# Patient Record
Sex: FEMALE | Race: BLACK OR AFRICAN AMERICAN | Employment: OTHER | URBAN - METROPOLITAN AREA
[De-identification: names, ages, dates, MRNs, and addresses within clinical notes are randomized per-mention and may not be internally consistent; named-entity substitution may affect disease eponyms.]

---

## 2017-01-03 ENCOUNTER — HOSPITAL ENCOUNTER (OUTPATIENT)
Dept: LAB | Age: 62
Discharge: HOME | End: 2017-01-03

## 2017-01-03 DIAGNOSIS — R11.0 NAUSEA: ICD-10-CM

## 2017-01-03 PROCEDURE — 99001 SPECIMEN HANDLING PT-LAB: CPT | Performed by: INTERNAL MEDICINE

## 2017-01-03 RX ORDER — PROMETHAZINE HYDROCHLORIDE 12.5 MG/1
TABLET ORAL
Qty: 90 TAB | Refills: 0 | Status: SHIPPED | OUTPATIENT
Start: 2017-01-03 | End: 2017-01-21 | Stop reason: SDUPTHER

## 2017-01-04 ENCOUNTER — OFFICE VISIT (OUTPATIENT)
Dept: FAMILY MEDICINE CLINIC | Age: 62
End: 2017-01-04

## 2017-01-04 VITALS
WEIGHT: 130 LBS | TEMPERATURE: 98.3 F | SYSTOLIC BLOOD PRESSURE: 126 MMHG | HEART RATE: 100 BPM | DIASTOLIC BLOOD PRESSURE: 73 MMHG | BODY MASS INDEX: 21.66 KG/M2 | RESPIRATION RATE: 16 BRPM | HEIGHT: 65 IN | OXYGEN SATURATION: 97 %

## 2017-01-04 DIAGNOSIS — R11.2 NON-INTRACTABLE VOMITING WITH NAUSEA, UNSPECIFIED VOMITING TYPE: ICD-10-CM

## 2017-01-04 DIAGNOSIS — K22.4 ESOPHAGEAL DYSFUNCTION: ICD-10-CM

## 2017-01-04 DIAGNOSIS — G47.00 INSOMNIA, UNSPECIFIED TYPE: ICD-10-CM

## 2017-01-04 DIAGNOSIS — I10 ESSENTIAL HYPERTENSION: Primary | ICD-10-CM

## 2017-01-04 DIAGNOSIS — F41.9 ANXIETY: ICD-10-CM

## 2017-01-04 RX ORDER — SOTALOL HYDROCHLORIDE 80 MG/1
40 TABLET ORAL 2 TIMES DAILY
Qty: 60 TAB | Refills: 5 | Status: SHIPPED | OUTPATIENT
Start: 2017-01-04 | End: 2017-01-30 | Stop reason: ALTCHOICE

## 2017-01-04 RX ORDER — MIDODRINE HYDROCHLORIDE 2.5 MG/1
2.5 TABLET ORAL 3 TIMES DAILY
Qty: 90 TAB | Refills: 5 | Status: SHIPPED | OUTPATIENT
Start: 2017-01-04 | End: 2017-01-30 | Stop reason: ALTCHOICE

## 2017-01-04 RX ORDER — LORAZEPAM 0.5 MG/1
TABLET ORAL
Qty: 120 TAB | Refills: 0 | Status: SHIPPED | OUTPATIENT
Start: 2017-01-04 | End: 2017-01-25 | Stop reason: SDUPTHER

## 2017-01-04 NOTE — PROGRESS NOTES
Chief Complaint   Patient presents with    Follow-up    Insomnia     Pt states she needs a stronger dose     Medication Refill     HISTORY OF PRESENT ILLNESS  Anat Haro is a 64 y.o. female. HPI  Pt here for f/u. She is still stuttering. She also has been having vomiting and chest pain. Pt saw Dr Duran Saldana (GI) yesterday. She will finally go for the esophageal manometry soon. Dr Duran Saldana sent a new rx for an anti-emetic but she was not able to get it because of cost.  Pt was told by the pharmacy to check back today. Pt stopped her eliquis and notes that the blood in her stool resolved. She has to do another stool sample for him. pt was told yesterday that her a-fib was \"off. \"    Pt states that she slept better with the lorazepam.  She feels that a higher dose could be better for sleep. She does get to sleep well but does not sleep She was calmer with the lorazepam when she took it during the day. She has been taking it TID. Review of Systems   Constitutional: Positive for malaise/fatigue. HENT: Negative. Cardiovascular: Positive for chest pain. Gastrointestinal: Positive for nausea and vomiting. Psychiatric/Behavioral: The patient is nervous/anxious and has insomnia. All other systems reviewed and are negative. Physical Exam   Constitutional: She is oriented to person, place, and time. She appears well-developed and well-nourished. HENT:   Head: Normocephalic and atraumatic. Right Ear: External ear normal.   Left Ear: External ear normal.   Nose: Nose normal.   Eyes: Conjunctivae and EOM are normal.   Neck: Normal range of motion. Neck supple. No JVD present. No thyromegaly present. Cardiovascular: Normal rate and normal heart sounds. An irregularly irregular rhythm present. Exam reveals no gallop and no friction rub. No murmur heard. Pulmonary/Chest: Effort normal and breath sounds normal. She has no wheezes. She has no rhonchi. She has no rales. Musculoskeletal: Normal range of motion. Neurological: She is alert and oriented to person, place, and time. Coordination normal.   Stuttering intermittently   Skin: Skin is warm and dry. Psychiatric: She has a normal mood and affect. Her behavior is normal. Judgment and thought content normal.   Nursing note and vitals reviewed. ASSESSMENT and PLAN  Rob Ochoa was seen today for follow-up, insomnia and medication refill. Diagnoses and all orders for this visit:    Essential hypertension  -     midodrine (PROAMITINE) 2.5 mg tablet; Take 1 Tab by mouth three (3) times daily. -     sotalol (BETAPACE) 80 mg tablet; Take 0.5 Tabs by mouth two (2) times a day. [facility]  Has cards appt in Feb; appt moved to Jan 13. Pt advised to seek emergent care for chest pain or other concerning chest sx. Anxiety  -     LORazepam (ATIVAN) 0.5 mg tablet; Take 1 pill by mouth at 7am if needed, take one pill by mouth at 3 pm if needed. Take 2 pills by mouth at 11pm each night. Insomnia, unspecified type  -     LORazepam (ATIVAN) 0.5 mg tablet; Take 1 pill by mouth at 7am if needed, take one pill by mouth at 3 pm if needed. Take 2 pills by mouth at 11pm each night.     Non-intractable vomiting with nausea, unspecified vomiting type  Gi eval in progress    Esophageal dysfunction  Gi eval in progress    Follow-up Disposition: 1 month; sooner prn

## 2017-01-04 NOTE — PROGRESS NOTES
Sarah Darby 64 y.o. female   Chief Complaint   Patient presents with    Follow-up    Insomnia     Pt states she needs a stronger dose     Medication Refill         1. Have you been to the ER, urgent care clinic since your last visit? Hospitalized since your last visit? No    2. Have you seen or consulted any other health care providers outside of the [address] [place] [address] since your last visit? Include any pap smears or colon screening.  No

## 2017-01-13 ENCOUNTER — OFFICE VISIT (OUTPATIENT)
Dept: CARDIOLOGY CLINIC | Age: 62
End: 2017-01-13

## 2017-01-13 VITALS
OXYGEN SATURATION: 93 % | HEART RATE: 72 BPM | SYSTOLIC BLOOD PRESSURE: 140 MMHG | WEIGHT: 127 LBS | HEIGHT: 65 IN | DIASTOLIC BLOOD PRESSURE: 90 MMHG | BODY MASS INDEX: 21.16 KG/M2

## 2017-01-13 DIAGNOSIS — K21.00 GASTROESOPHAGEAL REFLUX DISEASE WITH ESOPHAGITIS: ICD-10-CM

## 2017-01-13 DIAGNOSIS — F41.9 ANXIETY: ICD-10-CM

## 2017-01-13 DIAGNOSIS — I47.1 SUPRAVENTRICULAR TACHYCARDIA (HCC): Primary | ICD-10-CM

## 2017-01-13 DIAGNOSIS — R07.89 OTHER CHEST PAIN: ICD-10-CM

## 2017-01-13 NOTE — MR AVS SNAPSHOT
Visit Information Date & Time Provider Department Dept. Phone Encounter #  
 1/13/2017 12:40 PM Stephenie Piña MD [facility] [address] [id number] Your Appointments 2/6/2017 10:00 AM  
Follow Up with Lesli Salmeron MD  
[facility] (--) Appt Note: 1 month follow up Shonda Stephen [place] [address] [phone]  
  
    
 2/14/2017  2:20 PM  
Follow Up with Ej Veliz DO [facility] ([address]) Appt Note: ref by Dr. Arin Bradford ofc dx afib   notes in cc per Florliz Johnson Lora Zarate [address]  
[phone] Leodan Chang  
  
    
 2/16/2017 10:45 AM  
Office Visit with Levar Khan MD  
[facility] [address] [place] [address]) Appt Note: 2 mth f/u  
 [address] [facility] [address]  
[phone] [address]  
  
    
 3/22/2017 10:20 AM  
Follow Up with Luann Boone MD  
[facility] [address]) Appt Note: 3mon f/u  
 [address] [facility] [address] [facility] [address]  
[phone]  
  
   
 [place] [address] Upcoming Health Maintenance Date Due Hepatitis C Screening 1955 DTaP/Tdap/Td series (1 - Tdap) 1/29/1976 ZOSTER VACCINE AGE 60> 1/29/2015 INFLUENZA AGE 9 TO ADULT 8/1/2016 COLONOSCOPY 7/21/2017 PAP AKA CERVICAL CYTOLOGY 9/30/2017 BREAST CANCER SCRN MAMMOGRAM 11/2/2017 Allergies as of 1/13/2017  Review Complete On: 1/4/2017 By: Lesli Salmeron MD  
  
 Severity Noted Reaction Type Reactions Codeine  12/23/2013   Intolerance Nausea and Vomiting Pcn [Penicillins]    Not Reported This Time, Hives Tramadol  12/23/2013    Palpitations Vicodin [Hydrocodone-acetaminophen]  12/23/2013    Itching, Hives Current Immunizations  Reviewed on 6/25/2014 No immunizations on file. Not reviewed this visit You Were Diagnosed With   
  
 Codes Comments Atrial fibrillation, unspecified type ([facility]    -  Primary ICD-10-CM: I48.91 
ICD-9-CM: 427.31 Vitals BP Pulse Height(growth percentile) Weight(growth percentile) LMP SpO2  
 140/90 72 5' 5\" (1.651 m) 127 lb (57.6 kg) 12/30/1988 93% BMI OB Status Smoking Status 21.13 kg/m2 Hysterectomy Former Smoker Vitals History BMI and BSA Data Body Mass Index Body Surface Area  
 21.13 kg/m 2 1.63 m 2 Preferred Pharmacy Pharmacy Name Phone [facility], [address] Your Updated Medication List  
  
   
This list is accurate as of: 1/13/17  2:08 PM.  Always use your most recent med list.  
  
  
  
  
 apixaban 5 mg tablet Commonly known as:  Lauryn Falling Take 5 mg by mouth two (2) times a day. levothyroxine 75 mcg tablet Commonly known as:  SYNTHROID Take 1 Tab by mouth Daily (before breakfast). LORazepam 0.5 mg tablet Commonly known as:  ATIVAN Take 1 pill by mouth at 7am if needed, take one pill by mouth at 3 pm if needed. Take 2 pills by mouth at 11pm each night.  
  
 midodrine 2.5 mg tablet Commonly known as:  Maddie Vijay Take 1 Tab by mouth three (3) times daily. MULTIVITAMIN PO Take 1 tablet by mouth daily as needed. omeprazole 40 mg capsule Commonly known as:  PRILOSEC Take 1 Cap by mouth two (2) times a day. * oxyCODONE-acetaminophen  mg per tablet Commonly known as:  PERCOCET 10 Take 0.5-1 Tabs by mouth every six (6) hours as needed for Pain for up to 30 days. Max Daily Amount: 4 Tabs. * oxyCODONE-acetaminophen  mg per tablet Commonly known as:  PERCOCET 10  
 Take 0.5-1 Tabs by mouth every six (6) hours as needed for Pain for up to 30 days. Max Daily Amount: 4 Tabs. Start taking on:  1/20/2017  
  
 promethazine 12.5 mg tablet Commonly known as:  PHENERGAN  
take 1 tablet by mouth every 8 hours if needed for nausea  
  
 sotalol 80 mg tablet Commonly known as:  Lady Ortez Take 0.5 Tabs by mouth two (2) times a day. sucralfate 100 mg/mL suspension Commonly known as:  Charan Noe Take 1,000 mg by mouth Before breakfast, lunch, dinner and at bedtime. traZODone 50 mg tablet Commonly known as:  Jeremiah Lipps Take 1 Tab by mouth nightly as needed for Sleep. VITAMIN B-12 PO Take 1 tablet by mouth as needed. * Notice: This list has 2 medication(s) that are the same as other medications prescribed for you. Read the directions carefully, and ask your doctor or other care provider to review them with you. We Performed the Following AMB POC EKG ROUTINE W/ 12 LEADS, INTER & REP [93221 CPT(R)] Patient Instructions Decrease sotalol to 40 mg one tablet twice a day (1/2 tab of your 80 mg of sotalol twice a day) Stop eliquis Start Apirin 81 mg daily Introducing [facility]! [facility] introduces Punch! patient portal. Now you can access parts of your medical record, email your doctor's office, and request medication refills online. 1. In your internet browser, go to [email or web address] 2. Click on the First Time User? Click Here link in the Sign In box. You will see the New Member Sign Up page. 3. Enter your Punch! Access Code exactly as it appears below. You will not need to use this code after youve completed the sign-up process. If you do not sign up before the expiration date, you must request a new code. · Punch! Access Code: [email or web address] Expires: 2/26/2017  7:43 AM 
 
4.  Enter the last four digits of your Social Security Number (xxxx) and Date of Birth (mm/dd/yyyy) as indicated and click Submit. You will be taken to the next sign-up page. 5. Create a LeadCloud ID. This will be your LeadCloud login ID and cannot be changed, so think of one that is secure and easy to remember. 6. Create a LeadCloud password. You can change your password at any time. 7. Enter your Password Reset Question and Answer. This can be used at a later time if you forget your password. 8. Enter your e-mail address. You will receive e-mail notification when new information is available in [address] 9. Click Sign Up. You can now view and download portions of your medical record. 10. Click the Download Summary menu link to download a portable copy of your medical information. If you have questions, please visit the Frequently Asked Questions section of the LeadCloud website. Remember, LeadCloud is NOT to be used for urgent needs. For medical emergencies, dial 911. Now available from your iPhone and Android! Please provide this summary of care documentation to your next provider. Your primary care clinician is listed as Ericka Gonzalez. If you have any questions after today's visit, please call [phone].

## 2017-01-13 NOTE — PATIENT INSTRUCTIONS
Decrease sotalol to 40 mg one tablet twice a day (1/2 tab of your 80 mg of sotalol twice a day)  Stop eliquis   Start Apirin 81 mg daily

## 2017-01-21 DIAGNOSIS — R11.0 NAUSEA: ICD-10-CM

## 2017-01-23 RX ORDER — PROMETHAZINE HYDROCHLORIDE 12.5 MG/1
TABLET ORAL
Qty: 90 TAB | Refills: 0 | Status: SHIPPED | OUTPATIENT
Start: 2017-01-23 | End: 2017-02-20 | Stop reason: DRUGHIGH

## 2017-01-25 DIAGNOSIS — F41.9 ANXIETY: ICD-10-CM

## 2017-01-25 DIAGNOSIS — G47.00 INSOMNIA, UNSPECIFIED TYPE: ICD-10-CM

## 2017-01-25 RX ORDER — LORAZEPAM 0.5 MG/1
TABLET ORAL
Qty: 120 TAB | Refills: 0 | Status: SHIPPED | OUTPATIENT
Start: 2017-01-25 | End: 2017-02-20 | Stop reason: SDUPTHER

## 2017-01-26 ENCOUNTER — OFFICE VISIT (OUTPATIENT)
Dept: CARDIOLOGY CLINIC | Age: 62
End: 2017-01-26

## 2017-01-26 VITALS
DIASTOLIC BLOOD PRESSURE: 80 MMHG | WEIGHT: 131 LBS | HEART RATE: 89 BPM | HEIGHT: 65 IN | SYSTOLIC BLOOD PRESSURE: 130 MMHG | BODY MASS INDEX: 21.83 KG/M2

## 2017-01-26 DIAGNOSIS — K21.00 GASTROESOPHAGEAL REFLUX DISEASE WITH ESOPHAGITIS: ICD-10-CM

## 2017-01-26 DIAGNOSIS — R53.1 WEAKNESS GENERALIZED: ICD-10-CM

## 2017-01-26 DIAGNOSIS — I47.1 SUPRAVENTRICULAR TACHYCARDIA (HCC): Primary | ICD-10-CM

## 2017-01-26 DIAGNOSIS — I10 ESSENTIAL HYPERTENSION: ICD-10-CM

## 2017-01-26 DIAGNOSIS — F41.9 ANXIETY: ICD-10-CM

## 2017-01-26 RX ORDER — TIZANIDINE 4 MG/1
TABLET ORAL
Refills: 0 | COMMUNITY
Start: 2017-01-17 | End: 2017-02-17 | Stop reason: SDUPTHER

## 2017-01-26 RX ORDER — ASPIRIN 81 MG/1
TABLET ORAL DAILY
COMMUNITY
End: 2020-02-03 | Stop reason: ALTCHOICE

## 2017-01-26 RX ORDER — HYDROCHLOROTHIAZIDE 25 MG/1
25 TABLET ORAL DAILY
Refills: 0 | COMMUNITY
Start: 2017-01-10 | End: 2018-02-13 | Stop reason: SDUPTHER

## 2017-01-26 NOTE — MR AVS SNAPSHOT
Visit Information Date & Time Provider Department Dept. Phone Encounter #  
 1/26/2017 11:00 AM Crystal Diez MD [facility] [address] [id number] Your Appointments 2/6/2017 10:00 AM  
Follow Up with Maico Ly MD  
[address] [place] [address] (--) Appt Note: 1 month follow up Shonda Stephen [address] [address]  
  
    
 2/14/2017  2:20 PM  
Follow Up with Eliz Ross DO [facility]) Appt Note: ref by Dr. Alan Grimaldo ofc dx afib   notes in cc per Toya Johnson [address] [address] [address] [phone] Leodan Chang  
  
    
 2/16/2017 10:45 AM  
Office Visit with Janice Metz MD  
[address] [place] [address] for Pain Management [facility] Appt Note: 2 mth f/u  
 [address] [facility] [address]  
[phone] [address]  
  
    
 3/22/2017 10:20 AM  
Follow Up with Fred Cedillo MD  
[address] [place] [address] Appt Note: 3mon f/u  
 [address] [facility] [address] [facility] [address]  
[phone]  
  
   
 [facility] [address] Upcoming Health Maintenance Date Due Hepatitis C Screening 1955 DTaP/Tdap/Td series (1 - Tdap) 1/29/1976 ZOSTER VACCINE AGE 60> 1/29/2015 INFLUENZA AGE 9 TO ADULT 8/1/2016 COLONOSCOPY 7/21/2017 PAP AKA CERVICAL CYTOLOGY 9/30/2017 BREAST CANCER SCRN MAMMOGRAM 11/2/2017 Allergies as of 1/26/2017  Review Complete On: 1/26/2017 By: Zackary Cheng Severity Noted Reaction Type Reactions Codeine  12/23/2013   Intolerance Nausea and Vomiting Pcn [Penicillins]    Not Reported This Time, Hives Tramadol  12/23/2013    Palpitations Vicodin [Hydrocodone-acetaminophen]  12/23/2013    Itching, Hives Current Immunizations  Reviewed on 6/25/2014 No immunizations on file. Not reviewed this visit You Were Diagnosed With   
  
 Codes Comments Paroxysmal atrial fibrillation (HCC)    -  Primary ICD-10-CM: I48.0 ICD-9-CM: 427.31 Vitals BP Pulse Height(growth percentile) Weight(growth percentile) LMP BMI  
 130/80 89 5' 5\" (1.651 m) 131 lb (59.4 kg) 12/30/1988 21.8 kg/m2 OB Status Smoking Status Hysterectomy Former Smoker Vitals History BMI and BSA Data Body Mass Index Body Surface Area  
 21.8 kg/m 2 1.65 m 2 Preferred Pharmacy Pharmacy Name Phone [address] [facility], [address] Your Updated Medication List  
  
   
This list is accurate as of: 1/26/17 12:26 PM.  Always use your most recent med list.  
  
  
  
  
 aspirin delayed-release 81 mg tablet Take  by mouth daily. hydroCHLOROthiazide 25 mg tablet Commonly known as:  HYDRODIURIL Take 25 mg by mouth daily. levothyroxine 75 mcg tablet Commonly known as:  SYNTHROID Take 1 Tab by mouth Daily (before breakfast). LORazepam 0.5 mg tablet Commonly known as:  ATIVAN  
TAKE 1 TABLET BY MOUTH AT 7AM AS NEEDED. TAKE 1 TABLET AT 3PM AS NEEDED. TAKE 2 TABLETS AT 11PM EVERY NIGHT  
  
 midodrine 2.5 mg tablet Commonly known as:  Martha Pickle Take 1 Tab by mouth three (3) times daily. omeprazole 40 mg capsule Commonly known as:  PRILOSEC Take 1 Cap by mouth two (2) times a day. oxyCODONE-acetaminophen  mg per tablet Commonly known as:  PERCOCET 10 Take 0.5-1 Tabs by mouth every six (6) hours as needed for Pain for up to 30 days. Max Daily Amount: 4 Tabs. promethazine 12.5 mg tablet Commonly known as:  PHENERGAN  
take 1 tablet by mouth every 8 hours if needed for nausea  
  
 sotalol 80 mg tablet Commonly known as:  Edmond Fuentesring Take 0.5 Tabs by mouth two (2) times a day. tiZANidine 4 mg tablet Commonly known as:  ZANAFLEX  
  
 traZODone 50 mg tablet Commonly known as:  Barr Dwight Take 1 Tab by mouth nightly as needed for Sleep. VITAMIN B-12 PO Take 1 tablet by mouth as needed. We Performed the Following AMB POC EKG ROUTINE W/ 12 LEADS, INTER & REP [69597 CPT(R)] Introducing [facility]! Matthew Brigido introduces Spotigo patient portal. Now you can access parts of your medical record, email your doctor's office, and request medication refills online. 1. In your internet browser, go to [email or web address] 2. Click on the First Time User? Click Here link in the Sign In box. You will see the New Member Sign Up page. 3. Enter your Spotigo Access Code exactly as it appears below. You will not need to use this code after youve completed the sign-up process. If you do not sign up before the expiration date, you must request a new code. · Spotigo Access Code: [email or web address] Expires: 2/26/2017  7:43 AM 
 
4. Enter the last four digits of your Social Security Number (xxxx) and Date of Birth (mm/dd/yyyy) as indicated and click Submit. You will be taken to the next sign-up page. 5. Create a Spotigo ID. This will be your Spotigo login ID and cannot be changed, so think of one that is secure and easy to remember. 6. Create a Spotigo password. You can change your password at any time. 7. Enter your Password Reset Question and Answer. This can be used at a later time if you forget your password. 8. Enter your e-mail address. You will receive e-mail notification when new information is available in [address] 9. Click Sign Up. You can now view and download portions of your medical record. 10. Click the Download Summary menu link to download a portable copy of your medical information. If you have questions, please visit the Frequently Asked Questions section of the PacketFront[employer] website. Remember, [employer] is NOT to be used for urgent needs. For medical emergencies, dial 911. Now available from your iPhone and Android! Please provide this summary of care documentation to your next provider. Your primary care clinician is listed as Frances Rojas. If you have any questions after today's visit, please call [phone].

## 2017-01-26 NOTE — PROGRESS NOTES
1. Have you been to the ER, urgent care clinic since your last visit? Hospitalized since your last visit? NO  2. Have you seen or consulted any other health care providers outside of the [address] [place] [address] since your last visit? Include any pap smears or colon screening.   NO

## 2017-01-30 PROBLEM — F41.9 ANXIETY: Status: ACTIVE | Noted: 2017-01-30

## 2017-01-30 PROBLEM — K21.00 GASTROESOPHAGEAL REFLUX DISEASE WITH ESOPHAGITIS: Status: ACTIVE | Noted: 2017-01-30

## 2017-01-30 PROBLEM — I47.1 SUPRAVENTRICULAR TACHYCARDIA (HCC): Status: ACTIVE | Noted: 2017-01-30

## 2017-01-30 PROBLEM — R53.1 WEAKNESS GENERALIZED: Status: ACTIVE | Noted: 2017-01-30

## 2017-01-30 NOTE — PROGRESS NOTES
PATIENT NAME: Jamar Palomo         58 y.o.      1955              DATE:1/26/2017    REASON FOR VISIT: Supraventricular tachycardia    HISTORY OF PRESENT ILLNESS: This patient was previously diagnosed with atrial fibrillation. The rhythm was actually sinus tachycardia with premature atrial complexes and short paroxysms of supraventricular tachycardia. Her sotalol was decreased to 40 mg twice daily. Eliquis has been discontinued because of gastrointestinal bleeding. She denies palpitation. She does complain of periods of weakness. Denies syncope and presyncope. History of chest pain. Ischemic workup negative. Chest discomfort continues to be associated with sour eructation and a cough productive of sour tasting sputum. The discomfort is not exertional and is not accompanied by dyspnea or diaphoresis. The patient developed stuttering that was felt to be psychogenic.   This actually seems to be improving somewhat although it does persist.    PAST MEDICAL HISTORY:   Past Medical History:    Anxiety disorder                                              Arthritis                                                     Cervical dystonia                               9/5/2014      Cervical pain                                                 Cervicogenic headache                           9/5/2014      Chronic pain                                                    Comment:knee    DDD (degenerative disc disease), cervical       9/5/2014      Essential hypertension                                        Fibrillation, atrial (HCC)                                    Fibromyalgia                                                  GERD (gastroesophageal reflux disease)                        Headache(784.0)                                               Hepatic steatosis                                             Herniated disc, cervical                                      Hypercholesterolemia Hypertension                                                  Joint pain                                                    JRA (juvenile rheumatoid arthritis) ([facility])                     Muscle pain                                                   Musculoskeletal pain                            9/5/2014      Numbness and tingling of left arm and leg                     Status post cervical spinal fusion              9/5/2014      Thyroid disease                                               Vision decreased                                              PAST SURGICAL HISTORY:   Past Surgical History:    HX GASTRIC BYPASS                                02/03/10      HX OTHER SURGICAL                                                Comment:GIST tumor resected    HX CERVICAL LAMINECTOMY                          5/21/13         Comment:C3-4 ACDF     HX ORTHOPAEDIC                                   2013            Comment:Spinal SurgeryC-2 AND C-3    HX PARTIAL HYSTERECTOMY                          1988          HX LUMBAR LAMINECTOMY                                          HX OTHER SURGICAL                                                Comment:teeth extractions    MA EXCISION TUMOR SOFT TISS FACE/SCALP SUBQ < * N/A 09/30/2016      Comment:Dr. Fracisco Colin      SOCIAL HISTORY:  Social History    Marital status:              Spouse name:                       Years of education:                 Number of children:               Occupational History  Occupation          Employer            Comment               disabled            disability              Social History Main Topics    Smoking status: Former Smoker                                                                Packs/day: 0.00      Years: 0.00           Types: Cigarettes       Quit date: 12/31/1989    Smokeless status: Never Used                        Alcohol use: No              Drug use:  Yes                Special: Prescription, OTC      ALLERGIES:    -- Codeine -- Nausea and Vomiting   -- Pcn [Penicillins] -- Not Reported This Time and Hives   -- Tramadol -- Palpitations   -- Vicodin [Hydrocodone-Acetaminophen] -- Itching and Hives     CURRENT MEDICATIONS:   Current Outpatient Prescriptions:  hydroCHLOROthiazide (HYDRODIURIL) 25 mg tablet, Take 25 mg by mouth daily. tiZANidine (ZANAFLEX) 4 mg tablet,   aspirin delayed-release 81 mg tablet, Take  by mouth daily. LORazepam (ATIVAN) 0.5 mg tablet, TAKE 1 TABLET BY MOUTH AT 7AM AS NEEDED. TAKE 1 TABLET AT 3PM AS NEEDED. TAKE 2 TABLETS AT 11PM EVERY NIGHT  promethazine (PHENERGAN) 12.5 mg tablet, take 1 tablet by mouth every 8 hours if needed for nausea  midodrine (PROAMITINE) 2.5 mg tablet, Take 1 Tab by mouth three (3) times daily. sotalol (BETAPACE) 80 mg tablet, Take 0.5 Tabs by mouth two (2) times a day. oxyCODONE-acetaminophen (PERCOCET 10)  mg per tablet, Take 0.5-1 Tabs by mouth every six (6) hours as needed for Pain for up to 30 days. Max Daily Amount: 4 Tabs. traZODone (DESYREL) 50 mg tablet, Take 1 Tab by mouth nightly as needed for Sleep.  omeprazole (PRILOSEC) 40 mg capsule, Take 1 Cap by mouth two (2) times a day. levothyroxine (SYNTHROID) 75 mcg tablet, Take 1 Tab by mouth Daily (before breakfast). CYANOCOBALAMIN, VITAMIN B-12, (VITAMIN B-12 PO), Take 1 tablet by mouth as needed. No current facility-administered medications for this visit. REVIEW of SYSTEMS:History obtained from spouse, chart review and the patient  General ROS: negative for - weight gain or weight loss  Psychological ROS: positive for - anxiety  Respiratory ROS: See history of present illness  Cardiovascular ROS: See history of present illness  Gastrointestinal ROS: Continues to experience rather severe gastroesophageal reflux. There have been no signs of gastrointestinal bleeding.   Neurological ROS: no TIA or stroke symptoms     PHYSICAL EXAMINATION:   /80  Pulse 89 Ht 5' 5\" (1.651 m)  Wt 59.4 kg (131 lb)  LMP 12/30/1988  BMI 21.8 kg/m2  BP Readings from Last 3 Encounters:  01/26/17 : 130/80  01/13/17 : 140/90  01/04/17 : 126/73    Pulse Readings from Last 3 Encounters:  01/26/17 : 89  01/13/17 : 72  01/04/17 : 100    Wt Readings from Last 3 Encounters:  01/26/17 : 59.4 kg (131 lb)  01/13/17 : 57.6 kg (127 lb)  01/04/17 : 59 kg (130 lb)    General: Well-developed -American female no apparent distress. Neck: No jugular venous distention. Carotid upstrokes 2+ without bruits. Chest: Scattered rhonchi. No rales or wheezes. Heart: PMI not palpable. Regular rhythm. No murmur or gallop audible. Abdomen: Epigastric discomfort to palpation. No peritoneal signs. Extremities: No edema. Skin: Warm and dry. No stasis changes. Neurologic: The patient is stuttering although this has improved. Speech content is normal.  No facial asymmetry. EKG: Sinus rhythm. Atrial premature complexes, conducted and nonconducted. Nonspecific T changes    IMPRESSION:   Paroxysmal supraventricular tachycardia, asymptomatic  No documented history of atrial fibrillation  Chest pain, noncardiac. Gastroesophageal reflux. Generalized weakness  Anxiety    PLAN:  Discontinue sotalol  Discontinue midodrine  Return to office in 2 weeks    The diagnoses and plan were discussed with patient and spouse. All questions answered. Plan of care agreed to by all concerned. William Klein.  MD Flynn       ,

## 2017-01-30 NOTE — PROGRESS NOTES
PATIENT NAME: Wilmar Bejarano         58 y.o.      1955              DATE:1/13/2017    REASON FOR VISIT: Atrial fibrillation    HISTORY OF PRESENT ILLNESS: This patient was admitted to another hospital in November of this year after presenting with chest pain. She was diagnosed with atrial fibrillation on admission. I have reviewed that EKG. The rhythm was actually sinus tachycardia with atrial premature complexes and short runs of supraventricular tachycardia. She was discharged on sotalol 80 mg twice daily and Eliquis. The Eliquis has since been discontinued because of bleeding problems. The patient denies palpitation however she is extremely weak and experiences lightheaded episodes frequently. The chest pain in question is a high sternal heaviness/aching discomfort that is worse after eating and that is associated with sour eructation. It is also associated with a cough productive of sour tasting sputum. The patient had a pharmacologic stress test while in the hospital.  The myocardial scan was read as being entirely normal.  She also had an echocardiogram that showed normal left ventricular size and wall motion and normal ejection fraction. She is being evaluated by a gastroenterologist for the chest discomfort and the gastrointestinal bleeding. The patient is status post gastric bypass surgery. Upper endoscopy while in the hospital did not reveal any evidence for obstruction. Patient has developed rather severe stuttering that is felt to be psychogenic. She is extremely anxious.     PAST MEDICAL HISTORY:   Past Medical History:    Anxiety disorder                                              Arthritis                                                     Cervical dystonia                               9/5/2014      Cervical pain                                                 Cervicogenic headache                           9/5/2014      Chronic pain Comment:knee    DDD (degenerative disc disease), cervical       9/5/2014      Essential hypertension                                        Fibrillation, atrial (HCC)                                    Fibromyalgia                                                  GERD (gastroesophageal reflux disease)                        Headache(784.0)                                               Hepatic steatosis                                             Herniated disc, cervical                                      Hypercholesterolemia                                          Hypertension                                                  Joint pain                                                    JRA (juvenile rheumatoid arthritis) (HCC)                     Muscle pain                                                   Musculoskeletal pain                            9/5/2014      Numbness and tingling of left arm and leg                     Status post cervical spinal fusion              9/5/2014      Thyroid disease                                               Vision decreased                                              PAST SURGICAL HISTORY:   Past Surgical History:    HX GASTRIC BYPASS                                02/03/10      HX OTHER SURGICAL                                                Comment:GIST tumor resected    HX CERVICAL LAMINECTOMY                          5/21/13         Comment:C3-4 ACDF     HX ORTHOPAEDIC                                   2013            Comment:Spinal SurgeryC-2 AND C-3    HX PARTIAL HYSTERECTOMY                          1988          HX LUMBAR LAMINECTOMY                                          HX OTHER SURGICAL                                                Comment:teeth extractions    AK EXCISION TUMOR SOFT TISS FACE/SCALP SUBQ < * N/A 09/30/2016      Comment:Dr. Tracie Don      SOCIAL HISTORY:  Social History    Marital status:              Spouse name: Years of education:                 Number of children:               Occupational History  Occupation          Employer            Comment               disabled            disability              Social History Main Topics    Smoking status: Former Smoker                                                                Packs/day: 0.00      Years: 0.00           Types: Cigarettes       Quit date: 12/31/1989    Smokeless status: Never Used                        Alcohol use: No              Drug use: Yes                Special: Prescription, OTC      ALLERGIES:    -- Codeine -- Nausea and Vomiting   -- Pcn [Penicillins] -- Not Reported This Time and Hives   -- Tramadol -- Palpitations   -- Vicodin [Hydrocodone-Acetaminophen] -- Itching and Hives     CURRENT MEDICATIONS:   Current Outpatient Prescriptions:  midodrine (PROAMITINE) 2.5 mg tablet, Take 1 Tab by mouth three (3) times daily. sotalol (BETAPACE) 80 mg tablet, Take 0.5 Tabs by mouth two (2) times a day. oxyCODONE-acetaminophen (PERCOCET 10)  mg per tablet, Take 0.5-1 Tabs by mouth every six (6) hours as needed for Pain for up to 30 days. Max Daily Amount: 4 Tabs. traZODone (DESYREL) 50 mg tablet, Take 1 Tab by mouth nightly as needed for Sleep.  omeprazole (PRILOSEC) 40 mg capsule, Take 1 Cap by mouth two (2) times a day. levothyroxine (SYNTHROID) 75 mcg tablet, Take 1 Tab by mouth Daily (before breakfast). CYANOCOBALAMIN, VITAMIN B-12, (VITAMIN B-12 PO), Take 1 tablet by mouth as needed. hydroCHLOROthiazide (HYDRODIURIL) 25 mg tablet, Take 25 mg by mouth daily. tiZANidine (ZANAFLEX) 4 mg tablet,   aspirin delayed-release 81 mg tablet, Take  by mouth daily. LORazepam (ATIVAN) 0.5 mg tablet, TAKE 1 TABLET BY MOUTH AT 7AM AS NEEDED. TAKE 1 TABLET AT 3PM AS NEEDED.  TAKE 2 TABLETS AT 11PM EVERY NIGHT  promethazine (PHENERGAN) 12.5 mg tablet, take 1 tablet by mouth every 8 hours if needed for nausea    No current facility-administered medications for this visit. REVIEW of SYSTEMS:History obtained from spouse, chart review and the patient  General ROS: negative for - fever, weight gain or weight loss  Psychological ROS: positive for - anxiety and psychogenic stuttering  Respiratory ROS: Postprandial cough productive of sour tasting sputum. Cardiovascular ROS: Please see history of present illness. Denies exertional chest discomfort. She claims to be extremely lightheaded. Denies palpitation. She has not been syncopal.  She does apparently experience some shortness of breath on exertion. Denies orthopnea and paroxysmal nocturnal dyspnea. Gastrointestinal ROS: Gastroesophageal reflux per history of present illness. History of gastrointestinal bleeding being evaluated by gastroenterology. Neurological ROS: no TIA or stroke symptoms     PHYSICAL EXAMINATION:   /90  Pulse 72  Ht 5' 5\" (1.651 m)  Wt 57.6 kg (127 lb)  LMP 12/30/1988  SpO2 93%  BMI 21.13 kg/m2  BP Readings from Last 3 Encounters:  01/26/17 : 130/80  01/13/17 : 140/90  01/04/17 : 126/73    Pulse Readings from Last 3 Encounters:  01/26/17 : 89  01/13/17 : 72  01/04/17 : 100    Wt Readings from Last 3 Encounters:  01/26/17 : 59.4 kg (131 lb)  01/13/17 : 57.6 kg (127 lb)  01/04/17 : 59 kg (130 lb)    General: Very anxious appearing -American female . HEENT: Sclera clear. Mucous membranes pink and moist.  Neck: No jugular venous distention. Carotid upstrokes 2+ without bruits. Chest: Scattered rhonchi. No wheezes. Heart: PMI not palpable. Regular rhythm. No murmur or gallop. Abdomen: Nontender without masses or organomegaly. Extremities: No edema. Dorsalis pedis and posterior tibial pulses intact skin: Warm and dry. No stasis changes. Neuro: Alert, oriented, speech content is normal but the patient is stuttering severely. , no facial asymmetry. Gait WNL. EKG: Sinus rhythm.   Nonspecific T changes    IMPRESSION:   The history of atrial fibrillation was not documented. The EKG actually showed sinus tachycardia with premature atrial complexes and paroxysms of supraventricular tachycardia. Chest pain, noncardiac. Gastroesophageal reflux  Probable component of pulmonary aspiration  History of gastrointestinal bleeding    PLAN:  Stay off of Eliquis. The patient does not require anticoagulation  Decrease sotalol to 40 mg twice daily  Follow-up in 1-2 weeks. Consider discontinuing sotalol at that time    The diagnoses and plan were discussed with patient and spouse. All questions answered. Plan of care agreed to by all concerned. Oli Kemp.  MD Flynn       ,              .

## 2017-02-07 ENCOUNTER — HOSPITAL ENCOUNTER (OUTPATIENT)
Dept: GENERAL RADIOLOGY | Age: 62
Discharge: HOME | End: 2017-02-07
Attending: INTERNAL MEDICINE
Payer: MEDICARE

## 2017-02-07 DIAGNOSIS — R13.10 DYSPHAGIA, UNSPECIFIED TYPE: ICD-10-CM

## 2017-02-07 DIAGNOSIS — R13.10 DYSPHAGIA: ICD-10-CM

## 2017-02-07 PROCEDURE — G8996 SWALLOW CURRENT STATUS: HCPCS

## 2017-02-07 PROCEDURE — G8997 SWALLOW GOAL STATUS: HCPCS

## 2017-02-07 PROCEDURE — G8998 SWALLOW D/C STATUS: HCPCS

## 2017-02-07 PROCEDURE — 74230 X-RAY XM SWLNG FUNCJ C+: CPT

## 2017-02-07 PROCEDURE — 74011000255 HC RX REV CODE- 255: Performed by: INTERNAL MEDICINE

## 2017-02-07 PROCEDURE — 92611 MOTION FLUOROSCOPY/SWALLOW: CPT

## 2017-02-07 RX ADMIN — BARIUM SULFATE 45 ML: 400 PASTE ORAL at 14:00

## 2017-02-07 RX ADMIN — BARIUM SULFATE 700 MG: 700 TABLET ORAL at 14:00

## 2017-02-07 RX ADMIN — BARIUM SULFATE 60 G: 960 POWDER, FOR SUSPENSION ORAL at 14:00

## 2017-02-08 NOTE — PROGRESS NOTES
Outpatient Modified Barium Swallow Evaluation    Patient: Jamar Palomo (22 y.o. female)  Date: 2/7/2017  Primary Diagnosis: Dysphagia [R13.10]  Precautions: None on file        Videofluoroscopy Results  MBS completed with pt demonstrating intact oropharyngeal swallow mechanics. Extensive cervical hardware visualized; however, did not appear to impact swallow safety or pharyngeal clearance. Pt reporting food \"getting stuck\" localized to the level of the sternum and reports sometimes \"I have to make myself throw up to bring it up\". Pt tolerating thin liquid +/- straw, puree, regular solid and 13 mm Ba pill with thin liquid wash with positive airway protection noted across trials. Pt with positive bolus manipulation/control, timely swallow initiation, adequate laryngeal elevation/adduction, positive epiglottic inversion and positive pharyngeal clearance. Pt safe for regular diet with thin liquids but may benefit from soft solids for improved comfort. Recommend upright for intake and small bites/sips. D/w pt and  who verbalized understanding. Rec:  Soft solids, thin liquids  Small bites/sips  Upright for intake and for at least 30 min after po     Video Flouroscopic Procedures  [x] Lateral View   [] A-P View [] Scanned to level of Sternum    [x] Seated at 90 deg.   [] Other:    Presentation:    [x] Spoon   [x] Cup   [x] Straw   [] Syringe   [x] Consecutive Swallows  [] Other:    Consistencies:   [x] Ba+ liquid   [] Ba+ liquid (nectar)   [] Ba+ liquid (honey)   [x] Ba+ puree [x] Ba+ cookie [x] 13 mm Ba pill with thin Ba wash    Treatment Techniques Attempted   [] Head Turn: [] Right [] Left     [] Head Tilt: [] Right [] Left     [] Chin Down:  [x] Small Sips/bites:  [] Effortful swallow:  [] Double swallow:  [] Other:    Results  Dysphagia Present:     [] Yes  [x] No    Aspiration:   [] Yes    [x] No  [] At Risk     Cough: [] Yes      [] No     Penetration:   [] Yes    [x] No     Cough: [] Yes      [] No   [] Flash/trace   [] Mod   [] To Chords          Motility Problems with: N/A  [] Lip Closure:    [] Mastication:   [] Bolus Formation/control:   [] A-P Transport:  [] Tongue Base Retraction:  [] Swallow Response (delayed):  [] Velopharyngeal Closure:  [] Pharyngeal Aspirations:  [] Laryngeal Elevation/adduction:  [] Epiglottic Inversion:  [] Pharyngeal motility/sensation:  [] Cricopharyngeal Relaxation:  [] Esophageal Peristalsis:  [] Other:    Timing of Aspiration/Penetration: N/A  [] Before Swallow:  [] During Swallow:  [] After Swallow:    Transit Time Delay: N/A  [] >1 Second  Oral  [] >1 Second Pharyngeal  [] >20 Second Esophageal     Residuals: N/A  [] Vallecula:    [] Mild  [] Mod  [] Severe  [] Pyriform Sinus:   [] Mild  [] Mod  [] Severe  [] Posterior Pharyngeal Wall:  [] Mild  [] Mod  [] Severe    GCODES(GN):GCODESwallowing:  Swallow Current Status CH= 0%   Swallow Goal Status CH= 0%   Swallow D/C Status CH= 0%     Thank you for this referral,   Johnna Barthel, M.S., [address] [facility]  Speech-Language Pathologist

## 2017-02-16 ENCOUNTER — OFFICE VISIT (OUTPATIENT)
Dept: PAIN MANAGEMENT | Age: 62
End: 2017-02-16

## 2017-02-16 VITALS — RESPIRATION RATE: 19 BRPM | SYSTOLIC BLOOD PRESSURE: 140 MMHG | DIASTOLIC BLOOD PRESSURE: 97 MMHG | HEART RATE: 93 BPM

## 2017-02-16 DIAGNOSIS — Z79.899 ENCOUNTER FOR LONG-TERM (CURRENT) USE OF MEDICATIONS: ICD-10-CM

## 2017-02-16 DIAGNOSIS — G89.4 CHRONIC PAIN SYNDROME: ICD-10-CM

## 2017-02-16 DIAGNOSIS — M96.1 POSTLAMINECTOMY SYNDROME, CERVICAL REGION: ICD-10-CM

## 2017-02-16 DIAGNOSIS — Z98.1 STATUS POST CERVICAL SPINAL ARTHRODESIS: ICD-10-CM

## 2017-02-16 DIAGNOSIS — M54.2 NECK PAIN: Primary | ICD-10-CM

## 2017-02-16 DIAGNOSIS — M50.00 CERVICAL DISC DISEASE WITH MYELOPATHY: ICD-10-CM

## 2017-02-16 DIAGNOSIS — Z98.890 S/P CERVICAL DISCECTOMY: ICD-10-CM

## 2017-02-16 DIAGNOSIS — F41.9 ANXIETY: ICD-10-CM

## 2017-02-16 DIAGNOSIS — M50.30 DDD (DEGENERATIVE DISC DISEASE), CERVICAL: ICD-10-CM

## 2017-02-16 DIAGNOSIS — Z98.1 STATUS POST CERVICAL SPINAL FUSION: ICD-10-CM

## 2017-02-16 LAB
ALCOHOL UR POC: NORMAL
AMPHETAMINES UR POC: NEGATIVE
BARBITURATES UR POC: NEGATIVE
BENZODIAZEPINES UR POC: NEGATIVE
BUPRENORPHINE UR POC: NORMAL
CANNABINOIDS UR POC: NEGATIVE
CARISOPRODOL UR POC: NORMAL
COCAINE UR POC: NEGATIVE
FENTANYL UR POC: NORMAL
MDMA/ECSTASY UR POC: NEGATIVE
METHADONE UR POC: NEGATIVE
METHAMPHETAMINE UR POC: NEGATIVE
METHYLPHENIDATE UR POC: NEGATIVE
OPIATES UR POC: NEGATIVE
OXYCODONE UR POC: NEGATIVE
PHENCYCLIDINE UR POC: NEGATIVE
PROPOXYPHENE UR POC: NORMAL
TRAMADOL UR POC: NORMAL
TRICYCLICS UR POC: NEGATIVE

## 2017-02-16 RX ORDER — TIZANIDINE 4 MG/1
4 TABLET ORAL
Qty: 120 TAB | Refills: 2 | Status: SHIPPED | OUTPATIENT
Start: 2017-02-16 | End: 2017-04-11 | Stop reason: SDUPTHER

## 2017-02-16 RX ORDER — OXYCODONE AND ACETAMINOPHEN 10; 325 MG/1; MG/1
.5-1 TABLET ORAL
Qty: 120 TAB | Refills: 0 | Status: SHIPPED | OUTPATIENT
Start: 2017-02-16 | End: 2017-04-11 | Stop reason: SDUPTHER

## 2017-02-16 RX ORDER — OXYCODONE AND ACETAMINOPHEN 10; 325 MG/1; MG/1
.5-1 TABLET ORAL
Qty: 120 TAB | Refills: 0 | Status: SHIPPED | OUTPATIENT
Start: 2017-03-15 | End: 2017-04-11 | Stop reason: SDUPTHER

## 2017-02-16 NOTE — PROGRESS NOTES
HISTORY OF PRESENT ILLNESS  Nirav Flores is a 58 y.o. female. HPI Comments: Meds help with pain control and quality of life. No new side effects reported today. No new medical problems reported today. Visit survey reviewed, and will be scanned. Recent Average pain level (out of 10). --  8  Chief complaint neck pain  Chronic pain  She is using tizanidine 4 mg every 6 hours as needed  Percocet 10 mg up to 4 day as needed  Medication helps improve general activity, walking  Nursing reports, pill count was short today. Patient has informed me that she forgot to bring in some remaining tablets. She left them at home in a different container. She denies that she has been self increasing. She was given a verbal reminder today, to make sure she brings in all the medication. Review of Systems   Constitutional: Negative for chills and fever. HENT: Negative for ear discharge and ear pain. Eyes: Negative for pain and discharge. Respiratory: Negative for hemoptysis and wheezing. Gastrointestinal: Negative for blood in stool and melena. Genitourinary: Negative for dysuria and flank pain. Musculoskeletal: Positive for back pain and neck pain. Skin: Negative for itching and rash. Neurological: Negative for seizures and loss of consciousness. Psychiatric/Behavioral: Negative for hallucinations, substance abuse and suicidal ideas. Physical Exam   Constitutional: She appears well-developed and well-nourished. She is cooperative. She does not have a sickly appearance. HENT:   Head: Normocephalic and atraumatic. Right Ear: External ear normal. No drainage. Left Ear: External ear normal. No drainage. Nose: Nose normal.   Eyes: Lids are normal. Right eye exhibits no discharge. Left eye exhibits no discharge. Right conjunctiva has no hemorrhage. Left conjunctiva has no hemorrhage. Neck: Neck supple. No tracheal deviation present. No thyroid mass present.    Pulmonary/Chest: Effort normal. No respiratory distress. Neurological: She is alert. No cranial nerve deficit. Skin: Skin is intact. No rash noted. Psychiatric: Her speech is normal. Her affect is not angry. She does not express inappropriate judgment. Nursing note and vitals reviewed. ASSESSMENT and PLAN  Encounter Diagnoses   Name Primary?  Neck pain Yes    Encounter for long-term (current) use of medications     Postlaminectomy syndrome, cervical region     Anxiety     DDD (degenerative disc disease), cervical     Status post cervical spinal fusion     Chronic pain syndrome     Cervical disc disease with myelopathy     S/P cervical discectomy     Status post cervical spinal arthrodesis    No signs of addiction or diversion. The primary Dxes. ,including pain are controlled. Pain/Pain control/Meds and Quality Of Life have been reviewed. Nonpharmacologic therapy and non-opioid pharmacologic therapy are always considered. If opioid therapy is prescribed, this is only if the expected benefits for both pain and function are anticipated to outweigh risks. Possible changes to treatment plan considered. Support/education given as needed. Today-medications are as listed. No significant changes to medications. Follow up -- 2 months.  --Urine test or oral swab today. Also, the prescription monitoring program was reviewed today. The majority of today's visit was spent counseling and coordinating care. Total visit time-40 minutes. -Dragon medical dictation software was used for portions of this report. Unintended errors may occur.

## 2017-02-16 NOTE — MR AVS SNAPSHOT
Visit Information Date & Time Provider Department Dept. Phone Encounter #  
 2/16/2017 10:45 AM Freddie Lynn MD [facility] for Pain Management  Follow-up Instructions Return in about 2 months (around 4/16/2017). Follow-up and Disposition History Your Appointments 2/17/2017  2:00 PM  
Follow Up with Susan Stanley MD  
[facility] ([address]) Appt Note: 3 wk f/up [place] [id number]  
[phone] [place] [phone]  
  
    
 2/20/2017 11:15 AM  
Follow Up with Shine Bills MD  
[facility] (--) Appt Note: fu; fu; fu  
 [place] [address]  
  
    
 3/22/2017 10:20 AM  
Follow Up with Nitin Golden MD  
[facility] [address]) Appt Note: 3mon f/u  
 [address] [address]  
[phone]  
  
   
 [facility] [id number] Upcoming Health Maintenance Date Due Hepatitis C Screening 1955 DTaP/Tdap/Td series (1 - Tdap) 1/29/1976 ZOSTER VACCINE AGE 60> 1/29/2015 INFLUENZA AGE 9 TO ADULT 8/1/2016 COLONOSCOPY 7/21/2017 PAP AKA CERVICAL CYTOLOGY 9/30/2017 BREAST CANCER SCRN MAMMOGRAM 11/2/2017 Allergies as of 2/16/2017  Review Complete On: 2/16/2017 By: Freddie Lynn MD  
  
 Severity Noted Reaction Type Reactions Codeine  12/23/2013   Intolerance Nausea and Vomiting Pcn [Penicillins]    Not Reported This Time, Hives Tramadol  12/23/2013    Palpitations Vicodin [Hydrocodone-acetaminophen]  12/23/2013    Itching, Hives Current Immunizations  Reviewed on 6/25/2014 No immunizations on file. Not reviewed this visit You Were Diagnosed With   
  
 Codes Comments Neck pain    -  Primary ICD-10-CM: M54.2 ICD-9-CM: 723.1 Encounter for long-term (current) use of medications     ICD-10-CM: Z79.899 ICD-9-CM: V58.69 Postlaminectomy syndrome, cervical region     ICD-10-CM: M96.1 ICD-9-CM: 722.81 Anxiety     ICD-10-CM: F41.9 ICD-9-CM: 300.00   
 DDD (degenerative disc disease), cervical     ICD-10-CM: M50.30 ICD-9-CM: 722.4 Status post cervical spinal fusion     ICD-10-CM: Z98.1 ICD-9-CM: V45.4 Chronic pain syndrome     ICD-10-CM: G89.4 ICD-9-CM: 338.4 Cervical disc disease with myelopathy     ICD-10-CM: M50.00 ICD-9-CM: 722.71 S/P cervical discectomy     ICD-10-CM: F07.098 ICD-9-CM: V45.89 Status post cervical spinal arthrodesis     ICD-10-CM: Z98.1 ICD-9-CM: V45.4 Vitals BP Pulse Resp LMP OB Status Smoking Status (!) 140/97 93 19 12/30/1988 Hysterectomy Former Smoker Vitals History Preferred Pharmacy Pharmacy Name Phone [facility], [address] Your Updated Medication List  
  
   
This list is accurate as of: 2/16/17 11:08 AM.  Always use your most recent med list.  
  
  
  
  
 aspirin delayed-release 81 mg tablet Take  by mouth daily. hydroCHLOROthiazide 25 mg tablet Commonly known as:  HYDRODIURIL Take 25 mg by mouth daily. levothyroxine 75 mcg tablet Commonly known as:  SYNTHROID Take 1 Tab by mouth Daily (before breakfast). LORazepam 0.5 mg tablet Commonly known as:  ATIVAN  
TAKE 1 TABLET BY MOUTH AT 7AM AS NEEDED. TAKE 1 TABLET AT 3PM AS NEEDED. TAKE 2 TABLETS AT 11PM EVERY NIGHT  
  
 omeprazole 40 mg capsule Commonly known as:  PRILOSEC Take 1 Cap by mouth two (2) times a day. * oxyCODONE-acetaminophen  mg per tablet Commonly known as:  PERCOCET 10 Take 0.5-1 Tabs by mouth every six (6) hours as needed for Pain for up to 30 days. Max Daily Amount: 4 Tabs. * oxyCODONE-acetaminophen  mg per tablet Commonly known as:  PERCOCET 10 Take 0.5-1 Tabs by mouth every six (6) hours as needed for Pain for up to 30 days. Max Daily Amount: 4 Tabs. Start taking on:  3/15/2017  
  
 promethazine 12.5 mg tablet Commonly known as:  PHENERGAN  
take 1 tablet by mouth every 8 hours if needed for nausea * tiZANidine 4 mg tablet Commonly known as:  Neda Manual * tiZANidine 4 mg tablet Commonly known as:  Neda Manual Take 1 Tab by mouth every six (6) hours as needed for Pain (spasm) for up to 30 days. traZODone 50 mg tablet Commonly known as:  Cornelious Haymaker Take 1 Tab by mouth nightly as needed for Sleep. VITAMIN B-12 PO Take 1 tablet by mouth as needed. * Notice: This list has 4 medication(s) that are the same as other medications prescribed for you. Read the directions carefully, and ask your doctor or other care provider to review them with you. Prescriptions Printed Refills  
 oxyCODONE-acetaminophen (PERCOCET 10)  mg per tablet 0 Sig: Take 0.5-1 Tabs by mouth every six (6) hours as needed for Pain for up to 30 days. Max Daily Amount: 4 Tabs. Class: Print Route: Oral  
 oxyCODONE-acetaminophen (PERCOCET 10)  mg per tablet 0 Starting on: 3/15/2017 Sig: Take 0.5-1 Tabs by mouth every six (6) hours as needed for Pain for up to 30 days. Max Daily Amount: 4 Tabs. Class: Print Route: Oral  
  
Prescriptions Sent to Pharmacy Refills  
 tiZANidine (ZANAFLEX) 4 mg tablet 2 Sig: Take 1 Tab by mouth every six (6) hours as needed for Pain (spasm) for up to 30 days. Class: Normal  
 Pharmacy: [id number], [address] [facility] Ph #: [phone] Route: Oral  
  
We Performed the Following AMB POC DRUG SCREEN () [ [facility]] DRUG SCREEN [[id number] Custom] Follow-up Instructions Return in about 2 months (around 4/16/2017). Introducing [facility]! Zoe Lucas introduces [employer] patient portal. Now you can access parts of your medical record, email your doctor's office, and request medication refills online. 1. In your internet browser, go to [email or web address] 2. Click on the First Time User? Click Here link in the Sign In box. You will see the New Member Sign Up page. 3. Enter your [employer] Access Code exactly as it appears below. You will not need to use this code after youve completed the sign-up process. If you do not sign up before the expiration date, you must request a new code. · [employer] Access Code: [email or web address] Expires: 2/26/2017  7:43 AM 
 
4. Enter the last four digits of your Social Security Number (xxxx) and Date of Birth (mm/dd/yyyy) as indicated and click Submit. You will be taken to the next sign-up page. 5. Create a [employer] ID. This will be your [employer] login ID and cannot be changed, so think of one that is secure and easy to remember. 6. Create a [employer] password. You can change your password at any time. 7. Enter your Password Reset Question and Answer. This can be used at a later time if you forget your password. 8. Enter your e-mail address. You will receive e-mail notification when new information is available in [address] 9. Click Sign Up. You can now view and download portions of your medical record. 10. Click the Download Summary menu link to download a portable copy of your medical information. If you have questions, please visit the Frequently Asked Questions section of the [employer] website. Remember, [employer] is NOT to be used for urgent needs. For medical emergencies, dial 911. Now available from your iPhone and Android! Please provide this summary of care documentation to your next provider. Your primary care clinician is listed as Frances Rjoas. If you have any questions after today's visit, please call [phone].

## 2017-02-17 ENCOUNTER — OFFICE VISIT (OUTPATIENT)
Dept: CARDIOLOGY CLINIC | Age: 62
End: 2017-02-17

## 2017-02-17 VITALS
SYSTOLIC BLOOD PRESSURE: 110 MMHG | BODY MASS INDEX: 21.83 KG/M2 | DIASTOLIC BLOOD PRESSURE: 60 MMHG | WEIGHT: 131 LBS | HEART RATE: 86 BPM | OXYGEN SATURATION: 96 % | HEIGHT: 65 IN

## 2017-02-17 DIAGNOSIS — I47.1 PAROXYSMAL SVT (SUPRAVENTRICULAR TACHYCARDIA) (HCC): Primary | ICD-10-CM

## 2017-02-17 NOTE — MR AVS SNAPSHOT
Visit Information Date & Time Provider Department Dept. Phone Encounter #  
 2/17/2017  2:00 PM Merlinda Forget, MD [facility] [id number] Your Appointments 2/20/2017 11:15 AM  
Follow Up with New Weiner MD  
[facility] (--) Appt Note: fu; fu; fu  
 [address] [facility] [id number] [phone]  
  
   
 [facility] [address]  
  
    
 2/21/2017  2:00 PM  
Nurse Visit with [facility] NURSE VISIT [facility] ([address]) [address] [facility] [address] [facility] [address] [place] [address]  
  
    
 3/22/2017 10:20 AM  
Follow Up with Kacie Aquino MD  
[facility] [address]) Appt Note: 3mon f/u  
 [address] [address] [phone]  
  
   
 [facility] [address] 4/11/2017 10:45 AM  
Follow Up with Beatriz Chavis MD  
[facility] [address]) Appt Note: return in 2 months [address] [facility] [address]  
[phone] [facility] [id number] Upcoming Health Maintenance Date Due Hepatitis C Screening 1955 DTaP/Tdap/Td series (1 - Tdap) 1/29/1976 ZOSTER VACCINE AGE 60> 1/29/2015 INFLUENZA AGE 9 TO ADULT 8/1/2016 COLONOSCOPY 7/21/2017 PAP AKA CERVICAL CYTOLOGY 9/30/2017 BREAST CANCER SCRN MAMMOGRAM 11/2/2017 Allergies as of 2/17/2017  Review Complete On: 2/17/2017 By: Bonifacio Allison Severity Noted Reaction Type Reactions Codeine  12/23/2013   Intolerance Nausea and Vomiting Pcn [Penicillins]    Not Reported This Time, Hives Tramadol  12/23/2013    Palpitations Vicodin [Hydrocodone-acetaminophen]  12/23/2013    Itching, Hives Current Immunizations  Reviewed on 6/25/2014 No immunizations on file. Not reviewed this visit You Were Diagnosed With   
  
 Codes Comments Paroxysmal SVT (supraventricular tachycardia) (HCC)    -  Primary ICD-10-CM: I47.1 ICD-9-CM: 427.0 Vitals BP Pulse Height(growth percentile) Weight(growth percentile) LMP SpO2  
 110/60 86 5' 5\" (1.651 m) 131 lb (59.4 kg) 12/30/1988 96% BMI OB Status Smoking Status 21.8 kg/m2 Hysterectomy Former Smoker Vitals History BMI and BSA Data Body Mass Index Body Surface Area  
 21.8 kg/m 2 1.65 m 2 Preferred Pharmacy Pharmacy Name Phone [address] [facility], [address] [address] Your Updated Medication List  
  
   
This list is accurate as of: 2/17/17  2:53 PM.  Always use your most recent med list.  
  
  
  
  
 aspirin delayed-release 81 mg tablet Take  by mouth daily. hydroCHLOROthiazide 25 mg tablet Commonly known as:  HYDRODIURIL Take 25 mg by mouth daily. levothyroxine 75 mcg tablet Commonly known as:  SYNTHROID Take 1 Tab by mouth Daily (before breakfast). LORazepam 0.5 mg tablet Commonly known as:  ATIVAN  
TAKE 1 TABLET BY MOUTH AT 7AM AS NEEDED. TAKE 1 TABLET AT 3PM AS NEEDED. TAKE 2 TABLETS AT 11PM EVERY NIGHT  
  
 omeprazole 40 mg capsule Commonly known as:  PRILOSEC Take 1 Cap by mouth two (2) times a day. * oxyCODONE-acetaminophen  mg per tablet Commonly known as:  PERCOCET 10 Take 0.5-1 Tabs by mouth every six (6) hours as needed for Pain for up to 30 days. Max Daily Amount: 4 Tabs. * oxyCODONE-acetaminophen  mg per tablet Commonly known as:  PERCOCET 10 Take 0.5-1 Tabs by mouth every six (6) hours as needed for Pain for up to 30 days. Max Daily Amount: 4 Tabs. Start taking on:  3/15/2017  
  
 promethazine 12.5 mg tablet Commonly known as:  PHENERGAN  
 take 1 tablet by mouth every 8 hours if needed for nausea  
  
 tiZANidine 4 mg tablet Commonly known as:  Elenore Piggs Take 1 Tab by mouth every six (6) hours as needed for Pain (spasm) for up to 30 days. traZODone 50 mg tablet Commonly known as:  Lemmie Joceline Take 1 Tab by mouth nightly as needed for Sleep. VITAMIN B-12 PO Take 1 tablet by mouth as needed. * Notice: This list has 2 medication(s) that are the same as other medications prescribed for you. Read the directions carefully, and ask your doctor or other care provider to review them with you. We Performed the Following AMB POC EKG ROUTINE W/ 12 LEADS, INTER & REP [36274 CPT(R)] Introducing [facility] SERVICES! Roxana Burton introduces Yapmo patient portal. Now you can access parts of your medical record, email your doctor's office, and request medication refills online. 1. In your internet browser, go to [email or web address] Track the [email or web address] 2. Click on the First Time User? Click Here link in the Sign In box. You will see the New Member Sign Up page. 3. Enter your Yapmo Access Code exactly as it appears below. You will not need to use this code after youve completed the sign-up process. If you do not sign up before the expiration date, you must request a new code. · Yapmo Access Code: [email or web address] Expires: 2/26/2017  7:43 AM 
 
4. Enter the last four digits of your Social Security Number (xxxx) and Date of Birth (mm/dd/yyyy) as indicated and click Submit. You will be taken to the next sign-up page. 5. Create a Overture Networkst ID. This will be your Yapmo login ID and cannot be changed, so think of one that is secure and easy to remember. 6. Create a Overture Networkst password. You can change your password at any time. 7. Enter your Password Reset Question and Answer. This can be used at a later time if you forget your password. 8. Enter your e-mail address.  You will receive e-mail notification when new information is available in Knowthena. 9. Click Sign Up. You can now view and download portions of your medical record. 10. Click the Download Summary menu link to download a portable copy of your medical information. If you have questions, please visit the Frequently Asked Questions section of the Knowthena website. Remember, Knowthena is NOT to be used for urgent needs. For medical emergencies, dial 911. Now available from your iPhone and Android! Please provide this summary of care documentation to your next provider. Your primary care clinician is listed as Christoph Medel. If you have any questions after today's visit, please call [phone].

## 2017-02-17 NOTE — PROGRESS NOTES
1. Have you been to the ER, urgent care clinic since your last visit? Hospitalized since your last visit? no  2. Have you seen or consulted any other health care providers outside of the [address] [place] [address] since your last visit? Include any pap smears or colon screening.  no

## 2017-02-19 NOTE — PROGRESS NOTES
PATIENT NAME: Luli Johnson         58 y.o.      1955              DATE:2/17/2017    REASON FOR VISIT: Supraventricular tachycardia    HISTORY OF PRESENT ILLNESS: The patient had been given the diagnosis of atrial fibrillation. Review of the tracings revealed sinus rhythm with frequent atrial premature complexes and short runs of supraventricular tachycardia. I discontinued her sotalol on her last visit. Midodrine has also been discontinued. She denies palpitation, syncope, presyncope. Denies lightheadedness. Denies shortness of breath. Continues to experience chest pain occurring on attempting to swallow food. Gastroenterology working this up.     PAST MEDICAL HISTORY:   Past Medical History:    Anxiety disorder                                              Arthritis                                                     Cervical dystonia                               9/5/2014      Cervical pain                                                 Cervicogenic headache                           9/5/2014      Chronic pain                                                    Comment:knee    DDD (degenerative disc disease), cervical       9/5/2014      Essential hypertension                                        Fibrillation, atrial (HCC)                                    Fibromyalgia                                                  GERD (gastroesophageal reflux disease)                        Headache(784.0)                                               Hepatic steatosis                                             Herniated disc, cervical                                      Hypercholesterolemia                                          Hypertension                                                  Joint pain                                                    JRA (juvenile rheumatoid arthritis) (HCC)                     Muscle pain                                                   Musculoskeletal pain 9/5/2014      Numbness and tingling of left arm and leg                     Status post cervical spinal fusion              9/5/2014      Thyroid disease                                               Vision decreased                                              PAST SURGICAL HISTORY:   Past Surgical History:    HX GASTRIC BYPASS                                02/03/10      HX OTHER SURGICAL                                                Comment:GIST tumor resected    HX CERVICAL LAMINECTOMY                          5/21/13         Comment:C3-4 ACDF     HX ORTHOPAEDIC                                   2013            Comment:Spinal SurgeryC-2 AND C-3    HX PARTIAL HYSTERECTOMY                          1988          HX LUMBAR LAMINECTOMY                                          HX OTHER SURGICAL                                                Comment:teeth extractions    UT EXCISION TUMOR SOFT TISS FACE/SCALP SUBQ < * N/A 09/30/2016      Comment:Dr. Leah Lane      SOCIAL HISTORY:  Social History    Marital status:              Spouse name:                       Years of education:                 Number of children:               Occupational History  Occupation          Employer            Comment               disabled            disability              Social History Main Topics    Smoking status: Former Smoker                                                                Packs/day: 0.00      Years: 0.00           Types: Cigarettes       Quit date: 12/31/1989    Smokeless status: Never Used                        Alcohol use: No              Drug use:  Yes                Special: Prescription, OTC      ALLERGIES:    -- Codeine -- Nausea and Vomiting   -- Pcn [Penicillins] -- Not Reported This Time and Hives   -- Tramadol -- Palpitations   -- Vicodin [Hydrocodone-Acetaminophen] -- Itching and Hives     CURRENT MEDICATIONS:   Current Outpatient Prescriptions:  oxyCODONE-acetaminophen (PERCOCET 10)  mg per tablet, Take 0.5-1 Tabs by mouth every six (6) hours as needed for Pain for up to 30 days. Max Daily Amount: 4 Tabs. [START ON 3/15/2017] oxyCODONE-acetaminophen (PERCOCET 10)  mg per tablet, Take 0.5-1 Tabs by mouth every six (6) hours as needed for Pain for up to 30 days. Max Daily Amount: 4 Tabs. tiZANidine (ZANAFLEX) 4 mg tablet, Take 1 Tab by mouth every six (6) hours as needed for Pain (spasm) for up to 30 days. hydroCHLOROthiazide (HYDRODIURIL) 25 mg tablet, Take 25 mg by mouth daily. aspirin delayed-release 81 mg tablet, Take  by mouth daily. LORazepam (ATIVAN) 0.5 mg tablet, TAKE 1 TABLET BY MOUTH AT 7AM AS NEEDED. TAKE 1 TABLET AT 3PM AS NEEDED. TAKE 2 TABLETS AT 11PM EVERY NIGHT  promethazine (PHENERGAN) 12.5 mg tablet, take 1 tablet by mouth every 8 hours if needed for nausea  traZODone (DESYREL) 50 mg tablet, Take 1 Tab by mouth nightly as needed for Sleep.  omeprazole (PRILOSEC) 40 mg capsule, Take 1 Cap by mouth two (2) times a day. levothyroxine (SYNTHROID) 75 mcg tablet, Take 1 Tab by mouth Daily (before breakfast). CYANOCOBALAMIN, VITAMIN B-12, (VITAMIN B-12 PO), Take 1 tablet by mouth as needed. No current facility-administered medications for this visit. REVIEW of SYSTEMS:History obtained from the patient  Respiratory ROS:  see history of present illness  Cardiovascular ROS: Please see history of present illness     PHYSICAL EXAMINATION:   /60  Pulse 86  Ht 5' 5\" (1.651 m)  Wt 59.4 kg (131 lb)  LMP 12/30/1988  SpO2 96%  BMI 21.8 kg/m2  BP Readings from Last 3 Encounters:  02/17/17 : 110/60  02/16/17 : (!) 140/97  01/26/17 : 130/80    Pulse Readings from Last 3 Encounters:  02/17/17 : 86  02/16/17 : 93  01/26/17 : 89    Wt Readings from Last 3 Encounters:  02/17/17 : 59.4 kg (131 lb)  01/26/17 : 59.4 kg (131 lb)  01/13/17 : 57.6 kg (127 lb)    Neck: No jugular venous distention. Chest: Clear to auscultation. Heart: PMI not palpable. Regular rhythm. No gallop audible. Extremities: No edema      IMPRESSION:   Paroxysmal supraventricular tachycardia, asymptomatic  History of hypotension, no recurrence off of midodrine    PLAN:  Follow-up with gastroenterology. Return to this office if she experiences any symptoms referable to the cardiovascular system. The diagnoses and plan were discussed with patient and spouse. All questions answered. Plan of care agreed to by all concerned. Marisol Palmer.  MD Flynn       ,

## 2017-02-20 ENCOUNTER — OFFICE VISIT (OUTPATIENT)
Dept: FAMILY MEDICINE CLINIC | Age: 62
End: 2017-02-20

## 2017-02-20 VITALS
HEIGHT: 65 IN | DIASTOLIC BLOOD PRESSURE: 73 MMHG | RESPIRATION RATE: 20 BRPM | WEIGHT: 133 LBS | TEMPERATURE: 97.9 F | SYSTOLIC BLOOD PRESSURE: 138 MMHG | HEART RATE: 85 BPM | OXYGEN SATURATION: 100 % | BODY MASS INDEX: 22.16 KG/M2

## 2017-02-20 DIAGNOSIS — G47.00 INSOMNIA, UNSPECIFIED TYPE: ICD-10-CM

## 2017-02-20 DIAGNOSIS — I10 ESSENTIAL HYPERTENSION: Primary | ICD-10-CM

## 2017-02-20 DIAGNOSIS — F41.9 ANXIETY: ICD-10-CM

## 2017-02-20 RX ORDER — PROMETHAZINE HYDROCHLORIDE 25 MG/1
25 TABLET ORAL
COMMUNITY
End: 2017-05-22 | Stop reason: SDUPTHER

## 2017-02-20 RX ORDER — LORAZEPAM 0.5 MG/1
TABLET ORAL
Qty: 60 TAB | Refills: 0 | Status: SHIPPED | OUTPATIENT
Start: 2017-02-20 | End: 2017-04-26 | Stop reason: SDUPTHER

## 2017-02-20 NOTE — PROGRESS NOTES
Chief Complaint   Patient presents with    Hypertension    Anxiety    Insomnia     1. Have you been to the ER, urgent care clinic since your last visit? Hospitalized since your last visit? No    2. Have you seen or consulted any other health care providers outside of the [address] [place] [address] since your last visit? Include any pap smears or colon screening.  Yes When: 2/2017 Where: Dr. Melchor Daniels Reason for visit: vomiting, saw Cardiology for chest pain

## 2017-02-20 NOTE — PROGRESS NOTES
Chief Complaint   Patient presents with    Hypertension    Anxiety    Insomnia     HISTORY OF PRESENT ILLNESS  Naveed Gutierrez is a 58 y.o. female. HPI  Pt is here for follow up of htn, anxiety, insomnia. She has seen gi for the esophageal issue. She had a swallowing study but has not had the study advised during her admission a few months ago. Pt was noted to have some blood in her stool and is now scheduled to have a colonoscopy. She is still having vomiting. She is still taking phenergan, it has been increased to 25mg by gi. She has seen cards and been cleared by them. Pt feels her speech is getting better. She has decided to hold off on speech therapy. Pt notes that ativan really helps her to rest at night. She does need a refill today. New concerns today: none      Review of Systems   Constitutional: Negative. HENT: Negative. Respiratory: Negative. Cardiovascular: Negative. Gastrointestinal: Positive for vomiting. Neurological: Positive for speech change. Psychiatric/Behavioral: The patient is nervous/anxious and has insomnia. All other systems reviewed and are negative. Physical Exam  Physical Exam   Nursing note and vitals reviewed. Constitutional: She is oriented to person, place, and time. She appears well-developed and well-nourished. HENT:   Head: Normocephalic and atraumatic. Right Ear: External ear normal.   Left Ear: External ear normal.   Nose: Nose normal.   Eyes: Conjunctivae and EOM are normal.   Neck: Normal range of motion. Neck supple. No JVD present. Carotid bruit is not present. No thyromegaly present. Cardiovascular: Normal rate, regular rhythm, normal heart sounds and intact distal pulses. Exam reveals no gallop and no friction rub. No murmur heard. Pulmonary/Chest: Effort normal and breath sounds normal. She has no wheezes. She has no rhonchi. She has no rales. Abdominal: Soft.  Bowel sounds are normal.   Musculoskeletal: Normal range of motion. Neurological: She is alert and oriented to person, place, and time. Coordination normal.   Skin: Skin is warm and dry. Psychiatric: She has a normal mood and affect. Her behavior is normal. Judgment and thought content normal.     ASSESSMENT and PLAN  Roberta Velazquez was seen today for hypertension, anxiety and insomnia. Diagnoses and all orders for this visit:    Essential hypertension  Stable, cont pres tx plan. Anxiety  -     LORazepam (ATIVAN) 0.5 mg tablet; Take 1 pill by mouth po bid prn. Insomnia, unspecified type  -     LORazepam (ATIVAN) 0.5 mg tablet; Take 1 pill by mouth po bid prn.       Follow-up Disposition: 3 months; sooner prn

## 2017-03-21 DIAGNOSIS — R11.0 NAUSEA: ICD-10-CM

## 2017-04-11 ENCOUNTER — HOSPITAL ENCOUNTER (OUTPATIENT)
Dept: LAB | Age: 62
Discharge: HOME | End: 2017-04-11
Payer: MEDICARE

## 2017-04-11 ENCOUNTER — OFFICE VISIT (OUTPATIENT)
Dept: PAIN MANAGEMENT | Age: 62
End: 2017-04-11

## 2017-04-11 ENCOUNTER — HOSPITAL ENCOUNTER (OUTPATIENT)
Dept: LAB | Age: 62
Discharge: HOME | End: 2017-04-11

## 2017-04-11 VITALS — RESPIRATION RATE: 17 BRPM | HEART RATE: 87 BPM | DIASTOLIC BLOOD PRESSURE: 74 MMHG | SYSTOLIC BLOOD PRESSURE: 133 MMHG

## 2017-04-11 DIAGNOSIS — M50.30 DDD (DEGENERATIVE DISC DISEASE), CERVICAL: ICD-10-CM

## 2017-04-11 DIAGNOSIS — M96.1 POSTLAMINECTOMY SYNDROME, CERVICAL REGION: ICD-10-CM

## 2017-04-11 DIAGNOSIS — M50.00 CERVICAL DISC DISEASE WITH MYELOPATHY: ICD-10-CM

## 2017-04-11 DIAGNOSIS — Z98.890 S/P CERVICAL DISCECTOMY: ICD-10-CM

## 2017-04-11 DIAGNOSIS — M54.2 NECK PAIN: Primary | ICD-10-CM

## 2017-04-11 DIAGNOSIS — R19.4 FREQUENT BOWEL MOVEMENTS: ICD-10-CM

## 2017-04-11 DIAGNOSIS — G89.4 CHRONIC PAIN SYNDROME: ICD-10-CM

## 2017-04-11 DIAGNOSIS — Z98.1 STATUS POST CERVICAL SPINAL FUSION: ICD-10-CM

## 2017-04-11 DIAGNOSIS — Z98.1 STATUS POST CERVICAL SPINAL ARTHRODESIS: ICD-10-CM

## 2017-04-11 LAB — SENTARA SPECIMEN COL,SENBCF: NORMAL

## 2017-04-11 PROCEDURE — 99001 SPECIMEN HANDLING PT-LAB: CPT | Performed by: COLON & RECTAL SURGERY

## 2017-04-11 PROCEDURE — 93005 ELECTROCARDIOGRAM TRACING: CPT

## 2017-04-11 RX ORDER — OXYCODONE AND ACETAMINOPHEN 10; 325 MG/1; MG/1
.5-1 TABLET ORAL
Qty: 120 TAB | Refills: 0 | Status: SHIPPED | OUTPATIENT
Start: 2017-04-11 | End: 2017-06-26 | Stop reason: SDUPTHER

## 2017-04-11 RX ORDER — OXYCODONE AND ACETAMINOPHEN 10; 325 MG/1; MG/1
.5-1 TABLET ORAL
Qty: 120 TAB | Refills: 0 | Status: SHIPPED | OUTPATIENT
Start: 2017-05-10 | End: 2017-06-26 | Stop reason: SDUPTHER

## 2017-04-11 RX ORDER — TIZANIDINE 4 MG/1
4 TABLET ORAL
Qty: 120 TAB | Refills: 2 | Status: SHIPPED | OUTPATIENT
Start: 2017-04-11 | End: 2017-06-26 | Stop reason: SDUPTHER

## 2017-04-11 NOTE — PROGRESS NOTES
HISTORY OF PRESENT ILLNESS  Camila Pickett is a 58 y.o. female. HPI Comments: Meds help with pain control and quality of life. No new side effects reported today. Visit survey reviewed and will be scanned.  reviewed. Recent average level of pain(out of 10)-8  Chief complaint neck pain  Chronic pain  Using Zanaflex 4 mg every 6 hours as needed  Percocet 10 mg 4 times a day as needed  Medication helps improve general activity, walking, sleep  She continues to have chronic gastrointestinal issues. She continues to follow-up with her gastroenterologist and has an upcoming colonoscopy in about a week. Review of Systems   Constitutional: Negative for chills and fever. HENT: Negative for ear discharge and ear pain. Eyes: Negative for pain and discharge. Respiratory: Negative for hemoptysis and wheezing. Gastrointestinal: Negative for blood in stool and melena. Genitourinary: Negative for dysuria and flank pain. Musculoskeletal: Positive for back pain and neck pain. Skin: Negative for itching and rash. Neurological: Negative for seizures and loss of consciousness. Psychiatric/Behavioral: Negative for hallucinations, substance abuse and suicidal ideas. Physical Exam   Constitutional: She appears well-developed and well-nourished. She is cooperative. She does not have a sickly appearance. HENT:   Head: Normocephalic and atraumatic. Right Ear: External ear normal. No drainage. Left Ear: External ear normal. No drainage. Nose: Nose normal.   Eyes: Lids are normal. Right eye exhibits no discharge. Left eye exhibits no discharge. Right conjunctiva has no hemorrhage. Left conjunctiva has no hemorrhage. Neck: Neck supple. No tracheal deviation present. No thyroid mass present. Pulmonary/Chest: Effort normal. No respiratory distress. Neurological: She is alert. No cranial nerve deficit. Skin: Skin is intact. No rash noted.    Psychiatric: Her speech is normal. Her affect is not angry. She does not express inappropriate judgment. Nursing note and vitals reviewed. ASSESSMENT and PLAN  Encounter Diagnoses   Name Primary?  Neck pain Yes    Postlaminectomy syndrome, cervical region     Cervical disc disease with myelopathy     S/P cervical discectomy     Status post cervical spinal arthrodesis     Chronic pain syndrome     DDD (degenerative disc disease), cervical     Status post cervical spinal fusion    No indicators for medication misuse.  reviewed. Pain Meds and Quality Of Life have been reviewed. Nonpharmacologic therapy and non-opioid pharmacologic therapy were considered. If opioid therapy is prescribed, this is only if the expected benefits are anticipated to outweigh risks. Possible changes to treatment plan considered. Support/education given as needed. Today-medications are as listed. No significant changes to medications. Follow up -- 2 months.

## 2017-04-11 NOTE — MR AVS SNAPSHOT
Visit Information Date & Time Provider Department Dept. Phone Encounter #  
 4/11/2017 10:45 AM Ace Andino MD [facility] [phone] Follow-up Instructions Return in about 2 months (around 6/11/2017). Follow-up and Disposition History Your Appointments 4/11/2017 10:45 AM  
Follow Up with Ace Andino MD  
[facility] Appt Note: return in 2 months [address] [facility] [address]  
[phone] Jaelyn Gallardo 1348 [id number] 5/22/2017 10:00 AM  
Follow Up with Blake Rose MD  
[address] (--) Appt Note: fu  
 [address] [address] [address] [facility] [address]  
[phone]  
  
   
 [address] [facility] [address]  
  
    
 5/22/2017 10:15 AM  
Office Visit with Blake Rose MD  
[address] (--) Appt Note: [address] [address] [address] [address] [phone]  
  
   
 [address] [address] [address] [address] Upcoming Health Maintenance Date Due Hepatitis C Screening 1955 DTaP/Tdap/Td series (1 - Tdap) 1/29/1976 ZOSTER VACCINE AGE 60> 1/29/2015 PAP AKA CERVICAL CYTOLOGY 9/30/2017 BREAST CANCER SCRN MAMMOGRAM 11/2/2017 COLONOSCOPY 11/6/2019 Allergies as of 4/11/2017  Review Complete On: 4/11/2017 By: Ace Andino MD  
  
 Severity Noted Reaction Type Reactions Codeine  12/23/2013   Intolerance Nausea and Vomiting Pcn [Penicillins]    Not Reported This Time, Hives Tramadol  12/23/2013    Palpitations Vicodin [Hydrocodone-acetaminophen]  12/23/2013    Itching, Hives Current Immunizations  Reviewed on 6/25/2014 No immunizations on file. Not reviewed this visit You Were Diagnosed With   
  
 Codes Comments Neck pain    -  Primary ICD-10-CM: M54.2 ICD-9-CM: 723.1 Postlaminectomy syndrome, cervical region     ICD-10-CM: M96.1 ICD-9-CM: 722.81 Cervical disc disease with myelopathy     ICD-10-CM: M50.00 ICD-9-CM: 722.71 S/P cervical discectomy     ICD-10-CM: S21.188 ICD-9-CM: V45.89 Status post cervical spinal arthrodesis     ICD-10-CM: Z98.1 ICD-9-CM: V45.4 Chronic pain syndrome     ICD-10-CM: G89.4 ICD-9-CM: 338.4 DDD (degenerative disc disease), cervical     ICD-10-CM: M50.30 ICD-9-CM: 722.4 Status post cervical spinal fusion     ICD-10-CM: Z98.1 ICD-9-CM: V45.4 Vitals BP Pulse Resp LMP OB Status Smoking Status 133/74 87 17 12/30/1988 Hysterectomy Former Smoker Vitals History Preferred Pharmacy Pharmacy Name Phone [address] [facility], [address] Your Updated Medication List  
  
   
This list is accurate as of: 4/11/17 10:35 AM.  Always use your most recent med list.  
  
  
  
  
 aspirin delayed-release 81 mg tablet Take  by mouth daily. hydroCHLOROthiazide 25 mg tablet Commonly known as:  HYDRODIURIL Take 25 mg by mouth daily. levothyroxine 75 mcg tablet Commonly known as:  SYNTHROID Take 1 Tab by mouth Daily (before breakfast). LORazepam 0.5 mg tablet Commonly known as:  ATIVAN Take 1 pill by mouth po bid prn.  
  
 omeprazole 40 mg capsule Commonly known as:  PRILOSEC Take 1 Cap by mouth two (2) times a day. * oxyCODONE-acetaminophen  mg per tablet Commonly known as:  PERCOCET 10 Take 0.5-1 Tabs by mouth every six (6) hours as needed for Pain for up to 30 days. Max Daily Amount: 4 Tabs. * oxyCODONE-acetaminophen  mg per tablet Commonly known as:  PERCOCET 10 Take 0.5-1 Tabs by mouth every six (6) hours as needed for Pain for up to 30 days. Max Daily Amount: 4 Tabs. Start taking on:  5/10/2017  
  
 promethazine 25 mg tablet Commonly known as:  PHENERGAN  
 Take 25 mg by mouth every six (6) hours as needed for Nausea. tiZANidine 4 mg tablet Commonly known as:  Lauri Core Take 1 Tab by mouth every six (6) hours as needed for Pain (spasm) for up to 30 days. traZODone 50 mg tablet Commonly known as:  Fairy Pandy Take 1 Tab by mouth nightly as needed for Sleep. VITAMIN B-12 PO Take 1 tablet by mouth as needed. * Notice: This list has 2 medication(s) that are the same as other medications prescribed for you. Read the directions carefully, and ask your doctor or other care provider to review them with you. Prescriptions Printed Refills  
 oxyCODONE-acetaminophen (PERCOCET 10)  mg per tablet 0 Starting on: 5/10/2017 Sig: Take 0.5-1 Tabs by mouth every six (6) hours as needed for Pain for up to 30 days. Max Daily Amount: 4 Tabs. Class: Print Route: Oral  
 oxyCODONE-acetaminophen (PERCOCET 10)  mg per tablet 0 Sig: Take 0.5-1 Tabs by mouth every six (6) hours as needed for Pain for up to 30 days. Max Daily Amount: 4 Tabs. Class: Print Route: Oral  
  
Prescriptions Sent to Pharmacy Refills  
 tiZANidine (ZANAFLEX) 4 mg tablet 2 Sig: Take 1 Tab by mouth every six (6) hours as needed for Pain (spasm) for up to 30 days. Class: Normal  
 Pharmacy: [address] [place] [address], [address] [place] [address] #: [phone] Route: Oral  
  
Follow-up Instructions Return in about 2 months (around 6/11/2017). Introducing hospitals & HEALTH SERVICES! Jose Soniagermania introduces [facility] patient portal. Now you can access parts of your medical record, email your doctor's office, and request medication refills online. 1. In your internet browser, go to [email or web address] 2. Click on the First Time User? Click Here link in the Sign In box. You will see the New Member Sign Up page. 3. Enter your [facility] Access Code exactly as it appears below.  You will not need to use this code after youve completed the sign-up process. If you do not sign up before the expiration date, you must request a new code. · [employer] Access Code: [email or web address] Expires: 7/10/2017  9:51 AM 
 
4. Enter the last four digits of your Social Security Number (xxxx) and Date of Birth (mm/dd/yyyy) as indicated and click Submit. You will be taken to the next sign-up page. 5. Create a [employer] ID. This will be your [employer] login ID and cannot be changed, so think of one that is secure and easy to remember. 6. Create a [employer] password. You can change your password at any time. 7. Enter your Password Reset Question and Answer. This can be used at a later time if you forget your password. 8. Enter your e-mail address. You will receive e-mail notification when new information is available in [address] 9. Click Sign Up. You can now view and download portions of your medical record. 10. Click the Download Summary menu link to download a portable copy of your medical information. If you have questions, please visit the Frequently Asked Questions section of the [employer] website. Remember, [employer] is NOT to be used for urgent needs. For medical emergencies, dial 911. Now available from your iPhone and Android! Please provide this summary of care documentation to your next provider. Your primary care clinician is listed as Sai Archer. If you have any questions after today's visit, please call [phone].

## 2017-04-11 NOTE — PROGRESS NOTES
Nursing Notes    Patient presents to the office today in follow-up. Reviewed medications with counts as follows:    Rx Date filled Qty Dispensed Pill Count Last Dose Short   Percocet 10/325 3/15/17 120 10 today no   Ms. Pasquale Huddleston has a reminder for a \"due or due soon\" health maintenance. I have asked that she contact her primary care provider for follow-up on this health maintenance. POC UDS was not performed in office today    Any new labs or imaging since last appointment? NO    Have you been to an emergency room (ER) or urgent care clinic since your last visit? NO            Have you been hospitalized since your last visit? NO     If yes, where, when, and reason for visit? Have you seen or consulted any other health care providers outside of the [facility]""  since your last visit? YES     If yes, where, when, and reason for visit?

## 2017-04-12 LAB
ATRIAL RATE: 104 BPM
CALCULATED P AXIS, ECG09: 78 DEGREES
CALCULATED R AXIS, ECG10: 17 DEGREES
CALCULATED T AXIS, ECG11: 55 DEGREES
DIAGNOSIS, 93000: NORMAL
P-R INTERVAL, ECG05: 124 MS
Q-T INTERVAL, ECG07: 332 MS
QRS DURATION, ECG06: 70 MS
QTC CALCULATION (BEZET), ECG08: 436 MS
VENTRICULAR RATE, ECG03: 104 BPM

## 2017-04-18 LAB — COLONOSCOPY, EXTERNAL: NORMAL

## 2017-04-18 RX ORDER — PROMETHAZINE HYDROCHLORIDE 12.5 MG/1
TABLET ORAL
Qty: 90 TAB | Refills: 0 | OUTPATIENT
Start: 2017-04-18

## 2017-04-26 DIAGNOSIS — F41.9 ANXIETY: ICD-10-CM

## 2017-04-26 DIAGNOSIS — G47.00 INSOMNIA, UNSPECIFIED TYPE: ICD-10-CM

## 2017-04-26 RX ORDER — LORAZEPAM 0.5 MG/1
TABLET ORAL
Qty: 60 TAB | Refills: 0 | Status: SHIPPED | OUTPATIENT
Start: 2017-04-26 | End: 2017-05-22 | Stop reason: SDUPTHER

## 2017-05-22 ENCOUNTER — OFFICE VISIT (OUTPATIENT)
Dept: FAMILY MEDICINE CLINIC | Age: 62
End: 2017-05-22

## 2017-05-22 VITALS
HEART RATE: 71 BPM | WEIGHT: 126 LBS | RESPIRATION RATE: 16 BRPM | BODY MASS INDEX: 20.99 KG/M2 | DIASTOLIC BLOOD PRESSURE: 76 MMHG | TEMPERATURE: 97.6 F | OXYGEN SATURATION: 100 % | HEIGHT: 65 IN | SYSTOLIC BLOOD PRESSURE: 129 MMHG

## 2017-05-22 DIAGNOSIS — R07.9 CHEST PAIN, UNSPECIFIED TYPE: ICD-10-CM

## 2017-05-22 DIAGNOSIS — F41.9 ANXIETY: ICD-10-CM

## 2017-05-22 DIAGNOSIS — I10 ESSENTIAL HYPERTENSION: ICD-10-CM

## 2017-05-22 DIAGNOSIS — G47.00 INSOMNIA, UNSPECIFIED TYPE: ICD-10-CM

## 2017-05-22 DIAGNOSIS — E07.9 THYROID DISEASE: ICD-10-CM

## 2017-05-22 DIAGNOSIS — Z11.59 ENCOUNTER FOR HEPATITIS C SCREENING TEST FOR LOW RISK PATIENT: ICD-10-CM

## 2017-05-22 DIAGNOSIS — R11.2 NAUSEA AND VOMITING, INTRACTABILITY OF VOMITING NOT SPECIFIED, UNSPECIFIED VOMITING TYPE: Primary | ICD-10-CM

## 2017-05-22 RX ORDER — LORAZEPAM 0.5 MG/1
TABLET ORAL
Qty: 60 TAB | Refills: 0 | Status: SHIPPED | OUTPATIENT
Start: 2017-05-22 | End: 2017-06-27 | Stop reason: SDUPTHER

## 2017-05-22 RX ORDER — PROMETHAZINE HYDROCHLORIDE 25 MG/1
25 TABLET ORAL
Qty: 60 TAB | Refills: 0 | Status: SHIPPED | OUTPATIENT
Start: 2017-05-22 | End: 2017-11-08 | Stop reason: SDUPTHER

## 2017-05-22 RX ORDER — TRAZODONE HYDROCHLORIDE 50 MG/1
50 TABLET ORAL
Qty: 30 TAB | Refills: 5 | Status: SHIPPED | OUTPATIENT
Start: 2017-05-22 | End: 2017-10-25 | Stop reason: SDUPTHER

## 2017-05-22 NOTE — PROGRESS NOTES
HISTORY OF PRESENT ILLNESS  Nik Zamorano is a 58 y.o. female. Chief Complaint   Patient presents with    Follow-up    Anxiety    Insomnia    Medication Refill    Chest Pain     Intermittent sharp pain, last appt w/DR. Pruett-Cardiology in Feb, pt denies any SOB       HPI  Pt is here for follow up of Anxiety, and Insomnia. Pt does need medication refills today. Pt requests electronic refills. New concerns today: Pt reports having intermittent sharp chest pain x 1 week, pt denies any SOB. She had 3 days of eating well without any vomiting until yesterday evening. The pain was happening when she was not having any vomiting. Pt notes that she is also having muscle spasms in her rectum. She had a colonoscopy last month. She was told that it was nl; next colonoscopy is to be done in 5 yrs. Review of Systems   Constitutional: Negative. HENT: Negative. Respiratory: Negative. Cardiovascular: Positive for chest pain. Gastrointestinal: Positive for nausea and vomiting. All other systems reviewed and are negative. Physical Exam  Physical Exam   Nursing note and vitals reviewed. Constitutional: She is oriented to person, place, and time. She appears well-developed and well-nourished. HENT:   Head: Normocephalic and atraumatic. Right Ear: External ear normal.   Left Ear: External ear normal.   Nose: Nose normal.   Eyes: Conjunctivae and EOM are normal.   Neck: Normal range of motion. Neck supple. No JVD present. Carotid bruit is not present. No thyromegaly present. Cardiovascular: Normal rate, regular rhythm, normal heart sounds and intact distal pulses. Exam reveals no gallop and no friction rub. No murmur heard. Pulmonary/Chest: Effort normal and breath sounds normal. She has no wheezes. She has no rhonchi. She has no rales. Abdominal: Soft. Bowel sounds are normal.   Musculoskeletal: Normal range of motion.    Neurological: She is alert and oriented to person, place, and time. Coordination normal.   Skin: Skin is warm and dry. Psychiatric: She has a normal mood and affect. Her behavior is normal. Judgment and thought content normal.     ASSESSMENT and PLAN  Raj Gao was seen today for follow-up, anxiety, insomnia, medication refill and chest pain. Diagnoses and all orders for this visit:    Nausea and vomiting, intractability of vomiting not specified, unspecified vomiting type  -     promethazine (PHENERGAN) 25 mg tablet; Take 1 Tab by mouth every six (6) hours as needed for Nausea. -     [place] ESOPHAGEAL MOTILITY STUDY W/INTERP&RPT    Anxiety  -     LORazepam (ATIVAN) 0.5 mg tablet; take 1 tablet by mouth twice a day if needed    Insomnia, unspecified type  -     LORazepam (ATIVAN) 0.5 mg tablet; take 1 tablet by mouth twice a day if needed  -     traZODone (DESYREL) 50 mg tablet; Take 1 Tab by mouth nightly as needed for Sleep. Essential hypertension  Stable, cont pres tx plan. Labs ordered. Thyroid disease  Labs ordered. Screen for hep c in low risk pt  Hep c ordered. Chest pain, unspecified type  Pt to sched f/u appt with cards.          Follow-up Disposition: 1 month; sooner prn

## 2017-05-22 NOTE — PROGRESS NOTES
Edi Lowry 58 y.o. female   Chief Complaint   Patient presents with    Follow-up    Anxiety    Insomnia    Medication Refill    Chest Pain     Intermittent sharp pain, last appt w/DR. Pruett-Cardiology in Feb, pt denies any SOB         1. Have you been to the ER, urgent care clinic since your last visit? Hospitalized since your last visit? No    2. Have you seen or consulted any other health care providers outside of the [address] [place] [address] since your last visit? Include any pap smears or colon screening.  No

## 2017-05-22 NOTE — MR AVS SNAPSHOT
Visit Information Date & Time Provider Department Dept. Phone Encounter #  
 5/22/2017 10:15 AM Erich Up MD [address] [id number] Follow-up Instructions Return in about 4 weeks (around 6/19/2017). Your Appointments 6/6/2017 10:45 AM  
Follow Up with Naveen Camacho MD  
[facility] [address] [place] [address]) Appt Note: return in 3 months [address] [facility] [id number] [phone] [facility] [address] [address] Upcoming Health Maintenance Date Due Hepatitis C Screening 1955 DTaP/Tdap/Td series (1 - Tdap) 1/29/1976 ZOSTER VACCINE AGE 60> 1/29/2015 INFLUENZA AGE 9 TO ADULT 8/1/2017 PAP AKA CERVICAL CYTOLOGY 9/30/2017 BREAST CANCER SCRN MAMMOGRAM 11/2/2017 COLONOSCOPY 4/18/2022 Allergies as of 5/22/2017  Review Complete On: 5/22/2017 By: Erich Up MD  
  
 Severity Noted Reaction Type Reactions Codeine  12/23/2013   Intolerance Nausea and Vomiting Pcn [Penicillins]    Not Reported This Time, Hives Tramadol  12/23/2013    Palpitations Vicodin [Hydrocodone-acetaminophen]  12/23/2013    Itching, Hives Current Immunizations  Reviewed on 6/25/2014 No immunizations on file. Not reviewed this visit You Were Diagnosed With   
  
 Codes Comments Nausea and vomiting, intractability of vomiting not specified, unspecified vomiting type    -  Primary ICD-10-CM: R11.2 ICD-9-CM: 787.01 Anxiety     ICD-10-CM: F41.9 ICD-9-CM: 300.00 Insomnia, unspecified type     ICD-10-CM: G47.00 ICD-9-CM: 780.52 Essential hypertension     ICD-10-CM: I10 
ICD-9-CM: 401.9 Chest pain, unspecified type     ICD-10-CM: R07.9 ICD-9-CM: 786.50 Encounter for hepatitis C screening test for low risk patient     ICD-10-CM: Z11.59 
ICD-9-CM: V73.89 Thyroid disease     ICD-10-CM: E07.9 ICD-9-CM: 246. 9 Vitals BP Pulse Temp Resp Height(growth percentile) Weight(growth percentile) 129/76 71 97.6 °F (36.4 °C) (Oral) 16 5' 5\" (1.651 m) 126 lb (57.2 kg) LMP SpO2 BMI OB Status Smoking Status 12/30/1988 100% 20.97 kg/m2 Hysterectomy Former Smoker BMI and BSA Data Body Mass Index Body Surface Area  
 20.97 kg/m 2 1.62 m 2 Preferred Pharmacy Pharmacy Name Phone [address] [facility], [address] Your Updated Medication List  
  
   
This list is accurate as of: 5/22/17 11:33 AM.  Always use your most recent med list.  
  
  
  
  
 aspirin delayed-release 81 mg tablet Take  by mouth daily. hydroCHLOROthiazide 25 mg tablet Commonly known as:  HYDRODIURIL Take 25 mg by mouth daily. levothyroxine 75 mcg tablet Commonly known as:  SYNTHROID Take 1 Tab by mouth Daily (before breakfast). LORazepam 0.5 mg tablet Commonly known as:  ATIVAN  
take 1 tablet by mouth twice a day if needed  
  
 omeprazole 40 mg capsule Commonly known as:  PRILOSEC Take 1 Cap by mouth two (2) times a day. oxyCODONE-acetaminophen  mg per tablet Commonly known as:  PERCOCET 10 Take 0.5-1 Tabs by mouth every six (6) hours as needed for Pain for up to 30 days. Max Daily Amount: 4 Tabs. promethazine 25 mg tablet Commonly known as:  PHENERGAN Take 1 Tab by mouth every six (6) hours as needed for Nausea. traZODone 50 mg tablet Commonly known as:  Penelope Edu Take 1 Tab by mouth nightly as needed for Sleep. VITAMIN B-12 PO Take 1 tablet by mouth as needed. Prescriptions Printed Refills LORazepam (ATIVAN) 0.5 mg tablet 0 Sig: take 1 tablet by mouth twice a day if needed Class: Print Prescriptions Sent to Pharmacy Refills  
 traZODone (DESYREL) 50 mg tablet 5 Sig: Take 1 Tab by mouth nightly as needed for Sleep.   
 Class: Normal  
 Pharmacy: [address] [facility], [address] [facility] Ph #: [phone] Route: Oral  
 promethazine (PHENERGAN) 25 mg tablet 0 Sig: Take 1 Tab by mouth every six (6) hours as needed for Nausea. Class: Normal  
 Pharmacy: [address] [facility], [address] [facility] Ph #: [phone] Route: Oral  
  
We Performed the Following [place] ESOPHAGEAL MOTILITY STUDY W/INTERP&RPT [78004 CPT(R)] Follow-up Instructions Return in about 4 weeks (around 6/19/2017). To-Do List   
 05/22/2017 Lab:  HEPATITIS C AB   
  
 05/22/2017 Lab:  LIPID PANEL   
  
 05/22/2017 Lab:  METABOLIC PANEL, COMPREHENSIVE   
  
 05/22/2017 Lab:  TSH 3RD GENERATION Patient Instructions Please contact our office if you have any questions about your visit today. Introducing [facility] SERVICES! Dickson Freeman introduces [employer] patient portal. Now you can access parts of your medical record, email your doctor's office, and request medication refills online. 1. In your internet browser, go to [email or web address] 2. Click on the First Time User? Click Here link in the Sign In box. You will see the New Member Sign Up page. 3. Enter your [employer] Access Code exactly as it appears below. You will not need to use this code after youve completed the sign-up process. If you do not sign up before the expiration date, you must request a new code. · [employer] Access Code: [email or web address] Expires: 7/10/2017  9:51 AM 
 
4. Enter the last four digits of your Social Security Number (xxxx) and Date of Birth (mm/dd/yyyy) as indicated and click Submit. You will be taken to the next sign-up page. 5. Create a [employer] ID. This will be your [employer] login ID and cannot be changed, so think of one that is secure and easy to remember. 6. Create a [employer] password. You can change your password at any time. 7. Enter your Password Reset Question and Answer. This can be used at a later time if you forget your password. 8. Enter your e-mail address. You will receive e-mail notification when new information is available in [address] 9. Click Sign Up. You can now view and download portions of your medical record. 10. Click the Download Summary menu link to download a portable copy of your medical information. If you have questions, please visit the Frequently Asked Questions section of the [employer] website. Remember, [employer] is NOT to be used for urgent needs. For medical emergencies, dial 911. Now available from your iPhone and Android! Please provide this summary of care documentation to your next provider. Your primary care clinician is listed as Gerda Thomas. If you have any questions after today's visit, please call [phone].

## 2017-05-25 DIAGNOSIS — K21.9 GASTROESOPHAGEAL REFLUX DISEASE WITHOUT ESOPHAGITIS: ICD-10-CM

## 2017-05-25 RX ORDER — OMEPRAZOLE 40 MG/1
40 CAPSULE, DELAYED RELEASE ORAL 2 TIMES DAILY
Qty: 60 CAP | Refills: 5 | Status: SHIPPED | OUTPATIENT
Start: 2017-05-25 | End: 2019-12-20 | Stop reason: SDUPTHER

## 2017-06-20 ENCOUNTER — HOSPITAL ENCOUNTER (OUTPATIENT)
Dept: LAB | Age: 62
Discharge: HOME | End: 2017-06-20
Payer: MEDICARE

## 2017-06-20 DIAGNOSIS — E07.9 THYROID DISEASE: ICD-10-CM

## 2017-06-20 DIAGNOSIS — I10 ESSENTIAL HYPERTENSION: ICD-10-CM

## 2017-06-20 DIAGNOSIS — Z11.59 ENCOUNTER FOR HEPATITIS C SCREENING TEST FOR LOW RISK PATIENT: ICD-10-CM

## 2017-06-20 LAB
ALBUMIN SERPL BCP-MCNC: 3.7 G/DL (ref 3.4–5)
ALBUMIN/GLOB SERPL: 1.2 {RATIO} (ref 0.8–1.7)
ALP SERPL-CCNC: 109 U/L (ref 45–117)
ALT SERPL-CCNC: 33 U/L (ref 13–56)
ANION GAP BLD CALC-SCNC: 8 MMOL/L (ref 3–18)
AST SERPL W P-5'-P-CCNC: 41 U/L (ref 15–37)
BILIRUB SERPL-MCNC: 0.5 MG/DL (ref 0.2–1)
BUN SERPL-MCNC: 13 MG/DL (ref 7–18)
BUN/CREAT SERPL: 15 (ref 12–20)
CALCIUM SERPL-MCNC: 8.7 MG/DL (ref 8.5–10.1)
CHLORIDE SERPL-SCNC: 104 MMOL/L (ref 100–108)
CHOLEST SERPL-MCNC: 169 MG/DL
CO2 SERPL-SCNC: 27 MMOL/L (ref 21–32)
CREAT SERPL-MCNC: 0.85 MG/DL (ref 0.6–1.3)
GLOBULIN SER CALC-MCNC: 3.1 G/DL (ref 2–4)
GLUCOSE SERPL-MCNC: 82 MG/DL (ref 74–99)
HDLC SERPL-MCNC: 93 MG/DL (ref 40–60)
HDLC SERPL: 1.8 {RATIO} (ref 0–5)
LDLC SERPL CALC-MCNC: 64.4 MG/DL (ref 0–100)
LIPID PROFILE,FLP: ABNORMAL
POTASSIUM SERPL-SCNC: 3.9 MMOL/L (ref 3.5–5.5)
PROT SERPL-MCNC: 6.8 G/DL (ref 6.4–8.2)
SODIUM SERPL-SCNC: 139 MMOL/L (ref 136–145)
TRIGL SERPL-MCNC: 58 MG/DL (ref ?–150)
TSH SERPL DL<=0.05 MIU/L-ACNC: 3.05 UIU/ML (ref 0.36–3.74)
VLDLC SERPL CALC-MCNC: 11.6 MG/DL

## 2017-06-20 PROCEDURE — 86803 HEPATITIS C AB TEST: CPT | Performed by: FAMILY MEDICINE

## 2017-06-20 PROCEDURE — 84443 ASSAY THYROID STIM HORMONE: CPT | Performed by: FAMILY MEDICINE

## 2017-06-20 PROCEDURE — 80053 COMPREHEN METABOLIC PANEL: CPT | Performed by: FAMILY MEDICINE

## 2017-06-20 PROCEDURE — 80061 LIPID PANEL: CPT | Performed by: FAMILY MEDICINE

## 2017-06-20 PROCEDURE — 36415 COLL VENOUS BLD VENIPUNCTURE: CPT | Performed by: FAMILY MEDICINE

## 2017-06-21 LAB
HCV AB SER IA-ACNC: 0.05 INDEX
HCV AB SERPL QL IA: NEGATIVE
HCV COMMENT,HCGAC: NORMAL

## 2017-06-26 ENCOUNTER — OFFICE VISIT (OUTPATIENT)
Dept: PAIN MANAGEMENT | Age: 62
End: 2017-06-26

## 2017-06-26 VITALS
WEIGHT: 130 LBS | DIASTOLIC BLOOD PRESSURE: 73 MMHG | HEIGHT: 65 IN | SYSTOLIC BLOOD PRESSURE: 142 MMHG | BODY MASS INDEX: 21.66 KG/M2 | HEART RATE: 85 BPM

## 2017-06-26 DIAGNOSIS — Z98.890 S/P CERVICAL DISCECTOMY: ICD-10-CM

## 2017-06-26 DIAGNOSIS — M50.30 DDD (DEGENERATIVE DISC DISEASE), CERVICAL: ICD-10-CM

## 2017-06-26 DIAGNOSIS — M50.00 CERVICAL DISC DISEASE WITH MYELOPATHY: ICD-10-CM

## 2017-06-26 DIAGNOSIS — M54.2 NECK PAIN: Primary | ICD-10-CM

## 2017-06-26 DIAGNOSIS — M96.1 POSTLAMINECTOMY SYNDROME, CERVICAL REGION: ICD-10-CM

## 2017-06-26 DIAGNOSIS — G44.86 CERVICOGENIC HEADACHE: ICD-10-CM

## 2017-06-26 DIAGNOSIS — Z98.1 STATUS POST CERVICAL SPINAL FUSION: ICD-10-CM

## 2017-06-26 DIAGNOSIS — Z98.1 STATUS POST CERVICAL SPINAL ARTHRODESIS: ICD-10-CM

## 2017-06-26 DIAGNOSIS — G89.4 CHRONIC PAIN SYNDROME: ICD-10-CM

## 2017-06-26 RX ORDER — TIZANIDINE 4 MG/1
4 TABLET ORAL
Qty: 120 TAB | Refills: 2 | Status: SHIPPED | OUTPATIENT
Start: 2017-06-26 | End: 2017-09-25 | Stop reason: SDUPTHER

## 2017-06-26 RX ORDER — OXYCODONE AND ACETAMINOPHEN 10; 325 MG/1; MG/1
.5-1 TABLET ORAL
Qty: 120 TAB | Refills: 0 | Status: SHIPPED | OUTPATIENT
Start: 2017-07-25 | End: 2017-09-25 | Stop reason: SDUPTHER

## 2017-06-26 RX ORDER — OXYCODONE AND ACETAMINOPHEN 10; 325 MG/1; MG/1
.5-1 TABLET ORAL
Qty: 120 TAB | Refills: 0 | Status: SHIPPED | OUTPATIENT
Start: 2017-06-26 | End: 2017-06-26 | Stop reason: SDUPTHER

## 2017-06-26 RX ORDER — OXYCODONE AND ACETAMINOPHEN 10; 325 MG/1; MG/1
.5-1 TABLET ORAL
Qty: 120 TAB | Refills: 0 | Status: SHIPPED | OUTPATIENT
Start: 2017-08-24 | End: 2017-09-25 | Stop reason: SDUPTHER

## 2017-06-26 NOTE — MR AVS SNAPSHOT
Visit Information Date & Time Provider Department Dept. Phone Encounter #  
 6/26/2017  2:45 PM Mauri Andrea MD [address] [place] [address] for Pain Management [phone] [id number] Follow-up Instructions Return in about 3 months (around 9/26/2017). Follow-up and Disposition History Your Appointments 7/5/2017 11:40 AM  
Follow Up with Kait [place] MD  
[facility] ([address]) Appt Note: c/o chest pains referred by Rafael Alejandre to make an appt./not active today-/Declined to see Negrito Gomez [id number] [phone] [place] [id number]  
  
    
 7/10/2017 10:15 AM  
Follow Up with Cindy Atkinson MD  
[facility] (--) Appt Note: 1 month fu; fu  
 [address] [id number] [phone]  
  
   
 [address] [id number] Upcoming Health Maintenance Date Due DTaP/Tdap/Td series (1 - Tdap) 1/29/1976 ZOSTER VACCINE AGE 60> 1/29/2015 PAP AKA CERVICAL CYTOLOGY 9/30/2017 INFLUENZA AGE 9 TO ADULT 8/1/2017 BREAST CANCER SCRN MAMMOGRAM 11/2/2017 COLONOSCOPY 4/18/2022 Allergies as of 6/26/2017  Review Complete On: 6/26/2017 By: Mauri Andrea MD  
  
 Severity Noted Reaction Type Reactions Codeine  12/23/2013   Intolerance Nausea and Vomiting Pcn [Penicillins]    Not Reported This Time, Hives Tramadol  12/23/2013    Palpitations Vicodin [Hydrocodone-acetaminophen]  12/23/2013    Itching, Hives Current Immunizations  Reviewed on 6/25/2014 No immunizations on file. Not reviewed this visit You Were Diagnosed With   
  
 Codes Comments Neck pain    -  Primary ICD-10-CM: M54.2 ICD-9-CM: 723.1 Postlaminectomy syndrome, cervical region     ICD-10-CM: M96.1 ICD-9-CM: 722.81 Cervical disc disease with myelopathy     ICD-10-CM: M50.00 ICD-9-CM: 722.71 S/P cervical discectomy     ICD-10-CM: P15.210 ICD-9-CM: V45.89 Status post cervical spinal arthrodesis     ICD-10-CM: Z98.1 ICD-9-CM: V45.4 Chronic pain syndrome     ICD-10-CM: G89.4 ICD-9-CM: 338.4 DDD (degenerative disc disease), cervical     ICD-10-CM: M50.30 ICD-9-CM: 722.4 Status post cervical spinal fusion     ICD-10-CM: Z98.1 ICD-9-CM: V45.4 Cervicogenic headache     ICD-10-CM: Winston Lied ICD-9-CM: 438. 0 Vitals BP Pulse Height(growth percentile) Weight(growth percentile) LMP BMI  
 142/73 85 5' 5\" (1.651 m) 130 lb (59 kg) 12/30/1988 21.63 kg/m2 OB Status Smoking Status Hysterectomy Former Smoker Vitals History BMI and BSA Data Body Mass Index Body Surface Area  
 21.63 kg/m 2 1.64 m 2 Preferred Pharmacy Pharmacy Name Phone [address] [facility], [address] Your Updated Medication List  
  
   
This list is accurate as of: 6/26/17  3:16 PM.  Always use your most recent med list.  
  
  
  
  
 aspirin delayed-release 81 mg tablet Take  by mouth daily. hydroCHLOROthiazide 25 mg tablet Commonly known as:  HYDRODIURIL Take 25 mg by mouth daily. levothyroxine 75 mcg tablet Commonly known as:  SYNTHROID Take 1 Tab by mouth Daily (before breakfast). LORazepam 0.5 mg tablet Commonly known as:  ATIVAN  
take 1 tablet by mouth twice a day if needed  
  
 omeprazole 40 mg capsule Commonly known as:  PRILOSEC Take 1 Cap by mouth two (2) times a day. * oxyCODONE-acetaminophen  mg per tablet Commonly known as:  PERCOCET 10 Take 0.5-1 Tabs by mouth every six (6) hours as needed for Pain for up to 30 days. Max Daily Amount: 4 Tabs. Start taking on:  7/25/2017 * oxyCODONE-acetaminophen  mg per tablet Commonly known as:  PERCOCET 10 Take 0.5-1 Tabs by mouth every six (6) hours as needed for Pain for up to 30 days. Max Daily Amount: 4 Tabs. Start taking on:  8/24/2017  
  
 promethazine 25 mg tablet Commonly known as:  PHENERGAN Take 1 Tab by mouth every six (6) hours as needed for Nausea. tiZANidine 4 mg tablet Commonly known as:  Garyarlean Levans Take 1 Tab by mouth every six (6) hours as needed for Pain (spasm) for up to 30 days. traZODone 50 mg tablet Commonly known as:  [place]ge [place] Take 1 Tab by mouth nightly as needed for Sleep. VITAMIN B-12 PO Take 1 tablet by mouth as needed. * Notice: This list has 2 medication(s) that are the same as other medications prescribed for you. Read the directions carefully, and ask your doctor or other care provider to review them with you. Prescriptions Printed Refills  
 oxyCODONE-acetaminophen (PERCOCET 10)  mg per tablet 0 Starting on: 7/25/2017 Sig: Take 0.5-1 Tabs by mouth every six (6) hours as needed for Pain for up to 30 days. Max Daily Amount: 4 Tabs. Class: Print Route: Oral  
 oxyCODONE-acetaminophen (PERCOCET 10)  mg per tablet 0 Starting on: 8/24/2017 Sig: Take 0.5-1 Tabs by mouth every six (6) hours as needed for Pain for up to 30 days. Max Daily Amount: 4 Tabs. Class: Print Route: Oral  
  
Prescriptions Sent to Pharmacy Refills  
 tiZANidine (ZANAFLEX) 4 mg tablet 2 Sig: Take 1 Tab by mouth every six (6) hours as needed for Pain (spasm) for up to 30 days. Class: Normal  
 Pharmacy: [id number], [facility] #: [phone] Route: Oral  
  
Follow-up Instructions Return in about 3 months (around 9/26/2017). Introducing [facility]! Yamila Rodrigues introduces [facility] patient portal. Now you can access parts of your medical record, email your doctor's office, and request medication refills online. 1. In your internet browser, go to [email or web address] 2. Click on the First Time User? Click Here link in the Sign In box. You will see the New Member Sign Up page. 3. Enter your dELiAs Access Code exactly as it appears below. You will not need to use this code after youve completed the sign-up process. If you do not sign up before the expiration date, you must request a new code. · dELiAs Access Code: [email or web address] Expires: 7/10/2017  9:51 AM 
 
4. Enter the last four digits of your Social Security Number (xxxx) and Date of Birth (mm/dd/yyyy) as indicated and click Submit. You will be taken to the next sign-up page. 5. Create a dELiAs ID. This will be your dELiAs login ID and cannot be changed, so think of one that is secure and easy to remember. 6. Create a dELiAs password. You can change your password at any time. 7. Enter your Password Reset Question and Answer. This can be used at a later time if you forget your password. 8. Enter your e-mail address. You will receive e-mail notification when new information is available in [address] 9. Click Sign Up. You can now view and download portions of your medical record. 10. Click the Download Summary menu link to download a portable copy of your medical information. If you have questions, please visit the Frequently Asked Questions section of the dELiAs website. Remember, dELiAs is NOT to be used for urgent needs. For medical emergencies, dial 911. Now available from your iPhone and Android! Please provide this summary of care documentation to your next provider. Your primary care clinician is listed as Xiomara Barrow. If you have any questions after today's visit, please call [phone].

## 2017-06-26 NOTE — PROGRESS NOTES
Nursing Notes    Patient presents to the office today in follow-up. Patient rates her pain at 8/10 on the numerical pain scale. Reviewed medications with counts as follows:    Rx Date filled Qty Dispensed Pill Count Last Dose Short   Percocet 10/325 mg - pt was supposed to be back in the office at the beginning of the month but was not able to make it due to a family situation 05/10/17 120 0 06/08/17 no                                  POC UDS was not performed in office today. Any new labs or imaging since last appointment? NO    Have you been to an emergency room (ER) or urgent care clinic since your last visit? NO            Have you been hospitalized since your last visit? NO     If yes, where, when, and reason for visit? Have you seen or consulted any other health care providers outside of the [address] [place] [address]  since your last visit? YES     If yes, where, when, and reason for visit? pcp    HM deferred to pcp.

## 2017-06-26 NOTE — PROGRESS NOTES
HISTORY OF PRESENT ILLNESS  Chago Reyez is a 58 y.o. female. HPI Comments: Meds help with pain control and quality of life. No new side effects reported today. Visit survey reviewed and will be scanned.  reviewed. Recent average level of pain(out of 10)-8  Chief complaint neck pain  Chronic pain syndrome  Using Percocet 10 mg up to 4 day as needed  Tizanidine 4 mg every 6 hours as needed  80% complete relief in the past 30 days  She has recently been out of medicine. She had to travel up to [place] for family issues and missed her appointment. She did reschedule. Measuring clinical outcomes of chronic pain patients. This was reviewed today. The survey will be scanned. Please see the survey for details. Total score-8      Review of Systems   Constitutional: Negative for chills and fever. HENT: Negative for ear discharge and ear pain. Eyes: Negative for pain and discharge. Respiratory: Negative for hemoptysis and wheezing. Gastrointestinal: Negative for blood in stool and melena. Genitourinary: Negative for dysuria and flank pain. Musculoskeletal: Positive for back pain and neck pain. Skin: Negative for itching and rash. Neurological: Negative for seizures and loss of consciousness. Psychiatric/Behavioral: Negative for hallucinations, substance abuse and suicidal ideas. Physical Exam   Constitutional: She appears well-developed and well-nourished. She is cooperative. She does not have a sickly appearance. HENT:   Head: Normocephalic and atraumatic. Right Ear: External ear normal. No drainage. Left Ear: External ear normal. No drainage. Nose: Nose normal.   Eyes: Lids are normal. Right eye exhibits no discharge. Left eye exhibits no discharge. Right conjunctiva has no hemorrhage. Left conjunctiva has no hemorrhage. Neck: Neck supple. No tracheal deviation present. No thyroid mass present. Pulmonary/Chest: Effort normal. No respiratory distress. Neurological: She is alert. No cranial nerve deficit. Skin: Skin is intact. No rash noted. Psychiatric: Her speech is normal. Her affect is not angry. She does not express inappropriate judgment. Nursing note and vitals reviewed. ASSESSMENT and PLAN  Encounter Diagnoses   Name Primary?  Neck pain Yes    Postlaminectomy syndrome, cervical region     Cervical disc disease with myelopathy     S/P cervical discectomy     Status post cervical spinal arthrodesis     Chronic pain syndrome     DDD (degenerative disc disease), cervical     Status post cervical spinal fusion     Cervicogenic headache    No indicators for recent medication misuse.  reviewed. Pain Meds and Quality Of Life have been reviewed. Nonpharmacologic therapy and non-opioid pharmacologic therapy were considered. If opioid therapy is prescribed, this is only if the expected benefits are anticipated to outweigh risks. Possible changes to treatment plan considered. Support/education given as needed. Today-medications are as listed. No significant changes to medications. Follow up -- 3 months.      She prefers and is requesting a 3 month follow-up

## 2017-06-27 DIAGNOSIS — F41.9 ANXIETY: ICD-10-CM

## 2017-06-27 DIAGNOSIS — G47.00 INSOMNIA, UNSPECIFIED TYPE: ICD-10-CM

## 2017-06-27 RX ORDER — LORAZEPAM 0.5 MG/1
TABLET ORAL
Qty: 60 TAB | Refills: 0 | Status: SHIPPED | OUTPATIENT
Start: 2017-06-27 | End: 2017-08-01 | Stop reason: SDUPTHER

## 2017-08-01 DIAGNOSIS — G47.00 INSOMNIA, UNSPECIFIED TYPE: ICD-10-CM

## 2017-08-01 DIAGNOSIS — F41.9 ANXIETY: ICD-10-CM

## 2017-08-02 RX ORDER — LORAZEPAM 0.5 MG/1
TABLET ORAL
Qty: 60 TAB | Refills: 0 | Status: SHIPPED | OUTPATIENT
Start: 2017-08-02 | End: 2017-08-30 | Stop reason: SDUPTHER

## 2017-08-22 DIAGNOSIS — F41.9 ANXIETY: ICD-10-CM

## 2017-08-22 DIAGNOSIS — G47.00 INSOMNIA, UNSPECIFIED TYPE: ICD-10-CM

## 2017-08-23 DIAGNOSIS — F41.9 ANXIETY: Primary | ICD-10-CM

## 2017-08-23 DIAGNOSIS — Z51.81 MEDICATION MONITORING ENCOUNTER: ICD-10-CM

## 2017-08-30 DIAGNOSIS — F41.9 ANXIETY: ICD-10-CM

## 2017-08-30 DIAGNOSIS — G47.00 INSOMNIA, UNSPECIFIED TYPE: ICD-10-CM

## 2017-08-30 RX ORDER — LORAZEPAM 0.5 MG/1
TABLET ORAL
Qty: 60 TAB | Refills: 0 | Status: SHIPPED | OUTPATIENT
Start: 2017-08-30 | End: 2017-10-24 | Stop reason: SDUPTHER

## 2017-09-25 ENCOUNTER — OFFICE VISIT (OUTPATIENT)
Dept: PAIN MANAGEMENT | Age: 62
End: 2017-09-25

## 2017-09-25 VITALS
DIASTOLIC BLOOD PRESSURE: 96 MMHG | SYSTOLIC BLOOD PRESSURE: 183 MMHG | BODY MASS INDEX: 21.63 KG/M2 | TEMPERATURE: 98.2 F | HEART RATE: 63 BPM | WEIGHT: 130 LBS | RESPIRATION RATE: 22 BRPM

## 2017-09-25 DIAGNOSIS — M54.2 NECK PAIN: Primary | ICD-10-CM

## 2017-09-25 DIAGNOSIS — G89.4 CHRONIC PAIN SYNDROME: ICD-10-CM

## 2017-09-25 DIAGNOSIS — Z98.1 STATUS POST CERVICAL SPINAL ARTHRODESIS: ICD-10-CM

## 2017-09-25 DIAGNOSIS — Z79.899 ENCOUNTER FOR LONG-TERM (CURRENT) USE OF HIGH-RISK MEDICATION: ICD-10-CM

## 2017-09-25 DIAGNOSIS — G24.3 CERVICAL DYSTONIA: ICD-10-CM

## 2017-09-25 DIAGNOSIS — M96.1 POSTLAMINECTOMY SYNDROME, CERVICAL REGION: ICD-10-CM

## 2017-09-25 DIAGNOSIS — M50.30 DDD (DEGENERATIVE DISC DISEASE), CERVICAL: ICD-10-CM

## 2017-09-25 DIAGNOSIS — Z98.1 STATUS POST CERVICAL SPINAL FUSION: ICD-10-CM

## 2017-09-25 DIAGNOSIS — Z98.890 S/P CERVICAL DISCECTOMY: ICD-10-CM

## 2017-09-25 LAB
ALCOHOL UR POC: NORMAL
AMPHETAMINES UR POC: NEGATIVE
BARBITURATES UR POC: NEGATIVE
BENZODIAZEPINES UR POC: NEGATIVE
BUPRENORPHINE UR POC: NORMAL
CANNABINOIDS UR POC: NEGATIVE
CARISOPRODOL UR POC: NORMAL
COCAINE UR POC: NEGATIVE
FENTANYL UR POC: NORMAL
MDMA/ECSTASY UR POC: NEGATIVE
METHADONE UR POC: NEGATIVE
METHAMPHETAMINE UR POC: NEGATIVE
METHYLPHENIDATE UR POC: NEGATIVE
OPIATES UR POC: NEGATIVE
OXYCODONE UR POC: NEGATIVE
PHENCYCLIDINE UR POC: NORMAL
PROPOXYPHENE UR POC: NORMAL
TRAMADOL UR POC: NORMAL
TRICYCLICS UR POC: NEGATIVE

## 2017-09-25 RX ORDER — OXYCODONE AND ACETAMINOPHEN 10; 325 MG/1; MG/1
.5-1 TABLET ORAL
Qty: 120 TAB | Refills: 0 | Status: SHIPPED | OUTPATIENT
Start: 2017-11-23 | End: 2017-12-26 | Stop reason: SDUPTHER

## 2017-09-25 RX ORDER — TIZANIDINE 4 MG/1
4 TABLET ORAL
Qty: 120 TAB | Refills: 2 | Status: SHIPPED | OUTPATIENT
Start: 2017-09-25 | End: 2017-11-08 | Stop reason: SDUPTHER

## 2017-09-25 RX ORDER — OXYCODONE AND ACETAMINOPHEN 10; 325 MG/1; MG/1
.5-1 TABLET ORAL
Qty: 120 TAB | Refills: 0 | Status: SHIPPED | OUTPATIENT
Start: 2017-09-25 | End: 2018-01-18 | Stop reason: SDUPTHER

## 2017-09-25 RX ORDER — OXYCODONE AND ACETAMINOPHEN 10; 325 MG/1; MG/1
.5-1 TABLET ORAL
Qty: 120 TAB | Refills: 0 | Status: SHIPPED | OUTPATIENT
Start: 2017-10-24 | End: 2018-01-18 | Stop reason: SDUPTHER

## 2017-09-25 NOTE — PROGRESS NOTES
HISTORY OF PRESENT ILLNESS  Abram Kim is a 58 y.o. female. HPI Comments: Visit survey reviewed  Chief complaint chronic pain syndrome neck pain  Using Percocet 10 mg 4 times a day as needed  Lorazepam is prescribed by different clinic  We are also prescribing the tizanidine 4 mg every 6 hours as needed  Medication helps improve general activity, mood, walking. She was reminded, blood pressure was elevated today. She should follow-up with primary care physician. No new significant side effects reported  Medication continues to help improve quality of life. Medications reviewed including risk and benefits. Recent average level of pain9    Measurement of clinical outcome for chronic pain patient/ SPAASMS Score Card-            Information reviewed and will be scanned. Total score today-11      Review of Systems   Constitutional: Negative for chills and fever. HENT: Negative for ear discharge and ear pain. Eyes: Negative for pain and discharge. Respiratory: Negative for hemoptysis and wheezing. Gastrointestinal: Negative for blood in stool and melena. Genitourinary: Negative for dysuria and flank pain. Musculoskeletal: Positive for back pain and neck pain. Skin: Negative for itching and rash. Neurological: Negative for seizures and loss of consciousness. Psychiatric/Behavioral: Negative for hallucinations, substance abuse and suicidal ideas. Physical Exam   Constitutional: She appears well-developed and well-nourished. She is cooperative. She does not have a sickly appearance. HENT:   Head: Normocephalic and atraumatic. Right Ear: External ear normal. No drainage. Left Ear: External ear normal. No drainage. Nose: Nose normal.   Eyes: Lids are normal. Right eye exhibits no discharge. Left eye exhibits no discharge. Right conjunctiva has no hemorrhage. Left conjunctiva has no hemorrhage. Neck: Neck supple. No tracheal deviation present.  No thyroid mass present. Pulmonary/Chest: Effort normal. No respiratory distress. Neurological: She is alert. No cranial nerve deficit. Skin: Skin is intact. No rash noted. Psychiatric: Her speech is normal. Her affect is not angry. She does not express inappropriate judgment. Nursing note and vitals reviewed. ASSESSMENT and PLAN  Encounter Diagnoses   Name Primary?  Neck pain Yes    Encounter for long-term (current) use of high-risk medication     Postlaminectomy syndrome, cervical region     S/P cervical discectomy     Status post cervical spinal arthrodesis     Chronic pain syndrome     DDD (degenerative disc disease), cervical     Status post cervical spinal fusion     Cervical dystonia    No indicators for recent medication misuse.  reviewed. Pain Meds and Quality Of Life have been reviewed. Nonpharmacologic therapy and non-opioid pharmacologic therapy were considered. If opioid therapy is prescribed, this is only if the expected benefits are anticipated to outweigh risks. Possible changes to treatment plan considered. Support/education given as needed. Today-medications are as listed. No significant changes to medications. Follow up -- 3 months.

## 2017-09-25 NOTE — PROGRESS NOTES
Nursing Notes    Patient presents to the office today in follow-up. Patient rates her pain at 9/10 on the numerical pain scale. Reviewed medications with counts as follows:    Rx Date filled Qty Dispensed Pill Count Last Dose Short   Percocet 10/325mg 08/24/17 120 0 09/22/17 No                                             Comments: b/p elevated; provider aware; education given. POC UDS was performed in office today    Any new labs or imaging since last appointment? NO    Have you been to an emergency room (ER) or urgent care clinic since your last visit? NO            Have you been hospitalized since your last visit? NO     If yes, where, when, and reason for visit? Have you seen or consulted any other health care providers outside of the [address] [place] [address]  since your last visit? NO     If yes, where, when, and reason for visit? HM deferred to pcp.

## 2017-09-26 ENCOUNTER — HOSPITAL ENCOUNTER (OUTPATIENT)
Dept: LAB | Age: 62
Discharge: HOME | End: 2017-09-26
Payer: MEDICARE

## 2017-09-26 DIAGNOSIS — F41.9 ANXIETY: ICD-10-CM

## 2017-09-26 DIAGNOSIS — Z51.81 MEDICATION MONITORING ENCOUNTER: ICD-10-CM

## 2017-09-26 PROCEDURE — 80307 DRUG TEST PRSMV CHEM ANLYZR: CPT | Performed by: FAMILY MEDICINE

## 2017-10-03 LAB — DRUGS UR: NORMAL

## 2017-10-20 ENCOUNTER — PATIENT OUTREACH (OUTPATIENT)
Dept: FAMILY MEDICINE CLINIC | Age: 62
End: 2017-10-20

## 2017-10-20 NOTE — PROGRESS NOTES
S/P [facility] admission on 10/16/17 to 10/19/17 for altered mental status    Contacted patient for follow up. No answer. Left message introducing self, role and reason for call. Requested return call. Contact information provided. Awaiting return phone call from patient.

## 2017-10-23 NOTE — PROGRESS NOTES
10/23/17 @ 12:00 pm    Made second attempt to contact patient for post hospital discharge follow up. No answer. Left message introducing self, role and reason for call. Requested return call. Contact information provided. Awaiting response from patient.

## 2017-10-24 ENCOUNTER — PATIENT OUTREACH (OUTPATIENT)
Dept: FAMILY MEDICINE CLINIC | Age: 62
End: 2017-10-24

## 2017-10-24 DIAGNOSIS — F41.9 ANXIETY: ICD-10-CM

## 2017-10-24 DIAGNOSIS — G47.00 INSOMNIA, UNSPECIFIED TYPE: ICD-10-CM

## 2017-10-24 RX ORDER — LORAZEPAM 0.5 MG/1
TABLET ORAL
Qty: 60 TAB | Refills: 0 | Status: SHIPPED | OUTPATIENT
Start: 2017-10-24 | End: 2017-12-14 | Stop reason: SDUPTHER

## 2017-10-24 NOTE — PROGRESS NOTES
S/P [facility] admission on 10/16/17 to 10/19/17 for altered mental status    No response obtained from phone calls placed on 10/20/17 and 10/23/17.  Patient also re-scheduled MAUREEN follow-up appointment from 10/24/17 to 10/25/17 @ 10:15 am.

## 2017-10-25 ENCOUNTER — OFFICE VISIT (OUTPATIENT)
Dept: FAMILY MEDICINE CLINIC | Age: 62
End: 2017-10-25

## 2017-10-25 VITALS
HEIGHT: 65 IN | HEART RATE: 67 BPM | RESPIRATION RATE: 15 BRPM | DIASTOLIC BLOOD PRESSURE: 64 MMHG | TEMPERATURE: 97.2 F | SYSTOLIC BLOOD PRESSURE: 110 MMHG | WEIGHT: 124 LBS | BODY MASS INDEX: 20.66 KG/M2 | OXYGEN SATURATION: 97 %

## 2017-10-25 DIAGNOSIS — Z09 HOSPITAL DISCHARGE FOLLOW-UP: ICD-10-CM

## 2017-10-25 DIAGNOSIS — G47.00 INSOMNIA, UNSPECIFIED TYPE: ICD-10-CM

## 2017-10-25 DIAGNOSIS — R56.9 SEIZURE-LIKE ACTIVITY (HCC): Primary | ICD-10-CM

## 2017-10-25 RX ORDER — TRAZODONE HYDROCHLORIDE 50 MG/1
50 TABLET ORAL
Qty: 10 TAB | Refills: 0 | Status: SHIPPED | OUTPATIENT
Start: 2017-10-25 | End: 2017-12-15 | Stop reason: SDUPTHER

## 2017-10-25 NOTE — PROGRESS NOTES
HPI  Sabine Guevara is a 58 y.o. female  Chief Complaint   Patient presents with   [facility] Follow Up     Pt was D/C from [facility] 10/19/17. Pt was seen for seizure like activity and altered mental status. Patient admitted to the hospital from 10/16-10/19  Patient Outreach call by Kellee Montana RN on 10/20/17 and on 10/23 with unsuccessful attempts. Denies seizures since being home. Denies LOC. Reports ongoing dizziness since hospital admission. Reports she has only had a few episodes since being home. Denies dizziness currently. Reports a fall on the day she was admitted to the hospital. Denies falling since being home. Reports one episode of vomiting since being home. Reports her vomiting and nausea is related to gastric and esophogeal problems. Denies chest pain. However admits that she thought esophogeal discomfort was coming from her heart and that is why she went to the ER. Reports being on omeprazole which is effective. Reports she has a neurologist, gastrologist, and cardiologist.   Scott Carcamo when her next appointment with her neurologist.  Reports she needs her sleep medication as she is not sleeping at night. Reports she does not need it every night but reports she needs it 2-3 times a week. Reports 5 hours of sleep last night as she tossed and turned prior to going to sleep.    Past Medical History  Past Medical History:   Diagnosis Date    Anxiety disorder     Arthritis     Cervical dystonia 9/5/2014    Cervical pain     Cervicogenic headache 9/5/2014    Chronic pain     knee    DDD (degenerative disc disease), cervical 9/5/2014    Essential hypertension     Fibrillation, atrial (HCC)     Fibromyalgia     GERD (gastroesophageal reflux disease)     Headache(784.0)     Hepatic steatosis     Herniated disc, cervical     Hypercholesterolemia     Hypertension     Joint pain     JRA [facility]juvenile rheumatoid arthritis) ([facility])     Muscle pain     Musculoskeletal pain 9/5/2014    Numbness and tingling of left arm and leg     Status post cervical spinal fusion 9/5/2014    Thyroid disease     Vision decreased        Surgical History  Past Surgical History:   Procedure Laterality Date    COLONOSCOPY N/A 4/18/2017    COLONOSCOPY, DIAGNOSTIC [facility] [address] [facility] [address]  5/[facility]13    C3-4 ACDF     HX GASTRIC BYPASS  02/03/10    HX LUMBAR LAMINECTOMY      HX ORTHOPAEDIC  2013    Spinal SurgeryC-2 AND C-3    HX OTHER SURGICAL      GIST tumor resected    HX OTHER SURGICAL      teeth extractions    HX PARTIAL HYSTERECTOMY  1988    MN EXCISION TUMOR SOFT TISS FACE/SCALP SUBQ < 2CM N/A 09/30/2016    Dr. Villela Heading        Medications  Current Outpatient Prescriptions   Medication Sig Dispense Refill    traZODone (DESYREL) 50 mg tablet Take 1 Tab by mouth nightly as needed for Sleep. 10 Tab 0    LORazepam (ATIVAN) 0.5 mg tablet take 1 tablet by mouth twice a day if needed 60 Tab 0    tiZANidine (ZANAFLEX) 4 mg tablet Take 1 Tab by mouth every six (6) hours as needed for Pain (spasm) for up to 30 days. 120 Tab 2    oxyCODONE-acetaminophen (PERCOCET 10)  mg per tablet Take 0.5-1 Tabs by mouth every six (6) hours as needed for Pain for up to 30 days. Max Daily Amount: 4 Tabs. 120 Tab 0    [START ON 11/23/2017] oxyCODONE-acetaminophen (PERCOCET 10)  mg per tablet Take 0.5-1 Tabs by mouth every six (6) hours as needed for Pain for up to 30 days. Max Daily Amount: 4 Tabs. 120 Tab 0    oxyCODONE-acetaminophen (PERCOCET 10)  mg per tablet Take 0.5-1 Tabs by mouth every six (6) hours as needed for Pain for up to 30 days. Max Daily Amount: 4 Tabs. 120 Tab 0    omeprazole (PRILOSEC) 40 mg capsule Take 1 Cap by mouth two (2) times a day. 60 Cap 5    promethazine (PHENERGAN) 25 mg tablet Take 1 Tab by mouth every six (6) hours as needed for Nausea. 60 Tab 0    hydroCHLOROthiazide (HYDRODIURIL) 25 mg tablet Take 25 mg by mouth daily.   0    aspirin delayed-release 81 mg tablet Take  by mouth daily.  levothyroxine (SYNTHROID) 75 mcg tablet Take 1 Tab by mouth Daily (before breakfast). 30 Tab 5    CYANOCOBALAMIN, VITAMIN B-12, (VITAMIN B-12 PO) Take 1 tablet by mouth as needed.          Allergies  Allergies   Allergen Reactions    Codeine Nausea and Vomiting    Pcn [Penicillins] Not Reported This Time and Hives    Tramadol Palpitations    Vicodin [Hydrocodone-Acetaminophen] Itching and Hives       Family History  Family History   Problem Relation Age of Onset    Hypertension Mother     Heart Failure Mother     Hypertension Father     Arthritis-osteo Father        Social History  Social History     Social History    Marital status:      Spouse name: N/A    Number of children: N/A    Years of education: N/A     Occupational History    disabled Disability     Social History Main Topics    Smoking status: Former Smoker     Types: Cigarettes     Quit date: 12/31/1989    Smokeless tobacco: Never Used    Alcohol use No    Drug use: Yes     Special: Prescription, OTC    Sexual activity: Not on file     Other Topics Concern    Not on file     Social History Narrative       Problem List  Patient Active Problem List   Diagnosis Code    S/P gastric bypass - date of surgery 02/10 Z98.84    Cervical disc disease with myelopathy M50.00    S/P cervical discectomy Z98.890    Status post cervical spinal arthrodesis Z98.1    Chronic pain syndrome G89.4    Postlaminectomy syndrome, cervical region M96.1    Myalgia and myositis, unspecified GIS1017    Encounter for postoperative care Z48.89    Thyroid disease E07.9    Essential hypertension I10    Hypercholesterolemia E78.00    Musculoskeletal pain M79.1    DDD (degenerative disc disease), cervical M50.30    Status post cervical spinal fusion Z98.1    Cervicogenic headache R51    Cervical dystonia G24.3    Neck pain M54.2    Esophageal dysfunction K22.4    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    Supraventricular tachycardia (HCC) I47.1    Gastroesophageal reflux disease with esophagitis K21.0    Anxiety F41.9    Weakness generalized R53.1       Review of Systems  Review of Systems   Constitutional: Negative for chills and fever. Respiratory: Negative for shortness of breath and wheezing. Cardiovascular: Negative for chest pain and palpitations. Gastrointestinal: Positive for heartburn, nausea and vomiting. Musculoskeletal: Positive for falls. Neurological: Positive for dizziness. Negative for seizures and loss of consciousness. Psychiatric/Behavioral: Negative for depression and suicidal ideas. Vital Signs  Vitals:    10/25/17 1020 10/25/17 1107   BP: 93/59 110/64   Pulse: 67    Resp: 15    Temp: 97.2 °F (36.2 °C)    TempSrc: Oral    SpO2: 97%    Weight: 124 lb (56.2 kg)    Height: 5' 5\" (1.651 m)    PainSc:   0 - No pain    LMP: 12/30/1988       Physical Exam  Physical Exam   Constitutional: She is oriented to person, place, and time. HENT:   Mouth/Throat: Oropharynx is clear and moist.   Cardiovascular: Normal rate, regular rhythm and normal heart sounds. No murmur heard. Pulmonary/Chest: Effort normal and breath sounds normal. No respiratory distress. She has no wheezes. Neurological: She is alert and oriented to person, place, and time. Coordination normal.   Skin: Skin is warm and dry. Psychiatric: She has a normal mood and affect. Vitals reviewed. Diagnostics  Orders Placed This Encounter    traZODone (DESYREL) 50 mg tablet     Sig: Take 1 Tab by mouth nightly as needed for Sleep. Dispense:  10 Tab     Refill:  0       Results  Results for orders placed or performed during the hospital encounter of 09/26/17   COMPLIANCE DRUG SCREEN/PRESCRIPTION MONITORING   Result Value Ref Range    Summary FINAL          Assessment and Plan  Diagnoses and all orders for this visit:    1. Seizure-like activity ([address]    2.  Hospital discharge follow-up    3. Insomnia, unspecified type  -     traZODone (DESYREL) 50 mg tablet; Take 1 Tab by mouth nightly as needed for Sleep. Discussed side effects of trazodone and importance of taking this only as needed and at night when going to bed as it can contribute to dizziness. Patient agrees to make a follow up appointment with neurologist, cardiologist, and gastro. After care summary printed and reviewed with patient. Plan reviewed with patient. Questions answered. Patient verbalized understanding of plan and is in agreement with plan. Patient to follow up in two weeks with PCP or earlier if symptoms worsen.      CHYNA Madison-C

## 2017-10-25 NOTE — MR AVS SNAPSHOT
Visit Information Date & Time Provider Department Dept. Phone Encounter #  
 10/25/2017 10:15 AM Bela Diaz NP [address] [id number] Follow-up Instructions Return in about 2 weeks (around 11/8/2017), or if symptoms worsen or fail to improve. Upcoming Health Maintenance Date Due DTaP/Tdap/Td series (1 - Tdap) 1/29/1976 ZOSTER VACCINE AGE 60> 11/29/2014 INFLUENZA AGE 9 TO ADULT 8/1/2017 PAP AKA CERVICAL CYTOLOGY 9/30/2017 BREAST CANCER SCRN MAMMOGRAM 11/2/2017 COLONOSCOPY 4/18/2022 Allergies as of 10/25/2017  Review Complete On: 10/25/2017 By: Bela Diaz NP Severity Noted Reaction Type Reactions Codeine  12/23/2013   Intolerance Nausea and Vomiting Pcn [Penicillins]    Not Reported This Time, Hives Tramadol  12/23/2013    Palpitations Vicodin [Hydrocodone-acetaminophen]  12/23/2013    Itching, Hives Current Immunizations  Reviewed on 6/25/2014 No immunizations on file. Not reviewed this visit You Were Diagnosed With   
  
 Codes Comments Se[facility]    -  Primary ICD-10-CM: R56.9 ICD-9-CM: 780.39 Hospital discharge follow-up     ICD-10-CM: [facility] ICD-9-CM: V67.59 Insomnia, unspecified type     ICD-10-CM: G47.00 ICD-9-CM: 780.52 Vitals BP Pulse Temp Resp Height(growth percentile) Weight(growth percentile) 93/59 (BP 1 Location: Left arm, BP Patient Position: Sitting) 67 97.2 °F (36.2 °C) (Oral) 15 5' 5\" (1.651 m) 124 lb (56.2 kg) LMP SpO2 BMI OB Status Smoking Status 12/30/1988 97% 20.63 kg/m2 Hysterectomy Former Smoker BMI and BSA Data Body Mass Index Body Surface Area  
 20.63 kg/m 2 1.61 m 2 Preferred Pharmacy Pharmacy Name Phone [address] [facility], [address] Your Updated Medication List  
  
   
 This list is accurate as of: 10/25/17 11:02 AM.  Always use your most recent med list.  
  
  
  
  
 aspirin delayed-release 81 mg tablet Take  by mouth daily. hydroCHLOROthiazide 25 mg tablet Commonly known as:  HYDRODIURIL Take 25 mg by mouth daily. levothyroxine 75 mcg tablet Commonly known as:  SYNTHROID Take 1 Tab by mouth Daily (before breakfast). LORazepam 0.5 mg tablet Commonly known as:  ATIVAN  
take 1 tablet by mouth twice a day if needed  
  
 omeprazole 40 mg capsule Commonly known as:  PRILOSEC Take 1 Cap by mouth two (2) times a day. * oxyCODONE-acetaminophen  mg per tablet Commonly known as:  PERCOCET 10 Take 0.5-1 Tabs by mouth every six (6) hours as needed for Pain for up to 30 days. Max Daily Amount: 4 Tabs. * oxyCODONE-acetaminophen  mg per tablet Commonly known as:  PERCOCET 10 Take 0.5-1 Tabs by mouth every six (6) hours as needed for Pain for up to 30 days. Max Daily Amount: 4 Tabs. * oxyCODONE-acetaminophen  mg per tablet Commonly known as:  PERCOCET 10 Take 0.5-1 Tabs by mouth every six (6) hours as needed for Pain for up to 30 days. Max Daily Amount: 4 Tabs. Start taking on:  11/23/2017  
  
 promethazine 25 mg tablet Commonly known as:  PHENERGAN Take 1 Tab by mouth every six (6) hours as needed for Nausea. tiZANidine 4 mg tablet Commonly known as:  Tan Garcia Take 1 Tab by mouth every six (6) hours as needed for Pain (spasm) for up to 30 days. traZODone 50 mg tablet Commonly known as:  Tawana Ross Take 1 Tab by mouth nightly as needed for Sleep. VITAMIN B-12 PO Take 1 tablet by mouth as needed. * Notice: This list has 3 medication(s) that are the same as other medications prescribed for you. Read the directions carefully, and ask your doctor or other care provider to review them with you. Prescriptions Sent to Pharmacy Refills traZODone (DESYREL) 50 mg tablet 0 Sig: Take 1 Tab by mouth nightly as needed for Sleep. Class: Normal  
 Pharmacy: [address] [facility], [address] [facility] #: [phone] Route: Oral  
  
Follow-up Instructions Return in about 2 weeks (around 11/8/2017), or if symptoms worsen or fail to improve. Patient Instructions Insomnia: Care Instructions Your Care Instructions Insomnia is the inability to sleep well. It is a common problem for most people at some time. Insomnia may make it hard for you to get to sleep, stay asleep, or sleep as long as you need to. This can make you tired and grouchy during the day. It can also make you forgetful, less effective at work, and unhappy. Insomnia can be caused by conditions such as depression or anxiety. Pain can also affect your ability to sleep. When these problems are solved, the insomnia usually clears up. But sometimes bad sleep habits can cause insomnia. If insomnia is affecting your work or your enjoyment of life, you can take steps to improve your sleep. Follow-up care is a key part of your treatment and safety. Be sure to make and go to all appointments, and call your doctor if you are having problems. It's also a good idea to know your test results and keep a list of the medicines you take. How can you care for yourself at home? What to avoid · Do not have drinks with caffeine, such as coffee or black tea, for 8 hours before bed. · Do not smoke or use other types of tobacco near bedtime. Nicotine is a stimulant and can keep you awake. · Avoid drinking alcohol late in the evening, because it can cause you to wake in the middle of the night. · Do not eat a big meal close to bedtime. If you are hungry, eat a light snack. · Do not drink a lot of water close to bedtime, because the need to urinate may wake you up during the night. · Do not read or watch TV in bed.  Use the bed only for sleeping and sexual activity. What to try · Go to bed at the same time every night, and wake up at the same time every morning. Do not take naps during the day. · Keep your bedroom quiet, dark, and cool. · Sleep on a comfortable pillow and mattress. · If watching the clock makes you anxious, turn it facing away from you so you cannot see the time. · If you worry when you lie down, start a worry book. Well before bedtime, write down your worries, and then set the book and your concerns aside. · Try meditation or other relaxation techniques before you go to bed. · If you cannot fall asleep, get up and go to another room until you feel sleepy. Do something relaxing. Repeat your bedtime routine before you go to bed again. · Make your house quiet and calm about an hour before bedtime. Turn down the lights, turn off the TV, log off the computer, and turn down the volume on music. This can help you relax after a busy day. When should you call for help? Watch closely for changes in your health, and be sure to contact your doctor if: 
· Your efforts to improve your sleep do not work. · Your insomnia gets worse. · You have been feeling down, depressed, or hopeless or have lost interest in things that you usually enjoy. Where can you learn more? Go to http://andrei-juice.info/. Enter P513 in the search box to learn more about \"Insomnia: Care Instructions. \" Current as of: July 26, 2016 Content Version: 11.3 © 6524-1223 [employer]. Care instructions adapted under license by [employer] (which disclaims liability or warranty for this information). If you have questions about a medical condition or this instruction, always ask your healthcare professional. [place] [address] any warranty or liability for your use of this information. Trazodone (Desyrel, Desyrel Dividose, Oleptro, Trazamine) - (By mouth) Why this medicine is used:  
Treats depression. Contact a nurse or doctor right away if you have: 
· Fast, pounding, or uneven heartbeat; lightheadedness or fainting · Thoughts of hurting yourself or others, unusual behavior · Anxiety, restlessness, muscle spasms, fever, sweating, vomiting, diarrhea · Seeing or hearing things that are not there · Painful, prolonged erection of your penis (men) Common side effects: · Blurred vision · Headache, dizziness, drowsiness · Constipation, nausea, dry mouth © 2017 [address]  Information is for End User's use only and may not be sold, redistributed or otherwise used for commercial purposes. Trazodone (By mouth) Trazodone (TRAZ-oh-done) Treats depression. Brand Name(s):  
There may be other brand names for this medicine. When This Medicine Should Not Be Used: This medicine is not right for everyone. Do not use it if you had an allergic reaction to trazodone. How to Use This Medicine:  
Tablet, Long Acting Tablet · Take your medicine as directed. Your dose may need to be changed several times to find what works best for you. · Regular tablet: Take it with or shortly after a meal or light snack. · Extended-release tablet: Take it at the same time each day, preferably at bedtime, without food. · The tablet can be swallowed whole, or you may break the tablet in half along the score line. Do not break the tablet unless your doctor tells you to. Do not crush or chew the tablet. · This medicine should come with a Medication Guide. Ask your pharmacist for a copy if you do not have one. · Missed dose: Take a dose as soon as you remember. If it is almost time for your next dose, wait until then and take a regular dose. Do not take extra medicine to make up for a missed dose. · Store the medicine in a closed container at room temperature, away from heat, moisture, and direct light. Drugs and Foods to Avoid: Ask your doctor or pharmacist before using any other medicine, including over-the-counter medicines, vitamins, and herbal products. · Do not use trazodone if you currently take an MAO inhibitor (MAOI) or have used an MAOI in the past 14 days. · Tell your doctor if you also use any of the following: ¨ Carbamazepine, digoxin, phenytoin, indinavir, ritonavir, buspirone, fentanyl, lithium, tryptophan, Margarito's wort, tramadol ¨ Medicine to treat a fungal infection (such as itraconazole, ketoconazole), a diuretic (water pill), blood pressure medicine, an NSAID pain or arthritis medicine (such as aspirin, celecoxib, diclofenac, ibuprofen, naproxen), a blood thinner (such as warfarin), other medicine for depression, or triptan medicine to treat migraine headaches · Do not drink alcohol while you are using this medicine. · Tell your doctor if you use anything else that makes you sleepy. Some examples are allergy medicine, narcotic pain medicine, and alcohol. Warnings While Using This Medicine: · Tell your doctor if you are pregnant or breastfeeding, or if you have kidney disease, liver disease, bleeding problems, glaucoma, heart disease, heart rhythm problems, or low blood pressure. Tell your doctor if you recently had a heart attack. · For some children, teenagers, and young adults, this medicine may increase mental or emotional problems. This may lead to thoughts of suicide and violence. Talk with your doctor right away if you have any thoughts or behavior changes that concern you. Tell your doctor if you or anyone in your family has a history of bipolar disorder or suicide attempts. · This medicine may cause the following problems: 
¨ Serotonin syndrome (more likely when used with certain other medicines) ¨ Heart rhythm problems (QT prolongation) ¨ Low sodium levels ¨ Higher risk of bleeding · Do not stop using this medicine suddenly. Your doctor will need to slowly decrease your dose before you stop it completely. · This medicine may make you dizzy or drowsy. Do not drive or do anything that could be dangerous until you know how this medicine affects you. Stand or sit up slowly if you are dizzy. · Tell any doctor or dentist who treats you that you are using this medicine. You may need to stop using this medicine several days before you have surgery or medical tests. · Your doctor will check your progress and the effects of this medicine at regular visits. Keep all appointments. · Keep all medicine out of the reach of children. Never share your medicine with anyone. Possible Side Effects While Using This Medicine:  
Call your doctor right away if you notice any of these side effects: · Allergic reaction: Itching or hives, swelling in your face or hands, swelling or tingling in your mouth or throat, chest tightness, trouble breathing · Anxiety, restlessness, fever, sweating, muscle spasms, nausea, vomiting, diarrhea, seeing or hearing things that are not there · Confusion, weakness, muscle twitching · Fast, pounding, or uneven heartbeat · Lightheadedness, dizziness, fainting · Painful, prolonged erection of your penis · Sudden increase in energy, feeling irritable, trouble sleeping · Thoughts of hurting yourself or others, unusual behavior · Unusual bleeding or bruising If you notice these less serious side effects, talk with your doctor: · Constipation, mild nausea · Dry mouth · Eye pain, vision changes, seeing halos around lights · Headache · Sleepiness or unusual drowsiness If you notice other side effects that you think are caused by this medicine, tell your doctor. Call your doctor for medical advice about side effects. You may report side effects to FDA at 0-706-FDA-6484 © 2017 [address] Information is for End User's use only and may not be sold, redistributed or otherwise used for commercial purposes. The above information is an  only.  It is not intended as medical advice for individual conditions or treatments. Talk to your doctor, nurse or pharmacist before following any medical regimen to see if it is safe and effective for you. Introducing [facility]! Lenny Chavez introduces [employer] patient portal. Now you can access parts of your medical record, email your doctor's office, and request medication refills online. 1. In your internet browser, go to [email or web address] 2. Click on the First Time User? Click Here link in the Sign In box. You will see the New Member Sign Up page. 3. Enter your [employer] Access Code exactly as it appears below. You will not need to use this code after youve completed the sign-up process. If you do not sign up before the expiration date, you must request a new code. · [employer] Access Code: [email or web address] Expires: 12/24/2017  3:24 PM 
 
4. Enter the last four digits of your Social Security Number (xxxx) and Date of Birth (mm/dd/yyyy) as indicated and click Submit. You will be taken to the next sign-up page. 5. Create a [employer] ID. This will be your [employer] login ID and cannot be changed, so think of one that is secure and easy to remember. 6. Create a [employer] password. You can change your password at any time. 7. Enter your Password Reset Question and Answer. This can be used at a later time if you forget your password. 8. Enter your e-mail address. You will receive e-mail notification when new information is available in [address] 9. Click Sign Up. You can now view and download portions of your medical record. 10. Click the Download Summary menu link to download a portable copy of your medical information. If you have questions, please visit the Frequently Asked Questions section of the [employer] website. Remember, [employer] is NOT to be used for urgent needs. For medical emergencies, dial 911. Now available from your iPhone and Android! Please provide this summary of care documentation to your next provider. Your primary care clinician is listed as Bassem Dunn. If you have any questions after today's visit, please call [phone].

## 2017-10-25 NOTE — PATIENT INSTRUCTIONS
Insomnia: Care Instructions  Your Care Instructions  Insomnia is the inability to sleep well. It is a common problem for most people at some time. Insomnia may make it hard for you to get to sleep, stay asleep, or sleep as long as you need to. This can make you tired and grouchy during the day. It can also make you forgetful, less effective at work, and unhappy. Insomnia can be caused by conditions such as depression or anxiety. Pain can also affect your ability to sleep. When these problems are solved, the insomnia usually clears up. But sometimes bad sleep habits can cause insomnia. If insomnia is affecting your work or your enjoyment of life, you can take steps to improve your sleep. Follow-up care is a key part of your treatment and safety. Be sure to make and go to all appointments, and call your doctor if you are having problems. It's also a good idea to know your test results and keep a list of the medicines you take. How can you care for yourself at home? What to avoid  · Do not have drinks with caffeine, such as coffee or black tea, for 8 hours before bed. · Do not smoke or use other types of tobacco near bedtime. Nicotine is a stimulant and can keep you awake. · Avoid drinking alcohol late in the evening, because it can cause you to wake in the middle of the night. · Do not eat a big meal close to bedtime. If you are hungry, eat a light snack. · Do not drink a lot of water close to bedtime, because the need to urinate may wake you up during the night. · Do not read or watch TV in bed. Use the bed only for sleeping and sexual activity. What to try  · Go to bed at the same time every night, and wake up at the same time every morning. Do not take naps during the day. · Keep your bedroom quiet, dark, and cool. · Sleep on a comfortable pillow and mattress. · If watching the clock makes you anxious, turn it facing away from you so you cannot see the time.   · If you worry when you lie down, start a worry book. Well before bedtime, write down your worries, and then set the book and your concerns aside. · Try meditation or other relaxation techniques before you go to bed. · If you cannot fall asleep, get up and go to another room until you feel sleepy. Do something relaxing. Repeat your bedtime routine before you go to bed again. · Make your house quiet and calm about an hour before bedtime. Turn down the lights, turn off the TV, log off the computer, and turn down the volume on music. This can help you relax after a busy day. When should you call for help? Watch closely for changes in your health, and be sure to contact your doctor if:  · Your efforts to improve your sleep do not work. · Your insomnia gets worse. · You have been feeling down, depressed, or hopeless or have lost interest in things that you usually enjoy. Where can you learn more? Go to http://andreiGazeHawkjuice.info/. Enter P513 in the search box to learn more about \"Insomnia: Care Instructions. \"  Current as of: July 26, 2016  Content Version: 11.3  © 6898-2696 [employer]. Care instructions adapted under license by [employer] (which disclaims liability or warranty for this information). If you have questions about a medical condition or this instruction, always ask your healthcare professional. [address] any warranty or liability for your use of this information. Trazodone (Desyrel, Desyrel Dividose, Oleptro, Trazamine) - (By mouth)   Why this medicine is used:   Treats depression.   Contact a nurse or doctor right away if you have:  · Fast, pounding, or uneven heartbeat; lightheadedness or fainting  · Thoughts of hurting yourself or others, unusual behavior  · Anxiety, restlessness, muscle spasms, fever, sweating, vomiting, diarrhea  · Seeing or hearing things that are not there  · Painful, prolonged erection of your penis (men)     Common side effects:  · Blurred vision  · Headache, dizziness, drowsiness  · Constipation, nausea, dry mouth  © 2017 [facility] Information is for End User's use only and may not be sold, redistributed or otherwise used for commercial purposes. Trazodone (By mouth)   Trazodone (TRAZ-oh-done)  Treats depression. Brand Name(s):   There may be other brand names for this medicine. When This Medicine Should Not Be Used: This medicine is not right for everyone. Do not use it if you had an allergic reaction to trazodone. How to Use This Medicine:   Tablet, Long Acting Tablet  · Take your medicine as directed. Your dose may need to be changed several times to find what works best for you. · Regular tablet: Take it with or shortly after a meal or light snack. · Extended-release tablet: Take it at the same time each day, preferably at bedtime, without food. · The tablet can be swallowed whole, or you may break the tablet in half along the score line. Do not break the tablet unless your doctor tells you to. Do not crush or chew the tablet. · This medicine should come with a Medication Guide. Ask your pharmacist for a copy if you do not have one. · Missed dose: Take a dose as soon as you remember. If it is almost time for your next dose, wait until then and take a regular dose. Do not take extra medicine to make up for a missed dose. · Store the medicine in a closed container at room temperature, away from heat, moisture, and direct light. Drugs and Foods to Avoid:   Ask your doctor or pharmacist before using any other medicine, including over-the-counter medicines, vitamins, and herbal products. · Do not use trazodone if you currently take an MAO inhibitor (MAOI) or have used an MAOI in the past 14 days.   · Tell your doctor if you also use any of the following:  ¨ Carbamazepine, digoxin, phenytoin, indinavir, ritonavir, buspirone, fentanyl, lithium, tryptophan, Margarito's wort, tramadol  ¨ Medicine to treat a fungal infection (such as itraconazole, ketoconazole), a diuretic (water pill), blood pressure medicine, an NSAID pain or arthritis medicine (such as aspirin, celecoxib, diclofenac, ibuprofen, naproxen), a blood thinner (such as warfarin), other medicine for depression, or triptan medicine to treat migraine headaches  · Do not drink alcohol while you are using this medicine. · Tell your doctor if you use anything else that makes you sleepy. Some examples are allergy medicine, narcotic pain medicine, and alcohol. Warnings While Using This Medicine:   · Tell your doctor if you are pregnant or breastfeeding, or if you have kidney disease, liver disease, bleeding problems, glaucoma, heart disease, heart rhythm problems, or low blood pressure. Tell your doctor if you recently had a heart attack. · For some children, teenagers, and young adults, this medicine may increase mental or emotional problems. This may lead to thoughts of suicide and violence. Talk with your doctor right away if you have any thoughts or behavior changes that concern you. Tell your doctor if you or anyone in your family has a history of bipolar disorder or suicide attempts. · This medicine may cause the following problems:  ¨ Serotonin syndrome (more likely when used with certain other medicines)  ¨ Heart rhythm problems (QT prolongation)  ¨ Low sodium levels  ¨ Higher risk of bleeding  · Do not stop using this medicine suddenly. Your doctor will need to slowly decrease your dose before you stop it completely. · This medicine may make you dizzy or drowsy. Do not drive or do anything that could be dangerous until you know how this medicine affects you. Stand or sit up slowly if you are dizzy. · Tell any doctor or dentist who treats you that you are using this medicine. You may need to stop using this medicine several days before you have surgery or medical tests.   · Your doctor will check your progress and the effects of this medicine at regular visits. Keep all appointments. · Keep all medicine out of the reach of children. Never share your medicine with anyone. Possible Side Effects While Using This Medicine:   Call your doctor right away if you notice any of these side effects:  · Allergic reaction: Itching or hives, swelling in your face or hands, swelling or tingling in your mouth or throat, chest tightness, trouble breathing  · Anxiety, restlessness, fever, sweating, muscle spasms, nausea, vomiting, diarrhea, seeing or hearing things that are not there  · Confusion, weakness, muscle twitching  · Fast, pounding, or uneven heartbeat  · Lightheadedness, dizziness, fainting  · Painful, prolonged erection of your penis  · Sudden increase in energy, feeling irritable, trouble sleeping  · Thoughts of hurting yourself or others, unusual behavior  · Unusual bleeding or bruising  If you notice these less serious side effects, talk with your doctor:   · Constipation, mild nausea  · Dry mouth  · Eye pain, vision changes, seeing halos around lights  · Headache  · Sleepiness or unusual drowsiness  If you notice other side effects that you think are caused by this medicine, tell your doctor. Call your doctor for medical advice about side effects. You may report side effects to FDA at 6-348-FDA-7175  © 2017 [facility] Information is for End User's use only and may not be sold, redistributed or otherwise used for commercial purposes. The above information is an  only. It is not intended as medical advice for individual conditions or treatments. Talk to your doctor, nurse or pharmacist before following any medical regimen to see if it is safe and effective for you.

## 2017-10-25 NOTE — PROGRESS NOTES
1. Have you been to the ER, urgent care clinic since your last visit? Hospitalized since your last visit? Yes refer to care everywhere    2. Have you seen or consulted any other health care providers outside of the [address] [place] [address] since your last visit? Include any pap smears or colon screening. No    Is someone accompanying this pt? no    Is the patient using any DME equipment during OV? no      Chief Complaint   Patient presents with   [facility][1] Follow Up     Pt was D/C from [facility] 10/19/17. Pt was seen for seizure like activity and altered mental status.

## 2017-11-01 ENCOUNTER — PATIENT OUTREACH (OUTPATIENT)
Dept: FAMILY MEDICINE CLINIC | Age: 62
End: 2017-11-01

## 2017-11-01 NOTE — PROGRESS NOTES
S/P [facility] admission on 10/16/17 to 10/19/17 for altered mental status    Contacted patient for follow-up. Unidentified recipient picked up and then hang call up. Patient was unresponsive to prior phone calls for post hospital discharge follow-up. Patient kept MAUREEN follow-up appointment on 10/25/17 and next follow-up appointment is schedule with Dr. Randy Jesus for 11/8/17.

## 2017-11-08 ENCOUNTER — OFFICE VISIT (OUTPATIENT)
Dept: FAMILY MEDICINE CLINIC | Age: 62
End: 2017-11-08

## 2017-11-08 VITALS
HEIGHT: 65 IN | DIASTOLIC BLOOD PRESSURE: 82 MMHG | OXYGEN SATURATION: 100 % | RESPIRATION RATE: 14 BRPM | SYSTOLIC BLOOD PRESSURE: 146 MMHG | TEMPERATURE: 97.5 F | HEART RATE: 92 BPM | WEIGHT: 129 LBS | BODY MASS INDEX: 21.49 KG/M2

## 2017-11-08 DIAGNOSIS — M79.18 MUSCULOSKELETAL PAIN: ICD-10-CM

## 2017-11-08 DIAGNOSIS — R11.2 NAUSEA AND VOMITING, INTRACTABILITY OF VOMITING NOT SPECIFIED, UNSPECIFIED VOMITING TYPE: ICD-10-CM

## 2017-11-08 DIAGNOSIS — R55 SYNCOPE, UNSPECIFIED SYNCOPE TYPE: Primary | ICD-10-CM

## 2017-11-08 DIAGNOSIS — Z51.81 MEDICATION MONITORING ENCOUNTER: ICD-10-CM

## 2017-11-08 RX ORDER — TIZANIDINE 4 MG/1
4 TABLET ORAL
Qty: 40 TAB | Refills: 0 | Status: SHIPPED | OUTPATIENT
Start: 2017-11-08 | End: 2018-01-18 | Stop reason: SDUPTHER

## 2017-11-08 RX ORDER — PROMETHAZINE HYDROCHLORIDE 25 MG/1
25 TABLET ORAL
Qty: 60 TAB | Refills: 0 | Status: SHIPPED | OUTPATIENT
Start: 2017-11-08 | End: 2018-05-16 | Stop reason: SDUPTHER

## 2017-11-08 RX ORDER — NALOXONE HYDROCHLORIDE 1 MG/ML
1 INJECTION INTRAMUSCULAR; INTRAVENOUS; SUBCUTANEOUS
Qty: 1 SYRINGE | Refills: 0 | Status: SHIPPED | OUTPATIENT
Start: 2017-11-08

## 2017-11-08 NOTE — MR AVS SNAPSHOT
Visit Information Date & Time Provider Department Dept. Phone Encounter #  
 11/8/2017 10:00 AM Pravin Lambert MD [address] [id number] Upcoming Health Maintenance Date Due DTaP/Tdap/Td series (1 - Tdap) 1/29/1976 PAP AKA CERVICAL CYTOLOGY 9/30/2017 BREAST CANCER SCRN MAMMOGRAM 11/2/2017 COLONOSCOPY 4/18/2022 Allergies as of 11/8/2017  Review Complete On: 11/8/2017 By: Pravin Lambert MD  
  
 Severity Noted Reaction Type Reactions Codeine  12/23/2013   Intolerance Nausea and Vomiting Pcn [Penicillins]    Not Reported This Time, Hives Tramadol  12/23/2013    Palpitations Vicodin [Hydrocodone-acetaminophen]  12/23/2013    Itching, Hives Current Immunizations  Reviewed on 6/25/2014 No immunizations on file. Not reviewed this visit You Were Diagnosed With   
  
 Codes Comments Syncope, unspecified syncope type    -  Primary ICD-10-CM: R55 
ICD-9-CM: 780.2 Nausea and vomiting, intractability of vomiting not specified, unspecified vomiting type     ICD-10-CM: R11.2 ICD-9-CM: 787.01   
 Musculoskeletal pain     ICD-10-CM: M79.1 ICD-9-CM: 729.1 Medication monitoring encounter     ICD-10-CM: Z51.81 
ICD-9-CM: V58.83 Vitals BP Pulse Temp Resp Height(growth percentile) Weight(growth percentile) 155/83 92 97.5 °F (36.4 °C) (Oral) 14 5' 5\" (1.651 m) 129 lb (58.5 kg) LMP SpO2 BMI OB Status Smoking Status 12/30/1988 100% 21.47 kg/m2 Hysterectomy Former Smoker BMI and BSA Data Body Mass Index Body Surface Area  
 21.47 kg/m 2 1.64 m 2 Preferred Pharmacy Pharmacy Name Phone [facility], [address] Your Updated Medication List  
  
   
This list is accurate as of: 11/8/17 11:28 AM.  Always use your most recent med list.  
  
  
  
  
 aspirin delayed-release 81 mg tablet Take  by mouth daily. hydroCHLOROthiazide 25 mg tablet Commonly known as:  HYDRODIURIL Take 25 mg by mouth daily. levothyroxine 75 mcg tablet Commonly known as:  SYNTHROID Take 1 Tab by mouth Daily (before breakfast). LORazepam 0.5 mg tablet Commonly known as:  ATIVAN  
take 1 tablet by mouth twice a day if needed  
  
 naloxone 1 mg/mL injection Commonly known as:  NARCAN  
1 mL by IntraMUSCular route once as needed for up to 1 dose. omeprazole 40 mg capsule Commonly known as:  PRILOSEC Take 1 Cap by mouth two (2) times a day. * oxyCODONE-acetaminophen  mg per tablet Commonly known as:  PERCOCET 10 Take 0.5-1 Tabs by mouth every six (6) hours as needed for Pain for up to 30 days. Max Daily Amount: 4 Tabs. * oxyCODONE-acetaminophen  mg per tablet Commonly known as:  PERCOCET 10 Take 0.5-1 Tabs by mouth every six (6) hours as needed for Pain for up to 30 days. Max Daily Amount: 4 Tabs. Start taking on:  11/23/2017  
  
 promethazine 25 mg tablet Commonly known as:  PHENERGAN Take 1 Tab by mouth every six (6) hours as needed for Nausea. tiZANidine 4 mg tablet Commonly known as:  [place] Homans Take 1 Tab by mouth three (3) times daily as needed for Pain (spasm) for up to 30 days. traZODone 50 mg tablet Commonly known as:  Bart Torrezward Take 1 Tab by mouth nightly as needed for Sleep. VITAMIN B-12 PO Take 1 tablet by mouth as needed. * Notice: This list has 2 medication(s) that are the same as other medications prescribed for you. Read the directions carefully, and ask your doctor or other care provider to review them with you. Prescriptions Sent to Pharmacy Refills  
 promethazine (PHENERGAN) 25 mg tablet 0 Sig: Take 1 Tab by mouth every six (6) hours as needed for Nausea. Class: Normal  
 Pharmacy: [facility], [address] [facility] #: [phone]  Route: Oral  
 naloxone (NARCAN) 1 mg/mL injection 0 Si mL by IntraMUSCular route once as needed for up to 1 dose. Class: Normal  
 Pharmacy: [facility], [address] [facility] Ph #: [phone] Route: IntraMUSCular  
 tiZANidine (ZANAFLEX) 4 mg tablet 0 Sig: Take 1 Tab by mouth three (3) times daily as needed for Pain (spasm) for up to 30 days. Class: Normal  
 Pharmacy: [facility], [address] [facility] Ph #: [phone] Route: Oral  
  
We Performed the Following REFERRAL TO NEUROLOGY [CIA02 Custom] Referral Information Referral ID Referred By Referred To  
  
 [id number] May Shetty MD   
   [address] [place] [address] [address] [place] [address], [address] Phone: [phone] Fax: [phone] Visits Status Start Date End Date 1 New Request 17 If your referral has a status of pending review or denied, additional information will be sent to support the outcome of this decision. Patient Instructions Please contact our office if you have any questions about your visit today. Cardiology: Dr Jack Wheeler Phone Address [address] [place] [address]! Aida Ernandez introduces [employer] patient portal. Now you can access parts of your medical record, email your doctor's office, and request medication refills online. 1. In your internet browser, go to [email or web address] 2. Click on the First Time User? Click Here link in the Sign In box. You will see the New Member Sign Up page. 3. Enter your [employer] Access Code exactly as it appears below. You will not need to use this code after youve completed the sign-up process. If you do not sign up before the expiration date, you must request a new code. · [employer] Access Code: [email or web address] Expires: 2017  2:24 PM 
 4. Enter the last four digits of your Social Security Number (xxxx) and Date of Birth (mm/dd/yyyy) as indicated and click Submit. You will be taken to the next sign-up page. 5. Create a [employer] ID. This will be your [employer] login ID and cannot be changed, so think of one that is secure and easy to remember. 6. Create a [employer] password. You can change your password at any time. 7. Enter your Password Reset Question and Answer. This can be used at a later time if you forget your password. 8. Enter your e-mail address. You will receive e-mail notification when new information is available in [address] 9. Click Sign Up. You can now view and download portions of your medical record. 10. Click the Download Summary menu link to download a portable copy of your medical information. If you have questions, please visit the Frequently Asked Questions section of the [employer] website. Remember, [employer] is NOT to be used for urgent needs. For medical emergencies, dial 911. Now available from your iPhone and Android! Please provide this summary of care documentation to your next provider. Your primary care clinician is listed as Loyd Gomez. If you have any questions after today's visit, please call [phone].

## 2017-11-08 NOTE — PATIENT INSTRUCTIONS
Please contact our office if you have any questions about your visit today.       Cardiology: Dr [employer]   Phone Address   [address]

## 2017-11-08 NOTE — PROGRESS NOTES
HISTORY OF PRESENT ILLNESS  Thanh Casillas is a 58 y.o. female. Chief Complaint   Patient presents with    Follow-up     2 week f/u    Insomnia    Anxiety       HPI  Pt is here for a 2 week follow up of Insomnia, and Anxiety. Recent hospital admission for syncope and possible seizures discussed. Pt has not seen gi for a long time. She needs a referral to be seen. She is having decreased appetite lately. She has been having a lot of vomiting. Pt stayed with her dtr after her hospital d/c. She reports that she lost her meds in moving home. She does not have her pain med or her muscle relaxer. Pt requests refill of her muscle relaxer as she is aware that she cannot get a refill of the pain med. Pt has not yet seen cardiology or neurology since her d/c from the hospital.      Pt does need medication refills today. Pt requests electronic refills. New concerns today: Pt is requesting a rx for flexeril because she lost her prescriptions including her zanaflex prescribed by DR. Florentin Perez. Review of Systems   Constitutional: Negative. HENT: Negative. Respiratory: Negative. Cardiovascular: Negative. Gastrointestinal: Positive for vomiting. Musculoskeletal: Positive for back pain and neck pain. All other systems reviewed and are negative. Physical Exam  Physical Exam   Nursing note and vitals reviewed. Constitutional: She is oriented to person, place, and time. She appears well-developed and well-nourished. HENT:   Head: Normocephalic and atraumatic. Right Ear: External ear normal.   Left Ear: External ear normal.   Nose: Nose normal.   Eyes: Conjunctivae and EOM are normal.   Neck: Normal range of motion. Neck supple. No JVD present. Carotid bruit is not present. No thyromegaly present. Cardiovascular: Normal rate, regular rhythm, normal heart sounds and intact distal pulses. Exam reveals no gallop and no friction rub. No murmur heard.   Pulmonary/Chest: Effort normal and breath sounds normal. She has no wheezes. She has no rhonchi. She has no rales. Abdominal: Soft. Bowel sounds are normal.   Musculoskeletal: Normal range of motion. Neurological: She is alert and oriented to person, place, and time. Coordination normal.   Skin: Skin is warm and dry. Psychiatric: She has a normal mood and affect. Her behavior is normal. Judgment and thought content normal.     ASSESSMENT and PLAN  Diagnoses and all orders for this visit:    1. Syncope, unspecified syncope type  -     [facility] ref [facility]    2. Nausea and vomiting, intractability of vomiting not specified, unspecified vomiting type  -     promethazine (PHENERGAN) 25 mg tablet; Take 1 Tab by mouth every six (6) hours as needed for Nausea. 3. Musculoskeletal pain  -     tiZANidine (ZANAFLEX) 4 mg tablet; Take 1 Tab by mouth three (3) times daily as needed for Pain (spasm) for up to 30 days. 4. Medication monitoring encounter  -     naloxone (NARCAN) 1 mg/mL injection; 1 mL by IntraMUSCular route once as needed for up to 1 dose.       Follow-up Disposition: 2-3 months; sooner prn

## 2017-11-08 NOTE — PROGRESS NOTES
Jossy Brewer 58 y.o. female   Chief Complaint   Patient presents with    Follow-up     2 week f/u    Insomnia    Anxiety         1. Have you been to the ER, urgent care clinic since your last visit? Hospitalized since your last visit? No    2. Have you seen or consulted any other health care providers outside of the [address] [place] [address] since your last visit? Include any pap smears or colon screening.  No

## 2017-11-10 ENCOUNTER — TELEPHONE (OUTPATIENT)
Dept: FAMILY MEDICINE CLINIC | Age: 62
End: 2017-11-10

## 2017-11-10 NOTE — TELEPHONE ENCOUNTER
They received a referral,said pt had colonoscopy done in April with dr mayer--pt needs to see this physicain or dr Ana Rainey--

## 2017-11-13 ENCOUNTER — TELEPHONE (OUTPATIENT)
Dept: FAMILY MEDICINE CLINIC | Age: 62
End: 2017-11-13

## 2017-12-14 DIAGNOSIS — G47.00 INSOMNIA, UNSPECIFIED TYPE: ICD-10-CM

## 2017-12-14 DIAGNOSIS — F41.9 ANXIETY: ICD-10-CM

## 2017-12-15 RX ORDER — LORAZEPAM 0.5 MG/1
TABLET ORAL
Qty: 60 TAB | Refills: 0 | Status: SHIPPED | OUTPATIENT
Start: 2017-12-15 | End: 2018-01-15 | Stop reason: SDUPTHER

## 2017-12-15 RX ORDER — TRAZODONE HYDROCHLORIDE 50 MG/1
TABLET ORAL
Qty: 30 TAB | Refills: 5 | Status: SHIPPED | OUTPATIENT
Start: 2017-12-15 | End: 2018-05-16 | Stop reason: SDUPTHER

## 2017-12-26 ENCOUNTER — TELEPHONE (OUTPATIENT)
Dept: PAIN MANAGEMENT | Age: 62
End: 2017-12-26

## 2017-12-26 DIAGNOSIS — M96.1 POSTLAMINECTOMY SYNDROME, CERVICAL REGION: ICD-10-CM

## 2017-12-26 RX ORDER — OXYCODONE AND ACETAMINOPHEN 10; 325 MG/1; MG/1
.5-1 TABLET ORAL
Qty: 120 TAB | Refills: 0 | Status: CANCELLED | OUTPATIENT
Start: 2017-12-26 | End: 2018-01-25

## 2017-12-26 RX ORDER — OXYCODONE AND ACETAMINOPHEN 10; 325 MG/1; MG/1
.5-1 TABLET ORAL
Qty: 120 TAB | Refills: 0 | Status: SHIPPED | OUTPATIENT
Start: 2017-12-26 | End: 2018-01-18 | Stop reason: SDUPTHER

## 2017-12-26 NOTE — TELEPHONE ENCOUNTER
Please inform patient that we cannot fill this medication, and she will need to contact pain management.

## 2017-12-26 NOTE — TELEPHONE ENCOUNTER
Spoke with Dr. Jesus Pichardo, she stated that she will fill patients Rx once confirmed by Pain Management that it is okay to fill medication in absence of doctors.

## 2017-12-26 NOTE — TELEPHONE ENCOUNTER
Patient walked in office thinking she had an appointment today and is out of medications. Appointment was cancelled and a message was left with patient. Patient states she was in [place] and didn't get message. Provider out of office. Patient called PCP Malachi Carver to fill her prescription until she can be seen in January 2018.

## 2017-12-26 NOTE — TELEPHONE ENCOUNTER
Pt called and stated that she would like refill of medication. Pt stated that she is under pain management and her provider is out of the office and her medication can not be filled. Pt stated that she would like Dr. Crystal Cohen to fill her medication. Patient was informed that it was not guaranteed that Rx could be filled by Dr. Crystal Cohen. Pt then stated that she was on her way to the office to receive medication. Nurse has been made aware. Requested Prescriptions     Pending Prescriptions Disp Refills    oxyCODONE-acetaminophen (PERCOCET 10)  mg per tablet 120 Tab 0     Sig: Take 0.5-1 Tabs by mouth every six (6) hours as needed for Pain for up to 30 days. Max Daily Amount: 4 Tabs. Spoke with Nurse Poppy Houston., She stated that patient will be discharged from Pain Management if PCP office fills her medication.

## 2017-12-26 NOTE — TELEPHONE ENCOUNTER
Spoke with Ale Morales at [facility] okay for PCP to refill medications until she can be seen here at [facility].

## 2018-01-15 DIAGNOSIS — F41.9 ANXIETY: ICD-10-CM

## 2018-01-15 DIAGNOSIS — G47.00 INSOMNIA, UNSPECIFIED TYPE: ICD-10-CM

## 2018-01-15 RX ORDER — LORAZEPAM 0.5 MG/1
TABLET ORAL
Qty: 60 TAB | Refills: 0 | Status: SHIPPED | OUTPATIENT
Start: 2018-01-15 | End: 2018-05-08 | Stop reason: SDUPTHER

## 2018-01-18 ENCOUNTER — OFFICE VISIT (OUTPATIENT)
Dept: PAIN MANAGEMENT | Age: 63
End: 2018-01-18

## 2018-01-18 VITALS
HEART RATE: 64 BPM | WEIGHT: 136.5 LBS | SYSTOLIC BLOOD PRESSURE: 174 MMHG | BODY MASS INDEX: 22.71 KG/M2 | RESPIRATION RATE: 20 BRPM | DIASTOLIC BLOOD PRESSURE: 83 MMHG | TEMPERATURE: 96.5 F

## 2018-01-18 DIAGNOSIS — M96.1 POSTLAMINECTOMY SYNDROME, CERVICAL REGION: ICD-10-CM

## 2018-01-18 DIAGNOSIS — M54.2 NECK PAIN: Primary | ICD-10-CM

## 2018-01-18 DIAGNOSIS — Z98.1 STATUS POST CERVICAL SPINAL FUSION: ICD-10-CM

## 2018-01-18 DIAGNOSIS — M79.18 MUSCULOSKELETAL PAIN: ICD-10-CM

## 2018-01-18 DIAGNOSIS — Z98.1 STATUS POST CERVICAL SPINAL ARTHRODESIS: ICD-10-CM

## 2018-01-18 DIAGNOSIS — M50.30 DDD (DEGENERATIVE DISC DISEASE), CERVICAL: ICD-10-CM

## 2018-01-18 DIAGNOSIS — Z98.890 S/P CERVICAL DISCECTOMY: ICD-10-CM

## 2018-01-18 DIAGNOSIS — G89.4 CHRONIC PAIN SYNDROME: ICD-10-CM

## 2018-01-18 RX ORDER — TIZANIDINE 4 MG/1
4 TABLET ORAL
Qty: 90 TAB | Refills: 2 | Status: SHIPPED | OUTPATIENT
Start: 2018-01-18 | End: 2018-02-17

## 2018-01-18 RX ORDER — OXYCODONE AND ACETAMINOPHEN 10; 325 MG/1; MG/1
.5-1 TABLET ORAL
Qty: 120 TAB | Refills: 0 | Status: SHIPPED | OUTPATIENT
Start: 2018-01-18 | End: 2018-02-17

## 2018-01-18 RX ORDER — OXYCODONE AND ACETAMINOPHEN 10; 325 MG/1; MG/1
.5-1 TABLET ORAL
Qty: 120 TAB | Refills: 0 | Status: SHIPPED | OUTPATIENT
Start: 2018-03-16 | End: 2018-04-15

## 2018-01-18 RX ORDER — OXYCODONE AND ACETAMINOPHEN 10; 325 MG/1; MG/1
.5-1 TABLET ORAL
Qty: 120 TAB | Refills: 0 | Status: SHIPPED | OUTPATIENT
Start: 2018-02-17 | End: 2018-03-19

## 2018-01-18 NOTE — PROGRESS NOTES
Nursing Notes    Patient presents to the office today in follow-up. Patient rates her pain at 8/10 on the numerical pain scale. Reviewed medications with counts as follows:    Rx Date filled Qty Dispensed Pill Count Last Dose Short                                                      Comments:     POC UDS was not performed in office today    Any new labs or imaging since last appointment? YES    Have you been to an emergency room (ER) or urgent care clinic since your last visit? YES ; [facility] emergency dept            Have you been hospitalized since your last visit? YES     If yes, where, when, and reason for visit? [facility] 10/2017     Have you seen or consulted any other health care providers outside of the [facility]  since your last visit? YES     If yes, where, when, and reason for visit? Hospitalists, emergency dept physicans     Patient has not been seen in this office since 09/2017 ; received prescription from pcp due to provider not in office. HM deferred to pcp.

## 2018-01-22 ENCOUNTER — TELEPHONE (OUTPATIENT)
Dept: PAIN MANAGEMENT | Age: 63
End: 2018-01-22

## 2018-01-22 NOTE — TELEPHONE ENCOUNTER
The pt called the office because she received news this morning that her sister passed away last night. She is due to get her percocet filled tomorrow but is asking to fill them today because she has to travel over 800 miles and will be gone for at least 2 weeks. This information was brought to Dr. Brie Hernandez for review. He is familiar with the pt and states that she can get this filled today. I called the pt's [address] and spoke to the pharmacist. The verbal order was given to fill the percocet today. The order was RBV'd. I called the pt back and made her aware of this. She verbalized gratitude and has no other requests at this time.

## 2018-02-08 ENCOUNTER — OFFICE VISIT (OUTPATIENT)
Dept: ORTHOPEDIC SURGERY | Age: 63
End: 2018-02-08

## 2018-02-08 VITALS
TEMPERATURE: 96.7 F | HEIGHT: 65 IN | HEART RATE: 52 BPM | WEIGHT: 129 LBS | SYSTOLIC BLOOD PRESSURE: 167 MMHG | DIASTOLIC BLOOD PRESSURE: 76 MMHG | OXYGEN SATURATION: 100 % | BODY MASS INDEX: 21.49 KG/M2

## 2018-02-08 DIAGNOSIS — M17.0 ARTHRITIS OF BOTH KNEES: ICD-10-CM

## 2018-02-08 DIAGNOSIS — M25.561 PAIN IN BOTH KNEES, UNSPECIFIED CHRONICITY: Primary | ICD-10-CM

## 2018-02-08 DIAGNOSIS — M25.562 PAIN IN BOTH KNEES, UNSPECIFIED CHRONICITY: Primary | ICD-10-CM

## 2018-02-08 RX ORDER — TRIAMCINOLONE ACETONIDE 40 MG/ML
40 INJECTION, SUSPENSION INTRA-ARTICULAR; INTRAMUSCULAR ONCE
Qty: 1 ML | Refills: 0
Start: 2018-02-08 | End: 2018-02-08

## 2018-02-08 NOTE — MR AVS SNAPSHOT
[address], [address] [facility] 
[phone] Patient: Erickson Vitale MRN: [id number] OEN:[birth year] Visit Information Date & Time Provider Department Dept. Phone Encounter #  
 2/8/2018  9:15 AM Cheryl Witt  [facility], [address] [facility] [id number] Your Appointments 3/2/2018  3:20 PM  
Follow Up with Karlene Mendoza MD  
[facility]) Appt Note: Dr. Jordin Darden; syncope; ref & notes in cc. ..ywp  
 [facility] [address] [facility] [address] [phone]  
  
   
 [facility] [address] Upcoming Health Maintenance Date Due DTaP/Tdap/Td series (1 - Tdap) 1/29/1976 PAP AKA CERVICAL CYTOLOGY 9/30/2017 BREAST CANCER SCRN MAMMOGRAM 11/2/2017 COLONOSCOPY 4/18/2022 Allergies as of 2/8/2018  Review Complete On: 2/8/2018 By: Jenni Guerrero Severity Noted Reaction Type Reactions Codeine  12/23/2013   Intolerance Nausea and Vomiting Pcn [Penicillins]    Not Reported This Time, Hives Tramadol  12/23/2013    Palpitations Vicodin [Hydrocodone-acetaminophen]  12/23/2013    Itching, Hives Current Immunizations  Reviewed on 6/25/2014 No immunizations on file. Not reviewed this visit You Were Diagnosed With   
  
 Codes Comments Pain in both knees, unspecified chronicity    -  Primary ICD-10-CM: M25.561, M25.562 ICD-9-CM: 719.46 Arthritis of both knees     ICD-10-CM: M17.0 ICD-9-CM: 716.96 Vitals BP Pulse Temp Height(growth percentile) Weight(growth percentile) LMP  
 167/76 (BP 1 Location: Left arm, BP Patient Position: Sitting) (!) 52 96.7 °F (35.9 °C) 5' 5\" (1.651 m) 129 lb (58.5 kg) 12/30/1988 SpO2 BMI OB Status Smoking Status 100% 21.47 kg/m2 Hysterectomy Former Smoker BMI and BSA Data Body Mass Index Body Surface Area  
 21.47 kg/m 2 1.64 m 2 Preferred Pharmacy Pharmacy Name Phone [address] [facility], [address] Your Updated Medication List  
  
   
This list is accurate as of: 2/8/18  9:50 AM.  Always use your most recent med list.  
  
  
  
  
 aspirin delayed-release 81 mg tablet Take  by mouth daily. hydroCHLOROthiazide 25 mg tablet Commonly known as:  HYDRODIURIL Take 25 mg by mouth daily. levothyroxine 75 mcg tablet Commonly known as:  SYNTHROID Take 1 Tab by mouth Daily (before breakfast). LORazepam 0.5 mg tablet Commonly known as:  ATIVAN  
take 1 tablet by mouth twice a day if needed  
  
 naloxone 1 mg/mL injection Commonly known as:  NARCAN  
1 mL by IntraMUSCular route once as needed for up to 1 dose. omeprazole 40 mg capsule Commonly known as:  PRILOSEC Take 1 Cap by mouth two (2) times a day. * oxyCODONE-acetaminophen  mg per tablet Commonly known as:  PERCOCET 10 Take 0.5-1 Tabs by mouth every six (6) hours as needed for Pain for up to 30 days. Max Daily Amount: 4 Tabs. * oxyCODONE-acetaminophen  mg per tablet Commonly known as:  PERCOCET 10 Take 0.5-1 Tabs by mouth every six (6) hours as needed for Pain for up to 30 days. Max Daily Amount: 4 Tabs. Start taking on:  2/17/2018 * oxyCODONE-acetaminophen  mg per tablet Commonly known as:  PERCOCET 10 Take 0.5-1 Tabs by mouth every six (6) hours as needed for Pain for up to 30 days. Max Daily Amount: 4 Tabs. Start taking on:  3/16/2018  
  
 promethazine 25 mg tablet Commonly known as:  PHENERGAN Take 1 Tab by mouth every six (6) hours as needed for Nausea. tiZANidine 4 mg tablet Commonly known as:  Sarajane Darting Take 1 Tab by mouth three (3) times daily as needed for Pain (spasm) for up to 30 days. traZODone 50 mg tablet Commonly known as:  Sheldon Smith  
 take 1 tablet by mouth at bedtime if needed for sleep VITAMIN B-12 PO Take 1 tablet by mouth as needed. * Notice: This list has 3 medication(s) that are the same as other medications prescribed for you. Read the directions carefully, and ask your doctor or other care provider to review them with you. We Performed the Following AMB POC XRAY, KNEE; 1/2 VIEWS [01810 CPT(R)] AMB POC XRAY, KNEE; 1/2 VIEWS [93304 CPT(R)] Introducing [facility]! [facility] introduces [employer] patient portal. Now you can access parts of your medical record, email your doctor's office, and request medication refills online. 1. In your internet browser, go to [email or web address] 2. Click on the First Time User? Click Here link in the Sign In box. You will see the New Member Sign Up page. 3. Enter your [employer] Access Code exactly as it appears below. You will not need to use this code after youve completed the sign-up process. If you do not sign up before the expiration date, you must request a new code. · [employer] Access Code: [email or web address] Expires: 5/9/2018  9:50 AM 
 
4. Enter the last four digits of your Social Security Number (xxxx) and Date of Birth (mm/dd/yyyy) as indicated and click Submit. You will be taken to the next sign-up page. 5. Create a [facility] [employer] [facility] ID. This will be your [facility] login ID and cannot be changed, so think of one that is secure and easy to remember. 6. Create a [facility] password. You can change your password at any time. 7. Enter your Password Reset Question and Answer. This can be used at a later time if you forget your password. 8. Enter your e-mail address. You will receive e-mail notification when new information is available in [address] 9. Click Sign Up. You can now view and download portions of your medical record. 10. Click the Download Summary menu link to download a portable copy of your medical information. If you have questions, please visit the Frequently Asked Questions section of the [employer] website. Remember, [facility] is NOT to be used for urgent needs. For medical emergencies, dial 911. Now available from your iPhone and Android! Please provide this summary of care documentation to your next provider. Your primary care clinician is listed as Kelli Nations. If you have any questions after today's visit, please call [phone].

## 2018-02-08 NOTE — PROGRESS NOTES
HISTORY OF PRESENT ILLNESS: Ms. Gus Cary is a 44-year-old female who is here for consultation regarding bilateral knee pain. She has some known osteoarthritis of her knees. For a number of years she has had Cortisone injections once a year by another physician but she could not remember where this physician was located. She states the Cortisone injections have helped her. She points to pain in the anterior aspect of both knees. She has noticed some slight stiffness in her knees. She is however pleased with the results of the Cortisone injections. There is no history of trauma to her knees. She does not utilize any ambulatory assist.  She does not wear a brace on her knees. She does not take anti-inflammatory medication routinely. She has never had surgery on her knees. PHYSICAL EXAMINATION:  Reveals a healthy-appearing, thin, 44-year-old, -American female in mild discomfort. She is alert and oriented x 3 and answers questions appropriately. With reference to her knees, she has a slight genu varus deformity of the right knee. This is correctable in neutral position passively. She has palpable medial joint line tenderness. She has no palpable lateral joint line tenderness. She has crepitus in the patellofemoral area of the right knee with flexion and extension. She has good collateral, as well as cruciate ligament stability of the right knee. Lachman test is negative. Anterior and posterior drawer tests are negative. She has no opening to varus or valgus stress testing in full extension or 30º of flexion. She has no right calf tenderness or swelling. Neurovascular testing is intact to the right foot distally. Range of motion of her right knee is from full extension to flexion of about 125º. With reference to her left knee, she has good range of motion of her left knee from full extension to flexion about 125º. She has no obvious deformity of her left knee.   She has slight palpable medial joint line tenderness. She has crepitus in the patellofemoral area of her left knee with flexion and extension. Jaceks test is negative. Lachman test is negative. She has good collateral ligament stability of her left knee. Anterior and posterior drawer tests are negative. She has no opening to varus or valgus stress testing in full extension or 30º of flexion. She has no left calf tenderness or swelling. Neurovascular testing is intact to the left foot distally. RADIOGRAPHS:  X-rays of both knees today reveal moderate osteoarthritis of both knees. She has about 50% narrowing of the joint space in the medial compartment of both knees. She has slight radiographic genu varus deformities present bilaterally. There is increased sclerosis in the medial compartment of both knees. She has minimal osteophyte formation. PROCEDURE:  Under sterile technique with the patients permission and a time-out, I did inject the medial compartment of both knees with Kenalog. The patient tolerated the procedure well. IMPRESSION: A 59-year-old female with moderate osteoarthritis, both knees, medial compartments. RECOMMENDATIONS:  At this point she has done very well with conservative treatment, the Cortisone injections. I have discussed with her if she feels any weakness in her knees in the future, consideration could be made for a course of therapy which would help her function in daily life. Ultimately if her symptoms and radiographs become worse she would be a candidate for knee arthroplasty surgery. All of her questions were answered today.                  Vitals:    02/08/18 0910   BP: 167/76   Pulse: (!) 52   Temp: 96.7 °F (35.9 °C)   SpO2: 100%   Weight: 58.5 kg (129 lb)   Height: 5' 5\" (1.651 m)   LMP: 12/30/1988       Patient Active Problem List   Diagnosis Code    S/P gastric bypass - date of surgery 02/10 Z98.84    Cervical disc disease with myelopathy M50.00    S/P cervical discectomy Z98.890    Status post cervical spinal arthrodesis Z98.1    Chronic pain syndrome G89.4    Postlaminectomy syndrome, cervical region M96.1    Myalgia and myositis, unspecified [id number]    Encounter for postoperative care Z48.89    Thyroid disease E07.9    Essential hypertension I10    Hypercholesterolemia E78.00    Musculoskeletal pain M79.1    DDD (degenerative disc disease), cervical M50.30    Status post cervical spinal fusion Z98.1    Cervicogenic headache R51    Cervical dystonia G24.3    Neck pain M54.2    Esophageal dysfunction K22.4    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    Supraventricular tachycardia (HCC) I47.1    Gastroesophageal reflux disease with esophagitis K21.0    Anxiety F41.9    Weakness generalized R53.1     Patient Active Problem List    Diagnosis Date Noted    Supraventricular tachycardia ([facility] [address] 01/30/2017    Gastroesophageal reflux disease with esophagitis 01/30/2017    Anxiety 01/30/2017    Weakness generalized 01/30/2017    Esophageal dysfunction 11/17/2016    Post-resection malabsorption 11/17/2016    Neck pain 09/14/2015    Musculoskeletal pain 09/05/2014    DDD (degenerative disc disease), cervical 09/05/2014    Status post cervical spinal fusion 09/05/2014    Cervicogenic headache 09/05/2014    Cervical dystonia 09/05/2014    Thyroid disease     Essential hypertension     Hypercholesterolemia     Encounter for postoperative care 02/18/2014    Chronic pain syndrome 01/31/2014    Postlaminectomy syndrome, cervical region 01/31/2014    Myalgia and myositis, unspecified 01/31/2014    Cervical disc disease with myelopathy 05/24/2013    S/P cervical discectomy 05/24/2013    Status post cervical spinal arthrodesis 05/24/2013    S/P gastric bypass - date of surgery 02/10 05/29/2012     Current Outpatient Prescriptions   Medication Sig Dispense Refill    oxyCODONE-acetaminophen (PERCOCET 10)  mg per tablet Take 0.5-1 Tabs by mouth every six (6) hours as needed for Pain for up to 30 days. Max Daily Amount: 4 Tabs. 120 Tab 0    [START ON 3/16/2018] oxyCODONE-acetaminophen (PERCOCET 10)  mg per tablet Take 0.5-1 Tabs by mouth every six (6) hours as needed for Pain for up to 30 days. Max Daily Amount: 4 Tabs. 120 Tab 0    [START ON 2/17/2018] oxyCODONE-acetaminophen (PERCOCET 10)  mg per tablet Take 0.5-1 Tabs by mouth every six (6) hours as needed for Pain for up to 30 days. Max Daily Amount: 4 Tabs. 120 Tab 0    tiZANidine (ZANAFLEX) 4 mg tablet Take 1 Tab by mouth three (3) times daily as needed for Pain (spasm) for up to 30 days. 90 Tab 2    LORazepam (ATIVAN) 0.5 mg tablet take 1 tablet by mouth twice a day if needed 60 Tab 0    traZODone (DESYREL) 50 mg tablet take 1 tablet by mouth at bedtime if needed for sleep 30 Tab 5    promethazine (PHENERGAN) 25 mg tablet Take 1 Tab by mouth every six (6) hours as needed for Nausea. 60 Tab 0    naloxone (NARCAN) 1 mg/mL injection 1 mL by IntraMUSCular route once as needed for up to 1 dose. 1 Syringe 0    omeprazole (PRILOSEC) 40 mg capsule Take 1 Cap by mouth two (2) times a day. 60 Cap 5    hydroCHLOROthiazide (HYDRODIURIL) 25 mg tablet Take 25 mg by mouth daily. 0    aspirin delayed-release 81 mg tablet Take  by mouth daily.  levothyroxine (SYNTHROID) 75 mcg tablet Take 1 Tab by mouth Daily (before breakfast). 30 Tab 5    CYANOCOBALAMIN, VITAMIN B-12, (VITAMIN B-12 PO) Take 1 tablet by mouth as needed.        Allergies   Allergen Reactions    Codeine Nausea and Vomiting    Pcn [Penicillins] Not Reported This Time and Hives    Tramadol Palpitations    Vicodin [Hydrocodone-Acetaminophen] Itching and Hives     Past Medical History:   Diagnosis Date    Anxiety disorder     Arthritis     Cervical dystonia 9/5/2014    Cervical pain     Cervicogenic headache 9/5/2014    Chronic pain     knee    DDD (degenerative disc disease), cervical 9/5/2014    Essential hypertension  Fibrillation, atrial (HCC)     Fibromyalgia     GERD (gastroesophageal reflux disease)     Headache(784.0)     Hepatic steatosis     Herniated disc, cervical     Hypercholesterolemia     Hypertension     Joint pain     JRA (juvenile rheumatoid arthritis) (HCC)     Muscle pain     Musculoskeletal pain 9/5/2014    Numbness and tingling of left arm and leg     Status post cervical spinal fusion 9/5/2014    Thyroid disease     Vision decreased      Past Surgical History:   Procedure Laterality Date    COLONOSCOPY N/A 4/18/2017    COLONOSCOPY, DIAGNOSTIC performed by Jae Blanc MD at [address] [facility] [address]  5/21/13    C3-4 ACDF     HX GASTRIC BYPASS  02/03/10    HX LUMBAR LAMINECTOMY      HX ORTHOPAEDIC  2013    Spinal SurgeryC-2 AND C-3    HX OTHER SURGICAL      GIST tumor resected    HX OTHER SURGICAL      teeth extractions    HX PARTIAL HYSTERECTOMY  1988    LA EXCISION TUMOR SOFT TISS FACE/SCALP SUBQ < 2CM N/A 09/30/2016    Dr. SAMANO [facility]     Family History   Problem Relation Age of Onset    Hypertension Mother     Heart Failure Mother     Hypertension Father     Arthritis-osteo Father      Social History   Substance Use Topics    Smoking status: Former Smoker     Types: Cigarettes     Quit date: 12/31/1989    Smokeless tobacco: Never Used    Alcohol use No

## 2018-02-14 RX ORDER — HYDROCHLOROTHIAZIDE 25 MG/1
TABLET ORAL
Qty: 30 TAB | Refills: 5 | Status: SHIPPED | OUTPATIENT
Start: 2018-02-14 | End: 2019-02-07 | Stop reason: SDUPTHER

## 2018-05-08 RX ORDER — LORAZEPAM 0.5 MG/1
TABLET ORAL
Qty: 60 TAB | Refills: 0 | OUTPATIENT
Start: 2018-05-08

## 2018-05-08 RX ORDER — NALOXONE HYDROCHLORIDE 2 MG/.4ML
INJECTION, SOLUTION INTRAMUSCULAR; SUBCUTANEOUS
Refills: 0 | OUTPATIENT
Start: 2018-05-08

## 2018-05-16 ENCOUNTER — OFFICE VISIT (OUTPATIENT)
Dept: FAMILY MEDICINE CLINIC | Age: 63
End: 2018-05-16

## 2018-05-16 ENCOUNTER — HOSPITAL ENCOUNTER (OUTPATIENT)
Dept: LAB | Age: 63
Discharge: HOME | End: 2018-05-16
Payer: MEDICARE

## 2018-05-16 VITALS
TEMPERATURE: 98.2 F | HEIGHT: 65 IN | WEIGHT: 143 LBS | RESPIRATION RATE: 14 BRPM | DIASTOLIC BLOOD PRESSURE: 69 MMHG | BODY MASS INDEX: 23.82 KG/M2 | SYSTOLIC BLOOD PRESSURE: 128 MMHG | HEART RATE: 72 BPM

## 2018-05-16 DIAGNOSIS — E07.9 THYROID DISEASE: ICD-10-CM

## 2018-05-16 DIAGNOSIS — I10 ESSENTIAL HYPERTENSION: ICD-10-CM

## 2018-05-16 DIAGNOSIS — Z12.31 ENCOUNTER FOR SCREENING MAMMOGRAM FOR MALIGNANT NEOPLASM OF BREAST: ICD-10-CM

## 2018-05-16 DIAGNOSIS — R20.0 NUMBNESS AND TINGLING OF BOTH FEET: ICD-10-CM

## 2018-05-16 DIAGNOSIS — Z98.84 S/P GASTRIC BYPASS: ICD-10-CM

## 2018-05-16 DIAGNOSIS — G47.00 INSOMNIA, UNSPECIFIED TYPE: ICD-10-CM

## 2018-05-16 DIAGNOSIS — E78.00 HYPERCHOLESTEROLEMIA: ICD-10-CM

## 2018-05-16 DIAGNOSIS — R20.2 NUMBNESS AND TINGLING OF BOTH FEET: ICD-10-CM

## 2018-05-16 DIAGNOSIS — K91.2 POSTSURGICAL MALABSORPTION, NOT ELSEWHERE CLASSIFIED: ICD-10-CM

## 2018-05-16 DIAGNOSIS — I10 ESSENTIAL HYPERTENSION: Primary | ICD-10-CM

## 2018-05-16 DIAGNOSIS — Z51.81 MEDICATION MONITORING ENCOUNTER: ICD-10-CM

## 2018-05-16 LAB
BASOPHILS # BLD: 0 K/UL (ref 0–0.06)
BASOPHILS NFR BLD: 0 % (ref 0–2)
DIFFERENTIAL METHOD BLD: ABNORMAL
EOSINOPHIL # BLD: 0.1 K/UL (ref 0–0.4)
EOSINOPHIL NFR BLD: 1 % (ref 0–5)
ERYTHROCYTE [DISTWIDTH] IN BLOOD BY AUTOMATED COUNT: 15.7 % (ref 11.6–14.5)
HCT VFR BLD AUTO: 37.3 % (ref 35–45)
HGB BLD-MCNC: 11 G/DL (ref 12–16)
LYMPHOCYTES # BLD: 3 K/UL (ref 0.9–3.6)
LYMPHOCYTES NFR BLD: 29 % (ref 21–52)
MCH RBC QN AUTO: 28.4 PG (ref 24–34)
MCHC RBC AUTO-ENTMCNC: 29.5 G/DL (ref 31–37)
MCV RBC AUTO: 96.4 FL (ref 74–97)
MONOCYTES # BLD: 0.8 K/UL (ref 0.05–1.2)
MONOCYTES NFR BLD: 8 % (ref 3–10)
NEUTS SEG # BLD: 6.3 K/UL (ref 1.8–8)
NEUTS SEG NFR BLD: 62 % (ref 40–73)
PLATELET # BLD AUTO: 311 K/UL (ref 135–420)
PMV BLD AUTO: 10.9 FL (ref 9.2–11.8)
RBC # BLD AUTO: 3.87 M/UL (ref 4.2–5.3)
WBC # BLD AUTO: 10.2 K/UL (ref 4.6–13.2)

## 2018-05-16 PROCEDURE — 82306 VITAMIN D 25 HYDROXY: CPT | Performed by: FAMILY MEDICINE

## 2018-05-16 PROCEDURE — 80061 LIPID PANEL: CPT | Performed by: FAMILY MEDICINE

## 2018-05-16 PROCEDURE — 80053 COMPREHEN METABOLIC PANEL: CPT | Performed by: FAMILY MEDICINE

## 2018-05-16 PROCEDURE — 85025 COMPLETE CBC W/AUTO DIFF WBC: CPT | Performed by: FAMILY MEDICINE

## 2018-05-16 PROCEDURE — 82607 VITAMIN B-12: CPT | Performed by: FAMILY MEDICINE

## 2018-05-16 PROCEDURE — 83540 ASSAY OF IRON: CPT | Performed by: FAMILY MEDICINE

## 2018-05-16 PROCEDURE — 84443 ASSAY THYROID STIM HORMONE: CPT | Performed by: FAMILY MEDICINE

## 2018-05-16 PROCEDURE — 80307 DRUG TEST PRSMV CHEM ANLYZR: CPT | Performed by: FAMILY MEDICINE

## 2018-05-16 PROCEDURE — 36415 COLL VENOUS BLD VENIPUNCTURE: CPT | Performed by: FAMILY MEDICINE

## 2018-05-16 PROCEDURE — 84425 ASSAY OF VITAMIN B-1: CPT | Performed by: FAMILY MEDICINE

## 2018-05-16 RX ORDER — TIZANIDINE 4 MG/1
TABLET ORAL
Refills: 0 | COMMUNITY
Start: 2018-05-09 | End: 2019-07-08

## 2018-05-16 RX ORDER — ONDANSETRON HYDROCHLORIDE 8 MG/1
TABLET, FILM COATED ORAL
Refills: 0 | COMMUNITY
Start: 2018-05-08 | End: 2019-03-25 | Stop reason: SDUPTHER

## 2018-05-16 RX ORDER — OXYCODONE AND ACETAMINOPHEN 10; 325 MG/1; MG/1
TABLET ORAL
COMMUNITY
Start: 2018-05-15 | End: 2019-12-20 | Stop reason: ALTCHOICE

## 2018-05-16 RX ORDER — DICYCLOMINE HYDROCHLORIDE 10 MG/1
CAPSULE ORAL
Refills: 0 | COMMUNITY
Start: 2018-05-08 | End: 2020-02-03 | Stop reason: SDUPTHER

## 2018-05-16 RX ORDER — TRAZODONE HYDROCHLORIDE 50 MG/1
TABLET ORAL
Qty: 30 TAB | Refills: 5 | Status: SHIPPED | OUTPATIENT
Start: 2018-05-16 | End: 2018-11-07 | Stop reason: SDUPTHER

## 2018-05-16 NOTE — PROGRESS NOTES
Samson Brothers 61 y.o. female   Chief Complaint   Patient presents with    Follow-up    Hypertension    Anxiety    Insomnia         1. Have you been to the ER, urgent care clinic since your last visit? Hospitalized since your last visit? No    2. Have you seen or consulted any other health care providers outside of the [address] [place] [address] since your last visit? Include any pap smears or colon screening.  No

## 2018-05-16 NOTE — PROGRESS NOTES
HISTORY OF PRESENT ILLNESS  Gladys Varela is a 61 y.o. female. Chief Complaint   Patient presents with    Follow-up    Hypertension    Anxiety    Insomnia       HPI  Patient is here for a follow up of Insomnia, Htn, and Anxiety. Patient does not need medication refills today. New concerns today: Patient request labs, due to tingling in both feet. Pt cont to have nausea and vomiting. She has recently seen Dr Terrence Richardson (gi). He wants to repeat a study, per pt. She is taking her meds as rx'ed. Review of Systems   Constitutional: Negative. HENT: Negative. Respiratory: Negative. Cardiovascular: Negative. Neurological: Positive for tingling and sensory change. All other systems reviewed and are negative. Physical Exam  Physical Exam   Nursing note and vitals reviewed. Constitutional: She is oriented to person, place, and time. She appears well-developed and well-nourished. HENT:   Head: Normocephalic and atraumatic. Right Ear: External ear normal.   Left Ear: External ear normal.   Nose: Nose normal.   Eyes: Conjunctivae and EOM are normal.   Neck: Normal range of motion. Neck supple. No JVD present. Carotid bruit is not present. No thyromegaly present. Cardiovascular: Normal rate, regular rhythm, normal heart sounds and intact distal pulses. Exam reveals no gallop and no friction rub. No murmur heard. Pulmonary/Chest: Effort normal and breath sounds normal. She has no wheezes. She has no rhonchi. She has no rales. Abdominal: Soft. Bowel sounds are normal.   Musculoskeletal: Normal range of motion. Neurological: She is alert and oriented to person, place, and time. Coordination normal.   Skin: Skin is warm and dry. Psychiatric: She has a normal mood and affect. Her behavior is normal. Judgment and thought content normal.     ASSESSMENT and PLAN  Diagnoses and all orders for this visit:    1.  Essential hypertension  -     METABOLIC PANEL, COMPREHENSIVE; Future  -     LIPID PANEL; Future  Stable, cont pres tx plan. 2. Numbness and tingling of both feet  -     METABOLIC PANEL, COMPREHENSIVE; Future  -     TSH 3RD GENERATION; Future  -     VITAMIN B12 & FOLATE; Future  -     VITAMIN D, 25 HYDROXY; Future  -     IRON PROFILE; Future  -     VITAMIN B1, WHOLE BLOOD; Future    3. Insomnia, unspecified type  -     traZODone (DESYREL) 50 mg tablet; take 1 tablet by mouth at bedtime if needed for sleep    4. Thyroid disease  -     TSH 3RD GENERATION; Future    5. Hypercholesterolemia  -     METABOLIC PANEL, COMPREHENSIVE; Future  -     LIPID PANEL; Future    6. S/P gastric bypass - date of surgery 67/15  -     METABOLIC PANEL, COMPREHENSIVE; Future  -     LIPID PANEL; Future  -     TSH 3RD GENERATION; Future  -     VITAMIN B12 & FOLATE; Future  -     VITAMIN D, 25 HYDROXY; Future  -     IRON PROFILE; Future  -     VITAMIN B1, WHOLE BLOOD; Future  -     CBC WITH AUTOMATED DIFF; Future    7. Medication monitoring encounter  -     COMPLIANCE DRUG SCREEN/PRESCRIPTION MONITORING; Future  UDS collected in office today. Need for controlled substance agreement discussed. Letter printed; contract signed by patient.  profile reviewed. 8. Encounter for screening mammogram for malignant neoplasm of breast  -     ROBERTA MAMMO BI SCREENING INCL CAD; Future    9. Postsurgical malabsorption, not elsewhere classified   -     TSH 3RD GENERATION; Future  -     VITAMIN B12 & FOLATE; Future  -     VITAMIN D, 25 HYDROXY; Future  -     IRON PROFILE; Future  -     VITAMIN B1, WHOLE BLOOD; Future  -     CBC WITH AUTOMATED DIFF;  Future      Follow-up Disposition: 3 months; sooner prn

## 2018-05-17 LAB
25(OH)D3 SERPL-MCNC: 4.4 NG/ML (ref 30–100)
ALBUMIN SERPL-MCNC: 4.5 G/DL (ref 3.4–5)
ALBUMIN/GLOB SERPL: 1.3 {RATIO} (ref 0.8–1.7)
ALP SERPL-CCNC: 140 U/L (ref 45–117)
ALT SERPL-CCNC: 45 U/L (ref 13–56)
ANION GAP SERPL CALC-SCNC: 12 MMOL/L (ref 3–18)
AST SERPL-CCNC: 55 U/L (ref 15–37)
BILIRUB SERPL-MCNC: 0.3 MG/DL (ref 0.2–1)
BUN SERPL-MCNC: 9 MG/DL (ref 7–18)
BUN/CREAT SERPL: 8 (ref 12–20)
CALCIUM SERPL-MCNC: 9.5 MG/DL (ref 8.5–10.1)
CHLORIDE SERPL-SCNC: 110 MMOL/L (ref 100–108)
CHOLEST SERPL-MCNC: 207 MG/DL
CO2 SERPL-SCNC: 21 MMOL/L (ref 21–32)
CREAT SERPL-MCNC: 1.09 MG/DL (ref 0.6–1.3)
FOLATE SERPL-MCNC: 13 NG/ML (ref 3.1–17.5)
GLOBULIN SER CALC-MCNC: 3.4 G/DL (ref 2–4)
GLUCOSE SERPL-MCNC: 80 MG/DL (ref 74–99)
HDLC SERPL-MCNC: 121 MG/DL (ref 40–60)
HDLC SERPL: 1.7 {RATIO} (ref 0–5)
IRON SATN MFR SERPL: 6 %
IRON SERPL-MCNC: 30 UG/DL (ref 50–175)
LDLC SERPL CALC-MCNC: 66.6 MG/DL (ref 0–100)
LIPID PROFILE,FLP: ABNORMAL
POTASSIUM SERPL-SCNC: 5.1 MMOL/L (ref 3.5–5.5)
PROT SERPL-MCNC: 7.9 G/DL (ref 6.4–8.2)
SODIUM SERPL-SCNC: 143 MMOL/L (ref 136–145)
TIBC SERPL-MCNC: 488 UG/DL (ref 250–450)
TRIGL SERPL-MCNC: 97 MG/DL (ref ?–150)
TSH SERPL DL<=0.05 MIU/L-ACNC: 11.1 UIU/ML (ref 0.36–3.74)
VIT B12 SERPL-MCNC: 1421 PG/ML (ref 211–911)
VLDLC SERPL CALC-MCNC: 19.4 MG/DL

## 2018-05-20 LAB — VIT B1 BLD-SCNC: 107.8 NMOL/L (ref 66.5–200)

## 2018-05-22 LAB — DRUGS UR: NORMAL

## 2018-06-12 DIAGNOSIS — G47.00 INSOMNIA, UNSPECIFIED TYPE: ICD-10-CM

## 2018-06-12 DIAGNOSIS — F41.9 ANXIETY: ICD-10-CM

## 2018-06-14 RX ORDER — LORAZEPAM 0.5 MG/1
TABLET ORAL
Qty: 60 TAB | Refills: 0 | Status: SHIPPED | OUTPATIENT
Start: 2018-06-14 | End: 2019-12-20 | Stop reason: ALTCHOICE

## 2018-11-07 DIAGNOSIS — G47.00 INSOMNIA, UNSPECIFIED TYPE: ICD-10-CM

## 2018-11-09 RX ORDER — TRAZODONE HYDROCHLORIDE 50 MG/1
TABLET ORAL
Qty: 30 TAB | Refills: 5 | Status: SHIPPED | OUTPATIENT
Start: 2018-11-09 | End: 2019-03-25 | Stop reason: SDUPTHER

## 2018-11-26 ENCOUNTER — PATIENT OUTREACH (OUTPATIENT)
Dept: FAMILY MEDICINE CLINIC | Age: 63
End: 2018-11-26

## 2019-02-08 RX ORDER — HYDROCHLOROTHIAZIDE 25 MG/1
25 TABLET ORAL DAILY
Qty: 30 TAB | Refills: 5 | Status: SHIPPED | OUTPATIENT
Start: 2019-02-08 | End: 2019-12-20 | Stop reason: SDUPTHER

## 2019-03-25 ENCOUNTER — OFFICE VISIT (OUTPATIENT)
Dept: FAMILY MEDICINE CLINIC | Age: 64
End: 2019-03-25

## 2019-03-25 VITALS
HEART RATE: 72 BPM | RESPIRATION RATE: 20 BRPM | WEIGHT: 143.6 LBS | TEMPERATURE: 98 F | SYSTOLIC BLOOD PRESSURE: 136 MMHG | BODY MASS INDEX: 23.93 KG/M2 | DIASTOLIC BLOOD PRESSURE: 81 MMHG | HEIGHT: 65 IN

## 2019-03-25 DIAGNOSIS — E55.9 VITAMIN D DEFICIENCY: ICD-10-CM

## 2019-03-25 DIAGNOSIS — E07.9 THYROID DISEASE: ICD-10-CM

## 2019-03-25 DIAGNOSIS — R11.0 NAUSEA: ICD-10-CM

## 2019-03-25 DIAGNOSIS — R15.9 INCONTINENCE OF FECES, UNSPECIFIED FECAL INCONTINENCE TYPE: ICD-10-CM

## 2019-03-25 DIAGNOSIS — D64.9 ANEMIA, UNSPECIFIED TYPE: ICD-10-CM

## 2019-03-25 DIAGNOSIS — I10 ESSENTIAL HYPERTENSION: Primary | ICD-10-CM

## 2019-03-25 DIAGNOSIS — G47.00 INSOMNIA, UNSPECIFIED TYPE: ICD-10-CM

## 2019-03-25 RX ORDER — ONDANSETRON HYDROCHLORIDE 8 MG/1
8 TABLET, FILM COATED ORAL
Qty: 30 TAB | Refills: 0 | Status: SHIPPED | OUTPATIENT
Start: 2019-03-25 | End: 2019-05-16 | Stop reason: SDUPTHER

## 2019-03-25 RX ORDER — TRAZODONE HYDROCHLORIDE 50 MG/1
50 TABLET ORAL
Qty: 30 TAB | Refills: 5 | Status: SHIPPED | OUTPATIENT
Start: 2019-03-25 | End: 2019-10-29 | Stop reason: SDUPTHER

## 2019-03-25 NOTE — PROGRESS NOTES
Chief Complaint Patient presents with  Bowel Incontinence Health Maintenance Due Topic Date Due  Shingrix Vaccine Age 50> (1 of 2) 01/29/2005  BREAST CANCER SCRN MAMMOGRAM  11/02/2017  MEDICARE YEARLY EXAM  03/14/2018  Influenza Age 5 to Adult  08/01/2018 Health Maintenance reviewed 1. Have you been to the ER, urgent care clinic since your last visit? Hospitalized since your last visit? No 
 
2. Have you seen or consulted any other health care providers outside of the [address] [place] [address] since your last visit? Include any pap smears or colon screening.  No

## 2019-03-25 NOTE — PROGRESS NOTES
Chief Complaint Patient presents with  Bowel Incontinence HPI Niki Chandler is a 59 y.o. female presenting today for follow up of htn, vit d def'cy, thyroid dz. Patient had labs on 5/16/19. Labs reviewed in detail with patient Patient does need medication refills today. New concerns today: pt c/o fecal incontinence for 4-5 months. It has been happening more often lately, 3-4 times per week. She does not have any pain. She does not have any sensation that she is moving her bowels. She does tend to have regular bowel movements. Review of Systems Constitutional: Negative. HENT: Negative. Respiratory: Negative. Cardiovascular: Negative. Gastrointestinal:  
     Fecal incontinence All other systems reviewed and are negative. Physical Exam 
Physical Exam  
Nursing note and vitals reviewed. Constitutional: She is oriented to person, place, and time. She appears well-developed and well-nourished. HENT:  
Head: Normocephalic and atraumatic. Right Ear: External ear normal.  
Left Ear: External ear normal.  
Nose: Nose normal.  
Eyes: Conjunctivae and EOM are normal.  
Neck: Normal range of motion. Neck supple. No JVD present. Carotid bruit is not present. No thyromegaly present. Cardiovascular: Normal rate, regular rhythm, normal heart sounds and intact distal pulses. Exam reveals no gallop and no friction rub. No murmur heard. Pulmonary/Chest: Effort normal and breath sounds normal. She has no wheezes. She has no rhonchi. She has no rales. Abdominal: Soft. Bowel sounds are normal.  
Musculoskeletal: Normal range of motion. Neurological: She is alert and oriented to person, place, and time. Coordination normal.  
Skin: Skin is warm and dry. Psychiatric: She has a normal mood and affect. Her behavior is normal. Judgment and thought content normal.  
 
Diagnoses and all orders for this visit: 1. Essential hypertension -     METABOLIC PANEL, COMPREHENSIVE; Future -     LIPID PANEL; Future Stable, cont pres tx plan. 2. Thyroid disease 
-     TSH 3RD GENERATION; Future 3. Vitamin D deficiency 
-     VITAMIN D, 25 HYDROXY; Future 4. Anemia, unspecified type 
-     CBC WITH AUTOMATED DIFF; Future 5. Incontinence of feces, unspecified fecal incontinence type 
-     REFERRAL TO COLON AND RECTAL SURGERY 6. Insomnia, unspecified type 
-     traZODone (DESYREL) 50 mg tablet; Take 1 Tab by mouth nightly as needed for Sleep. 7. Nausea 
-     ondansetron hcl (ZOFRAN) 8 mg tablet; Take 1 Tab by mouth every twelve (12) hours as needed for Nausea. Follow-up and Dispositions · Return in about 2 months (around 5/25/2019) for thyroid disease, vit D deficiency, anemia, high blood pressure.

## 2019-04-11 ENCOUNTER — OFFICE VISIT (OUTPATIENT)
Dept: SURGERY | Age: 64
End: 2019-04-11

## 2019-04-11 VITALS
TEMPERATURE: 98.3 F | SYSTOLIC BLOOD PRESSURE: 128 MMHG | HEIGHT: 65 IN | DIASTOLIC BLOOD PRESSURE: 68 MMHG | BODY MASS INDEX: 24.66 KG/M2 | RESPIRATION RATE: 18 BRPM | OXYGEN SATURATION: 100 % | HEART RATE: 68 BPM | WEIGHT: 148 LBS

## 2019-04-11 DIAGNOSIS — R15.9 INCONTINENCE OF FECES, UNSPECIFIED FECAL INCONTINENCE TYPE: Primary | ICD-10-CM

## 2019-04-11 NOTE — PATIENT INSTRUCTIONS
Continue immodium but take daily start citrucel powder one adult dose daily and continue high fiber diet

## 2019-04-11 NOTE — LETTER
4/11/19 Patient: Luis Mendez YOB: 1955 Date of Visit: 4/11/2019 Gracie Velazquez MD 
[address] [id number] [address] [address] VIA In Basket Dear Eleanor Blount, I saw See Jhaveri in the office today regarding issues with fecal smearing. She notes feces in the perianal area when urinating. She denies carlos fecal incontinence. Her rectal exam is normal aside from some slightly decreased rectal tone. Her last colonoscopy in 2017 was unremarkable. I have advised a fiber supplement and Imodium to see if solidifying her stool will help with her control. If her symptoms persist I will likely refer her to biofeedback therapy. She will follow-up in the office in 1 month. If you have questions, please do not hesitate to call me. I look forward to following your patient along with you. Sincerely, Danial Monge MD

## 2019-04-11 NOTE — PROGRESS NOTES
HPI: Carly Scott is a 59 y.o. female presenting with chief complain of fecal incontinence. This has been going on for the last 4 months. She finds this in the perianal area with urination. It is not in her underwear. It is pasty and texture. She moves her bowels 2-3 times per day. She denies constipation or diarrhea. She has nausea and vomiting issues with esophageal disease. She is on a bowel regimen. She denies blood per rectum. She had a colonoscopy in 2017 by [facility] which was negative with a 5-year recall. She denies previous issues or current issues with fecal incontinence.     Past Medical History:   Diagnosis Date    Anxiety disorder     Arthritis     Cervical dystonia 9/5/2014    Cervical pain     Cervicogenic headache 9/5/2014    Chronic pain     knee    DDD (degenerative disc disease), cervical 9/5/2014    Essential hypertension     Fibrillation, atrial (HCC)     Fibromyalgia     GERD (gastroesophageal reflux disease)     \"malfunctioning esophagus\"    Headache(784.0)     Hepatic steatosis     Herniated disc, cervical     Hypercholesterolemia     Hypertension     Joint pain     JRA (juvenile rheumatoid arthritis) ([facility] [address]     Muscle pain     Musculoskeletal pain 9/5/2014    Numbness and tingling of left arm and leg     Status post cervical spinal fusion 9/5/2014    Thyroid disease     Vision decreased        Past Surgical History:   Procedure Laterality Date    COLONOSCOPY N/A 4/18/2017    COLONOSCOPY, DIAGNOSTIC performed by Emely Nascimento MD at [facility] [address]  5/21/13    C3-4 ACDF     HX COLONOSCOPY      HX GASTRIC BYPASS  02/03/10    HX LUMBAR LAMINECTOMY      HX ORTHOPAEDIC  2013    Spinal SurgeryC-2 AND C-3    HX OTHER SURGICAL      GIST tumor resected    HX OTHER SURGICAL      teeth extractions    HX PARTIAL HYSTERECTOMY  1988    NV EXCISION TUMOR SOFT TISS FACE/SCALP SUBQ < 2CM N/A 09/30/2016    Dr. Anna Kaye History   Problem Relation Age of Onset    Hypertension Mother     Heart Failure Mother     Hypertension Father     Arthritis-osteo Father        Social History     Socioeconomic History    Marital status:      Spouse name: Not on file    Number of children: Not on file    Years of education: Not on file    Highest education level: Not on file   Occupational History    Occupation: disabled     Employer: disability   Tobacco Use    Smoking status: Former Smoker     Types: Cigarettes     Last attempt to quit: 1989     Years since quittin.2    Smokeless tobacco: Never Used   Substance and Sexual Activity    Alcohol use: No    Drug use: Yes     Types: Prescription, OTC       Review of Systems - Review of Systems   Constitutional: Positive for malaise/fatigue. Negative for diaphoresis and fever. HENT: Negative. Eyes: Negative. Respiratory: Negative. Cardiovascular: Positive for leg swelling. Negative for chest pain, palpitations, orthopnea, claudication and PND. Gastrointestinal: Positive for abdominal pain, diarrhea, nausea and vomiting. Negative for blood in stool, constipation, heartburn and melena. Genitourinary: Negative. Musculoskeletal: Positive for back pain, joint pain, myalgias and neck pain. Negative for falls. Skin: Negative. Neurological: Positive for tingling. Negative for dizziness, tremors, sensory change, speech change, focal weakness, seizures, loss of consciousness, weakness and headaches. Endo/Heme/Allergies: Negative. Psychiatric/Behavioral: Negative. Outpatient Medications Marked as Taking for the 19 encounter (Office Visit) with Morenita Slade MD   Medication Sig Dispense Refill    ondansetron hcl (ZOFRAN) 8 mg tablet Take 1 Tab by mouth every twelve (12) hours as needed for Nausea. 30 Tab 0    traZODone (DESYREL) 50 mg tablet Take 1 Tab by mouth nightly as needed for Sleep.  30 Tab 5    hydroCHLOROthiazide (HYDRODIURIL) 25 mg tablet Take 1 Tab by mouth daily. Appointment required before further refills will be authorized. 30 Tab 5    oxyCODONE-acetaminophen (PERCOCET 10)  mg per tablet       tiZANidine (ZANAFLEX) 4 mg tablet   0    dicyclomine (BENTYL) 10 mg capsule   0    omeprazole (PRILOSEC) 40 mg capsule Take 1 Cap by mouth two (2) times a day. 60 Cap 5    aspirin delayed-release 81 mg tablet Take  by mouth daily.  CYANOCOBALAMIN, VITAMIN B-12, (VITAMIN B-12 PO) Take 1 tablet by mouth as needed. Allergies   Allergen Reactions    Codeine Nausea and Vomiting    Pcn [Penicillins] Not Reported This Time and Hives    Tramadol Palpitations    Vicodin [Hydrocodone-Acetaminophen] Itching and Hives       Vitals:    04/11/19 1451   BP: 128/68   Pulse: 68   Resp: 18   Temp: 98.3 °F (36.8 °C)   SpO2: 100%   Weight: 67.1 kg (148 lb)   Height: 5' 5\" (1.651 m)   PainSc:   6   PainLoc: Generalized   LMP: 12/30/1988       Physical Exam   Constitutional: She appears well-developed and well-nourished. HENT:   Head: Normocephalic and atraumatic. Eyes: Conjunctivae and EOM are normal.   Abdominal: Soft. She exhibits no distension. There is no tenderness. Musculoskeletal: Normal range of motion. Lymphadenopathy:     She has no cervical adenopathy. Right: No inguinal adenopathy present. Left: No inguinal adenopathy present. Neurological: She exhibits normal muscle tone. Skin: Skin is warm and dry. No rash noted. Psychiatric: She has a normal mood and affect.  Her speech is normal.   Rectum: No significant external hemorrhoids or fissure  Digital rectal exam: Decreased rectal tone, no mass    Colonoscopy report reviewed by Dr. Gaitan Friday in 2017, negative with 5-year recall    Assessment / Plan    Fecal smearing  Recommend fiber supplement and Imodium daily  Follow-up in a month  If continued issues will consider referral to biofeedback therapy    The diagnoses and plan were discussed with the patient. All questions answered. Plan of care agreed to by all concerned.

## 2019-05-16 DIAGNOSIS — R11.0 NAUSEA: ICD-10-CM

## 2019-05-16 RX ORDER — ONDANSETRON HYDROCHLORIDE 8 MG/1
TABLET, FILM COATED ORAL
Qty: 30 TAB | Refills: 0 | Status: SHIPPED | OUTPATIENT
Start: 2019-05-16 | End: 2019-09-12 | Stop reason: SDUPTHER

## 2019-06-04 ENCOUNTER — TELEPHONE (OUTPATIENT)
Dept: FAMILY MEDICINE CLINIC | Age: 64
End: 2019-06-04

## 2019-06-04 ENCOUNTER — OFFICE VISIT (OUTPATIENT)
Dept: FAMILY MEDICINE CLINIC | Age: 64
End: 2019-06-04

## 2019-06-04 VITALS
HEART RATE: 72 BPM | HEIGHT: 65 IN | OXYGEN SATURATION: 100 % | BODY MASS INDEX: 23.56 KG/M2 | WEIGHT: 141.4 LBS | SYSTOLIC BLOOD PRESSURE: 169 MMHG | DIASTOLIC BLOOD PRESSURE: 82 MMHG | RESPIRATION RATE: 18 BRPM | TEMPERATURE: 97.9 F

## 2019-06-04 DIAGNOSIS — R53.81 MALAISE: ICD-10-CM

## 2019-06-04 DIAGNOSIS — R11.2 NON-INTRACTABLE VOMITING WITH NAUSEA, UNSPECIFIED VOMITING TYPE: ICD-10-CM

## 2019-06-04 DIAGNOSIS — R10.84 GENERALIZED ABDOMINAL PAIN: ICD-10-CM

## 2019-06-04 DIAGNOSIS — E07.9 THYROID DISEASE: ICD-10-CM

## 2019-06-04 DIAGNOSIS — R07.89 OTHER CHEST PAIN: Primary | ICD-10-CM

## 2019-06-04 DIAGNOSIS — G44.89 OTHER HEADACHE SYNDROME: ICD-10-CM

## 2019-06-04 DIAGNOSIS — I10 HYPERTENSION, ESSENTIAL: ICD-10-CM

## 2019-06-04 LAB
BILIRUB UR QL STRIP: NEGATIVE
GLUCOSE UR-MCNC: NEGATIVE MG/DL
KETONES P FAST UR STRIP-MCNC: NEGATIVE MG/DL
PH UR STRIP: 6 [PH] (ref 4.6–8)
PROT UR QL STRIP: NEGATIVE
SP GR UR STRIP: 1 (ref 1–1.03)
UA UROBILINOGEN AMB POC: NORMAL (ref 0.2–1)
URINALYSIS CLARITY POC: CLEAR
URINALYSIS COLOR POC: YELLOW
URINE BLOOD POC: NEGATIVE
URINE LEUKOCYTES POC: NEGATIVE
URINE NITRITES POC: NEGATIVE

## 2019-06-04 RX ORDER — LEVOTHYROXINE SODIUM 75 UG/1
75 TABLET ORAL
Qty: 30 TAB | Refills: 5 | Status: CANCELLED | OUTPATIENT
Start: 2019-06-04

## 2019-06-04 RX ORDER — TIZANIDINE 4 MG/1
TABLET ORAL
Refills: 0 | Status: CANCELLED | OUTPATIENT
Start: 2019-06-04

## 2019-06-04 NOTE — PROGRESS NOTES
Juliano Osman presents today for   Chief Complaint   Patient presents with    Hypertension     x 2 weeks; BP has been extremely elevated; 191/104; 207/109 last night    Nausea     x1 week; cant keep anything down except liquids; diarrhea and vomiting at the same time    Ear Pain     x 2 weeks    Head Pain     Not sure if its sinus; head feels like its pounding    Sore Throat     tongue sore; been gargling;patient  white pus bumps noted to back of throat; temp last week    Chest Pain     X 1 1/2 week; fades and come back; piercing pain       Is someone accompanying this pt? No    Is the patient using any DME equipment during OV? No    Depression Screening:  3 most recent PHQ Screens 3/25/2019   Little interest or pleasure in doing things Not at all   Feeling down, depressed, irritable, or hopeless Not at all   Total Score PHQ 2 0       Learning Assessment:  Learning Assessment 6/4/2019   PRIMARY LEARNER Patient   HIGHEST LEVEL OF EDUCATION - PRIMARY LEARNER  SOME COLLEGE   BARRIERS PRIMARY LEARNER NONE   CO-LEARNER CAREGIVER No   PRIMARY LANGUAGE ENGLISH   LEARNER PREFERENCE PRIMARY LISTENING     -     -     -     -   ANSWERED BY Self   RELATIONSHIP SELF       Abuse Screening:  Abuse Screening Questionnaire 3/25/2019   Do you ever feel afraid of your partner? N   Are you in a relationship with someone who physically or mentally threatens you? N   Is it safe for you to go home? Y       Fall Screening:  No flowsheet data found. Health Maintenance Due   Topic Date Due    DTaP/Tdap/Td series (1 - Tdap) 01/29/1976    Shingrix Vaccine Age 50> (1 of 2) 01/29/2005    BREAST CANCER SCRN MAMMOGRAM  11/02/2017    MEDICARE YEARLY EXAM  03/14/2018   . Health Maintenance reviewed and discussed and ordered per Provider. Juliano Osman is updated on all       Coordination of Care:  1. Have you been to the ER, urgent care clinic since your last visit? Hospitalized since your last visit? No    2.  Have you seen or consulted any other health care providers outside of the [address] [place] [address] since your last visit? Include any pap smears or colon screening. No        Advance Directive:  1. Do you have an advance directive in place? Patient Reply:No    2. If not, would you like material regarding how to put one in place?  Patient Reply: No

## 2019-06-04 NOTE — PROGRESS NOTES
HPI  Pt reports that over the past month she has been feeling ill. Worsening over the past two weeks. Reports bilateral earaches, sore throat, severe headache, dry cough. Says her temp has been up to 107. Says she hasn't seen it under 100. Chest pain- piercing and pressure. Comes and goes. Says, \"I feel like I have to burp\"    Nausea, vomiting, slight diarrhea. Stinging abdominal pain. No blood in stool or vomit. Says she has not had one meal in the past week because she vomits everything back up. Says she can't keep down more than a few sips of water at a time. Reports hx of gastric bypass \"a long time ago\"    Also concerned that her blood pressure has been extremely elevated.   Says that it has been as high as systolic over 768G    Past Medical History  Past Medical History:   Diagnosis Date    Anxiety disorder     Arthritis     Cervical dystonia 9/5/2014    Cervical pain     Cervicogenic headache 9/5/2014    Chronic pain     knee    DDD (degenerative disc disease), cervical 9/5/2014    Essential hypertension     Fibrillation, atrial (HCC)     Fibromyalgia     GERD (gastroesophageal reflux disease)     \"malfunctioning esophagus\"    Headache(784.0)     Hepatic steatosis     Herniated disc, cervical     Hypercholesterolemia     Hypertension     Joint pain     JRA (juvenile rheumatoid arthritis) ([facility] [address]     Muscle pain     Musculoskeletal pain 9/5/2014    Non-intractable vomiting with nausea 6/4/2019    Numbness and tingling of left arm and leg     Status post cervical spinal fusion 9/5/2014    Thyroid disease     Vision decreased        Surgical History  Past Surgical History:   Procedure Laterality Date    COLONOSCOPY N/A 4/18/2017    COLONOSCOPY, DIAGNOSTIC performed by Teddy Romo MD at [address] [place] [address] HX CERVICAL LAMINECTOMY  5/21/13    C3-4 ACDF     HX COLONOSCOPY      HX GASTRIC BYPASS  02/03/10    HX LUMBAR LAMINECTOMY      HX ORTHOPAEDIC  2013    Spinal SurgeryC-2 AND C-3    HX OTHER SURGICAL      GIST tumor resected    HX OTHER SURGICAL      teeth extractions    HX PARTIAL HYSTERECTOMY  1988    [place] EXCISION TUMOR SOFT TISS FACE/SCALP SUBQ < 2CM N/A 09/30/2016    Dr. Tracie Don        Medications  Current Outpatient Medications   Medication Sig Dispense Refill    ondansetron hcl (ZOFRAN) 8 mg tablet take 1 tablet by mouth every 12 hours if needed 30 Tab 0    traZODone (DESYREL) 50 mg tablet Take 1 Tab by mouth nightly as needed for Sleep. 30 Tab 5    hydroCHLOROthiazide (HYDRODIURIL) 25 mg tablet Take 1 Tab by mouth daily. Appointment required before further refills will be authorized. 30 Tab 5    oxyCODONE-acetaminophen (PERCOCET 10)  mg per tablet       tiZANidine (ZANAFLEX) 4 mg tablet   0    dicyclomine (BENTYL) 10 mg capsule   0    omeprazole (PRILOSEC) 40 mg capsule Take 1 Cap by mouth two (2) times a day. 60 Cap 5    CYANOCOBALAMIN, VITAMIN B-12, (VITAMIN B-12 PO) Take 1 tablet by mouth as needed.  LORazepam (ATIVAN) 0.5 mg tablet take 1 tablet by mouth twice a day if needed 60 Tab 0    naloxone (NARCAN) 1 mg/mL injection 1 mL by IntraMUSCular route once as needed for up to 1 dose. 1 Syringe 0    aspirin delayed-release 81 mg tablet Take  by mouth daily.  levothyroxine (SYNTHROID) 75 mcg tablet Take 1 Tab by mouth Daily (before breakfast).  30 Tab 5       Allergies  Allergies   Allergen Reactions    Codeine Nausea and Vomiting    Pcn [Penicillins] Not Reported This Time and Hives    Tramadol Palpitations    Vicodin [Hydrocodone-Acetaminophen] Itching and Hives       Family History  Family History   Problem Relation Age of Onset    Hypertension Mother     Heart Failure Mother     Hypertension Father     Arthritis-osteo Father        Social History  Social History     Socioeconomic History    Marital status:      Spouse name: Not on file    Number of children: Not on file    Years of education: Not on file   [address] [place] [address] Highest education level: Not on file   Occupational History    Occupation: disabled     Employer: disability   Social Needs    Financial resource strain: Not on file    Food insecurity:     Worry: Not on file     Inability: Not on file    Transportation needs:     Medical: Not on file     Non-medical: Not on file   Tobacco Use    Smoking status: Former Smoker     Types: Cigarettes     Last attempt to quit: 1989     Years since quittin.4    Smokeless tobacco: Never Used   Substance and Sexual Activity    Alcohol use: No    Drug use: Yes     Types: Prescription, OTC    Sexual activity: Not on file   Lifestyle    Physical activity:     Days per week: Not on file     Minutes per session: Not on file    Stress: Not on file   Relationships    Social connections:     Talks on phone: Not on file     Gets together: Not on file     Attends [employer] service: Not on file     Active member of club or organization: Not on file     Attends meetings of clubs or organizations: Not on file     Relationship status: Not on file    Intimate partner violence:     Fear of current or ex partner: Not on file     Emotionally abused: Not on file     Physically abused: Not on file     Forced sexual activity: Not on file   Other Topics Concern    Not on file   Social History Narrative    Not on file       Problem List  Patient Active Problem List   Diagnosis Code    S/P gastric bypass - date of surgery 02/10 Z98.84    Cervical disc disease with myelopathy M50.00    S/P cervical discectomy Z98.890    Status post cervical spinal arthrodesis Z98.1    Chronic pain syndrome G89.4    Postlaminectomy syndrome, cervical region M96.1    Myalgia and myositis, unspecified [id number]    Encounter for postoperative care Z48.89    Thyroid disease E07.9    Essential hypertension I10    Hypercholesterolemia E78.00    Musculoskeletal pain M79.18    DDD (degenerative disc disease), cervical M50.30    Status post cervical spinal fusion Z98.1    Cervicogenic headache R51    Cervical dystonia G24.3    Neck pain M54.2    Esophageal dysfunction K22.4    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 HENT: Positive for ear pain and sore throat. Respiratory: Positive for cough. Cardiovascular: Positive for chest pain. Gastrointestinal: Positive for abdominal pain, diarrhea, nausea and vomiting. Negative for blood in stool and melena. Musculoskeletal: Positive for myalgias. Neurological: Positive for headaches. Vital Signs  Vitals:    06/04/19 1157   BP: 169/82   Pulse: 72   Resp: 18   Temp: 97.9 °F (36.6 °C)   TempSrc: Oral   SpO2: 100%   Weight: 141 lb 6.4 oz (64.1 kg)   Height: 5' 5\" (1.651 m)   PainSc:  10 - Worst pain ever   PainLoc: Generalized   LMP: 12/30/1988       Physical Exam  Physical Exam   Constitutional: She is oriented to person, place, and time. HENT:   Right Ear: Tympanic membrane, external ear and ear canal normal.   Left Ear: Tympanic membrane, external ear and ear canal normal.   Nose: Right sinus exhibits maxillary sinus tenderness and frontal sinus tenderness. Left sinus exhibits maxillary sinus tenderness and frontal sinus tenderness. Mouth/Throat: Uvula is midline, oropharynx is clear and moist and mucous membranes are normal.   Cardiovascular: Normal rate, regular rhythm and normal heart sounds. Pulmonary/Chest: Effort normal and breath sounds normal.   Abdominal: Soft. Bowel sounds are normal. There is generalized tenderness.  There is no rigidity, no rebound and no guarding. Neurological: She is alert and oriented to person, place, and time. Skin: Skin is warm and dry. Nursing note and vitals reviewed. Diagnostics  Orders Placed This Encounter    AMB POC EKG ROUTINE W/ 12 LEADS, INTER & REP     Order Specific Question:   Reason for Exam:     Answer:   chest pain       Results  Results for orders placed or performed during the hospital encounter of 05/16/18   COMPLIANCE DRUG SCREEN/PRESCRIPTION MONITORING   Result Value Ref Range    Summary FINAL    METABOLIC PANEL, COMPREHENSIVE   Result Value Ref Range    Sodium 143 136 - 145 mmol/L    Potassium 5.1 3.5 - 5.5 mmol/L    Chloride 110 (H) 100 - 108 mmol/L    CO2 21 21 - 32 mmol/L    Anion gap 12 3.0 - 18 mmol/L    Glucose 80 74 - 99 mg/dL    BUN 9 7.0 - 18 MG/DL    Creatinine 1.09 0.6 - 1.3 MG/DL    BUN/Creatinine ratio 8 (L) 12 - 20      GFR est AA >60 >60 ml/min/1.73m2    GFR est non-AA 51 (L) >60 ml/min/1.73m2    Calcium 9.5 8.5 - 10.1 MG/DL    Bilirubin, total 0.3 0.2 - 1.0 MG/DL    ALT (SGPT) 45 13 - 56 U/L    AST (SGOT) 55 (H) 15 - 37 U/L    Alk.  phosphatase 140 (H) 45 - 117 U/L    Protein, total 7.9 6.4 - 8.2 g/dL    Albumin 4.5 3.4 - 5.0 g/dL    Globulin 3.4 2.0 - 4.0 g/dL    A-G Ratio 1.3 0.8 - 1.7     LIPID PANEL   Result Value Ref Range    LIPID PROFILE          Cholesterol, total 207 (H) <200 MG/DL    Triglyceride 97 <150 MG/DL    HDL Cholesterol 121 (H) 40 - 60 MG/DL    LDL, calculated 66.6 0 - 100 MG/DL    VLDL, calculated 19.4 MG/DL    CHOL/HDL Ratio 1.7 0 - 5.0     TSH 3RD GENERATION   Result Value Ref Range    TSH 11.10 (H) 0.36 - 3.74 uIU/mL   VITAMIN B12 & FOLATE   Result Value Ref Range    Vitamin B12 1,421 (H) 211 - 911 pg/mL    Folate 13.0 3.10 - 17.50 ng/mL   VITAMIN D, 25 HYDROXY   Result Value Ref Range    Vitamin D 25-Hydroxy 4.4 (L) 30 - 100 ng/mL   IRON PROFILE   Result Value Ref Range    Iron 30 (L) 50 - 175 ug/dL    TIBC 488 (H) 250 - 450 ug/dL    Iron % saturation 6 %   VITAMIN B1, WHOLE BLOOD   Result Value Ref Range    Vitamin B1 107.8 66.5 - 200.0 nmol/L   CBC WITH AUTOMATED DIFF   Result Value Ref Range    WBC 10.2 4.6 - 13.2 K/uL    RBC 3.87 (L) 4.20 - 5.30 M/uL    HGB 11.0 (L) 12.0 - 16.0 g/dL    HCT 37.3 35.0 - 45.0 %    MCV 96.4 74.0 - 97.0 FL    MCH 28.4 24.0 - 34.0 PG    MCHC 29.5 (L) 31.0 - 37.0 g/dL    RDW 15.7 (H) 11.6 - 14.5 %    PLATELET 921 224 - 176 K/uL    MPV 10.9 9.2 - 11.8 FL    NEUTROPHILS 62 40 - 73 %    LYMPHOCYTES 29 21 - 52 %    MONOCYTES 8 3 - 10 %    EOSINOPHILS 1 0 - 5 %    BASOPHILS 0 0 - 2 %    ABS. NEUTROPHILS 6.3 1.8 - 8.0 K/UL    ABS. LYMPHOCYTES 3.0 0.9 - 3.6 K/UL    ABS. MONOCYTES 0.8 0.05 - 1.2 K/UL    ABS. EOSINOPHILS 0.1 0.0 - 0.4 K/UL    ABS. BASOPHILS 0.0 0.0 - 0.06 K/UL    DF AUTOMATED           Assessment and Plan  Diagnoses and all orders for this visit:    1. Other chest pain  -     AMB POC EKG ROUTINE W/ 12 LEADS, INTER & REP   unable to lie still long enough to get good tracing with EKG    2. Generalized abdominal pain    3. Other headache syndrome    4. Non-intractable vomiting with nausea, unspecified vomiting type    5. Hypertension, essential    6. Malaise    7. Thyroid disease- daughter reports that pt has not taken her thyroid medication in over a year    Discussed with pt and her daughter that with the symptoms that the patient is presenting with- severe headache, chest pain, nausea, vomiting with inability to keep food down X 1 wek, abdominal pain- that she needs to go to the ER now for further evaluation. Discussed that while her symptoms could be related to multiple possible reasons, she needed to go get checked out as she is verbalizing feeling so poorly. Pt and daughter agreeable to this plan and daughter is agreeable to taking her there now. Encouraged pt to schedule appt to follow up with PCP after ER visit. After care summary printed and reviewed with patient. Plan reviewed with patient.  Questions answered. Patient verbalized understanding of plan and is in agreement with plan.       Elvira Hernández, FNP-C

## 2019-06-05 NOTE — TELEPHONE ENCOUNTER
Please call this patient and let her know that I saw that she went to the ER and hope she is feeling better today. Please remind her that Dr Dhara Saldivar put in orders for blood work including her thyroid levels.   She really needs to get that drawn ASAP and make an appointment to follow up on that ASAP with either Dr Dhara Saldivar or myself

## 2019-06-14 ENCOUNTER — TELEPHONE (OUTPATIENT)
Dept: FAMILY MEDICINE CLINIC | Age: 64
End: 2019-06-14

## 2019-06-17 ENCOUNTER — HOSPITAL ENCOUNTER (OUTPATIENT)
Dept: LAB | Age: 64
Discharge: HOME | End: 2019-06-17

## 2019-06-17 LAB — XX-LABCORP SPECIMEN COL,LCBCF: NORMAL

## 2019-06-17 PROCEDURE — 99001 SPECIMEN HANDLING PT-LAB: CPT

## 2019-06-18 LAB
25(OH)D3+25(OH)D2 SERPL-MCNC: 6.6 NG/ML (ref 30–100)
ALBUMIN SERPL-MCNC: 4.2 G/DL (ref 3.6–4.8)
ALBUMIN/GLOB SERPL: 1.5 {RATIO} (ref 1.2–2.2)
ALP SERPL-CCNC: 87 IU/L (ref 39–117)
ALT SERPL-CCNC: 12 IU/L (ref 0–32)
AST SERPL-CCNC: 28 IU/L (ref 0–40)
BASOPHILS # BLD AUTO: 0 X10E3/UL (ref 0–0.2)
BASOPHILS NFR BLD AUTO: 1 %
BILIRUB SERPL-MCNC: 0.3 MG/DL (ref 0–1.2)
BUN SERPL-MCNC: 8 MG/DL (ref 8–27)
BUN/CREAT SERPL: 11 (ref 12–28)
CALCIUM SERPL-MCNC: 8.9 MG/DL (ref 8.7–10.3)
CHLORIDE SERPL-SCNC: 108 MMOL/L (ref 96–106)
CHOLEST SERPL-MCNC: 195 MG/DL (ref 100–199)
CO2 SERPL-SCNC: 21 MMOL/L (ref 20–29)
CREAT SERPL-MCNC: 0.74 MG/DL (ref 0.57–1)
EOSINOPHIL # BLD AUTO: 0.1 X10E3/UL (ref 0–0.4)
EOSINOPHIL NFR BLD AUTO: 2 %
ERYTHROCYTE [DISTWIDTH] IN BLOOD BY AUTOMATED COUNT: 16.5 % (ref 12.3–15.4)
GLOBULIN SER CALC-MCNC: 2.8 G/DL (ref 1.5–4.5)
GLUCOSE SERPL-MCNC: 82 MG/DL (ref 65–99)
HCT VFR BLD AUTO: 34.1 % (ref 34–46.6)
HDLC SERPL-MCNC: 108 MG/DL
HGB BLD-MCNC: 10.8 G/DL (ref 11.1–15.9)
IMM GRANULOCYTES # BLD AUTO: 0 X10E3/UL (ref 0–0.1)
IMM GRANULOCYTES NFR BLD AUTO: 0 %
INTERPRETATION, 910389: NORMAL
LDLC SERPL CALC-MCNC: 67 MG/DL (ref 0–99)
LYMPHOCYTES # BLD AUTO: 2.4 X10E3/UL (ref 0.7–3.1)
LYMPHOCYTES NFR BLD AUTO: 40 %
MCH RBC QN AUTO: 28.6 PG (ref 26.6–33)
MCHC RBC AUTO-ENTMCNC: 31.7 G/DL (ref 31.5–35.7)
MCV RBC AUTO: 90 FL (ref 79–97)
MONOCYTES # BLD AUTO: 0.4 X10E3/UL (ref 0.1–0.9)
MONOCYTES NFR BLD AUTO: 7 %
NEUTROPHILS # BLD AUTO: 3.1 X10E3/UL (ref 1.4–7)
NEUTROPHILS NFR BLD AUTO: 50 %
PLATELET # BLD AUTO: 271 X10E3/UL (ref 150–450)
POTASSIUM SERPL-SCNC: 3.9 MMOL/L (ref 3.5–5.2)
PROT SERPL-MCNC: 7 G/DL (ref 6–8.5)
RBC # BLD AUTO: 3.78 X10E6/UL (ref 3.77–5.28)
SODIUM SERPL-SCNC: 144 MMOL/L (ref 134–144)
SPECIMEN STATUS REPORT, ROLRST: NORMAL
TRIGL SERPL-MCNC: 100 MG/DL (ref 0–149)
TSH SERPL DL<=0.005 MIU/L-ACNC: 4.02 UIU/ML (ref 0.45–4.5)
VLDLC SERPL CALC-MCNC: 20 MG/DL (ref 5–40)
WBC # BLD AUTO: 6.1 X10E3/UL (ref 3.4–10.8)

## 2019-07-08 ENCOUNTER — OFFICE VISIT (OUTPATIENT)
Dept: FAMILY MEDICINE CLINIC | Age: 64
End: 2019-07-08

## 2019-07-08 VITALS
DIASTOLIC BLOOD PRESSURE: 79 MMHG | OXYGEN SATURATION: 100 % | WEIGHT: 135.4 LBS | BODY MASS INDEX: 22.56 KG/M2 | SYSTOLIC BLOOD PRESSURE: 128 MMHG | HEIGHT: 65 IN | HEART RATE: 83 BPM | RESPIRATION RATE: 18 BRPM | TEMPERATURE: 98.2 F

## 2019-07-08 DIAGNOSIS — R11.2 NON-INTRACTABLE VOMITING WITH NAUSEA, UNSPECIFIED VOMITING TYPE: ICD-10-CM

## 2019-07-08 DIAGNOSIS — Z23 ENCOUNTER FOR IMMUNIZATION: ICD-10-CM

## 2019-07-08 DIAGNOSIS — Z11.1 SCREENING EXAMINATION FOR PULMONARY TUBERCULOSIS: ICD-10-CM

## 2019-07-08 DIAGNOSIS — R51.9 FREQUENT HEADACHES: Primary | ICD-10-CM

## 2019-07-08 DIAGNOSIS — E07.9 THYROID DISEASE: ICD-10-CM

## 2019-07-08 DIAGNOSIS — K21.9 GASTROESOPHAGEAL REFLUX DISEASE WITHOUT ESOPHAGITIS: ICD-10-CM

## 2019-07-08 DIAGNOSIS — R10.84 GENERALIZED ABDOMINAL PAIN: ICD-10-CM

## 2019-07-08 PROBLEM — R11.0 NAUSEA: Status: ACTIVE | Noted: 2019-07-08

## 2019-07-08 LAB
MM INDURATION POC: 0 MM (ref 0–5)
PPD POC: NEGATIVE NEGATIVE

## 2019-07-08 RX ORDER — PROMETHAZINE HYDROCHLORIDE 12.5 MG/1
12.5 TABLET ORAL
Qty: 30 TAB | Refills: 0 | Status: SHIPPED | OUTPATIENT
Start: 2019-07-08 | End: 2019-08-16 | Stop reason: SDUPTHER

## 2019-07-08 RX ORDER — TRAZODONE HYDROCHLORIDE 50 MG/1
50 TABLET ORAL
Qty: 30 TAB | Refills: 5 | Status: CANCELLED | OUTPATIENT
Start: 2019-07-08

## 2019-07-08 RX ORDER — PROMETHAZINE HYDROCHLORIDE 12.5 MG/1
12.5 TABLET ORAL
COMMUNITY
Start: 2019-06-04 | End: 2019-07-08 | Stop reason: SDUPTHER

## 2019-07-08 RX ORDER — LEVOTHYROXINE SODIUM 75 UG/1
75 TABLET ORAL
Qty: 30 TAB | Refills: 5 | Status: CANCELLED | OUTPATIENT
Start: 2019-07-08

## 2019-07-08 RX ORDER — ONDANSETRON HYDROCHLORIDE 8 MG/1
TABLET, FILM COATED ORAL
Qty: 30 TAB | Refills: 0 | Status: CANCELLED | OUTPATIENT
Start: 2019-07-08

## 2019-07-08 RX ORDER — CYCLOBENZAPRINE HCL 5 MG
5 TABLET ORAL
Qty: 45 TAB | Refills: 0 | Status: SHIPPED | OUTPATIENT
Start: 2019-07-08 | End: 2020-02-03 | Stop reason: ALTCHOICE

## 2019-07-08 NOTE — PROGRESS NOTES
PPD Placement note  Christophe Mckeon, 59 y.o. female is here today for placement of PPD test  Reason for PPD test: Employment  Pt taken PPD test before: yes  Verified in allergy area and with patient that they are not allergic to the products PPD is made of (Phenol or Tween). No:   Is patient taking any oral or IV steroid medication now or have they taken it in the last month? no  Has the patient ever received the BCG vaccine?: no  Has the patient been in recent contact with anyone known or suspected of having active TB disease?: no       Date of exposure (if applicable):N/A       Name of person they were exposed to (if applicable): N/A  Patient's Country of origin?: [place]  O: Alert and oriented in NAD. P:  PPD placed on 7/8/2019 left forearm. Patient advised to return for reading within 48-72 hours.

## 2019-07-08 NOTE — PROGRESS NOTES
Dong Phan presents today for    Chief Complaint   Patient presents with    Labs     Follow Up    Chest Pain     Follow Up    Medication Refill       Is someone accompanying this pt? No    Is the patient using any DME equipment during OV? No    Depression Screening:  3 most recent PHQ Screens 3/25/2019   Little interest or pleasure in doing things Not at all   Feeling down, depressed, irritable, or hopeless Not at all   Total Score PHQ 2 0       Learning Assessment:  Learning Assessment 6/4/2019   PRIMARY LEARNER Patient   HIGHEST LEVEL OF EDUCATION - PRIMARY LEARNER  SOME COLLEGE   BARRIERS PRIMARY LEARNER NONE   CO-LEARNER CAREGIVER No   PRIMARY LANGUAGE ENGLISH   LEARNER PREFERENCE PRIMARY LISTENING     -     -     -     -   ANSWERED BY Self   RELATIONSHIP SELF       Abuse Screening:  Abuse Screening Questionnaire 3/25/2019   Do you ever feel afraid of your partner? N   Are you in a relationship with someone who physically or mentally threatens you? N   Is it safe for you to go home? Y       Fall Screening:  No flowsheet data found. Health Maintenance Due   Topic Date Due    DTaP/Tdap/Td series (1 - Tdap) 01/29/1976    Shingrix Vaccine Age 50> (1 of 2) 01/29/2005    BREAST CANCER SCRN MAMMOGRAM  11/02/2017    MEDICARE YEARLY EXAM  03/14/2018   . Health Maintenance reviewed and discussed and ordered per Provider. Dong Phan is updated on all       Coordination of Care:  1. Have you been to the ER, urgent care clinic since your last visit? Hospitalized since your last visit? Yes; Sent by provider    2. Have you seen or consulted any other health care providers outside of the [address] [place] [address] since your last visit? Include any pap smears or colon screening. No    Advance Directive:  1. Do you have an advance directive in place? Patient Reply:No    2. If not, would you like material regarding how to put one in place?  Patient Reply: No

## 2019-07-08 NOTE — PROGRESS NOTES
HPI  Pt presents for follow up. Thought that she had appointment with Dr Shadia Hines today but did not but was able to be seen by this provider. .    Reports that she is still having headaches. Said that headaches affect her vision and she feels like the room is spinning and she just has to lie down. Reports that pain can go up as high as 7-8 on scale of 1-10. Says that she takes Alleve at times but not often. Says Tylenol hurts her stomach. Says she has rx for Percocet but that doesn't help her. Says she has Zanaflex but it doesn't really help. Asking for Flexeril instead. Reports that she hasn't been eating as much. Eating toast.  Not much of an appetite. Has lost weight. Reports that she is vomiting frequently. Says that some times she keeps down liquids but not  much solids. History of gastric by-pass and GERD. Has not been to GI in a while    Reports that she hasn't taken thyroid medication for two years. Says she had her thyroid removed several years ago. Doesn't remember when or where. Also asking for TB skin test for work.     Past Medical History  Past Medical History:   Diagnosis Date    Anxiety disorder     Arthritis     Cervical dystonia 9/5/2014    Cervical pain     Cervicogenic headache 9/5/2014    Chronic pain     knee    DDD (degenerative disc disease), cervical 9/5/2014    Essential hypertension     Fibrillation, atrial (HCC)     Fibromyalgia     GERD (gastroesophageal reflux disease)     \"malfunctioning esophagus\"    Headache(784.0)     Hepatic steatosis     Herniated disc, cervical     Hypercholesterolemia     Hypertension     Joint pain     JRA (juvenile rheumatoid arthritis) ([facility] [address]     Muscle pain     Musculoskeletal pain 9/5/2014    Non-intractable vomiting with nausea 6/4/2019    Numbness and tingling of left arm and leg     Status post cervical spinal fusion 9/5/2014    Thyroid disease     Vision decreased        Surgical History  Past Surgical History: Procedure Laterality Date    COLONOSCOPY N/A 4/18/2017    COLONOSCOPY, DIAGNOSTIC performed by Jayant Ng MD at [facility] [address]  5/21/13    C3-4 ACDF     HX COLONOSCOPY      HX GASTRIC BYPASS  02/03/10    HX LUMBAR LAMINECTOMY      HX ORTHOPAEDIC  2013    Spinal SurgeryC-2 AND C-3    HX OTHER SURGICAL      GIST tumor resected    HX OTHER SURGICAL      teeth extractions    HX PARTIAL HYSTERECTOMY  1988    AR EXCISION TUMOR SOFT TISS FACE/SCALP SUBQ < 2CM N/A 09/30/2016    Dr. Kavon Aragon        Medications  Current Outpatient Medications   Medication Sig Dispense Refill    ondansetron hcl (ZOFRAN) 8 mg tablet take 1 tablet by mouth every 12 hours if needed 30 Tab 0    hydroCHLOROthiazide (HYDRODIURIL) 25 mg tablet Take 1 Tab by mouth daily. Appointment required before further refills will be authorized. 30 Tab 5    oxyCODONE-acetaminophen (PERCOCET 10)  mg per tablet       dicyclomine (BENTYL) 10 mg capsule   0    omeprazole (PRILOSEC) 40 mg capsule Take 1 Cap by mouth two (2) times a day. 60 Cap 5    CYANOCOBALAMIN, VITAMIN B-12, (VITAMIN B-12 PO) Take 1 tablet by mouth as needed.  promethazine (PHENERGAN) 12.5 mg tablet Take 12.5 mg by mouth.  traZODone (DESYREL) 50 mg tablet Take 1 Tab by mouth nightly as needed for Sleep. 30 Tab 5    LORazepam (ATIVAN) 0.5 mg tablet take 1 tablet by mouth twice a day if needed 60 Tab 0    naloxone (NARCAN) 1 mg/mL injection 1 mL by IntraMUSCular route once as needed for up to 1 dose. 1 Syringe 0    aspirin delayed-release 81 mg tablet Take  by mouth daily.  levothyroxine (SYNTHROID) 75 mcg tablet Take 1 Tab by mouth Daily (before breakfast).  30 Tab 5       Allergies  Allergies   Allergen Reactions    Codeine Nausea and Vomiting    Pcn [Penicillins] Not Reported This Time and Hives    Tramadol Palpitations    Vicodin [Hydrocodone-Acetaminophen] Itching and Hives       Family History  Family History Problem Relation Age of Onset    Hypertension Mother     Heart Failure Mother     Hypertension Father     Arthritis-osteo Father        Social History  Social History     Socioeconomic History    Marital status:      Spouse name: Not on file    Number of children: Not on file    Years of education: Not on file    Highest education level: Not on file   Occupational History    Occupation: disabled     Employer: disability   Social Needs    Financial resource strain: Not on file    Food insecurity:     Worry: Not on file     Inability: Not on file    Transportation needs:     Medical: Not on file     Non-medical: Not on file   Tobacco Use    Smoking status: Former Smoker     Types: Cigarettes     Last attempt to quit: 1989     Years since quittin.5    Smokeless tobacco: Never Used   Substance and Sexual Activity    Alcohol use: No    Drug use: Yes     Types: Prescription, OTC    Sexual activity: Not on file   Lifestyle    Physical activity:     Days per week: Not on file     Minutes per session: Not on file    Stress: Not on file   Relationships    Social connections:     Talks on phone: Not on file     Gets together: Not on file     Attends Caodaism service: Not on file     Active member of club or organization: Not on file     Attends meetings of clubs or organizations: Not on file     Relationship status: Not on file    Intimate partner violence:     Fear of current or ex partner: Not on file     Emotionally abused: Not on file     Physically abused: Not on file     Forced sexual activity: Not on file   Other Topics Concern    Not on file   Social History Narrative    Not on file       Problem List  Patient Active Problem List   Diagnosis Code    S/P gastric bypass - date of surgery 02/10 Z98.84    Cervical disc disease with myelopathy M50.00    S/P cervical discectomy Z98.890    Status post cervical spinal arthrodesis Z98.1    Chronic pain syndrome G89.4    Postlaminectomy syndrome, cervical region M96.1    Myalgia and myositis, unspecified [id number]    Encounter for postoperative care Z48.89    Thyroid disease E07.9    Essential hypertension I10    Hypercholesterolemia E78.00    Musculoskeletal pain M79.18    DDD (degenerative disc disease), cervical M50.30    Status post cervical spinal fusion Z98.1    Cervicogenic headache R51    Cervical dystonia G24.3    Neck pain M54.2    Esophageal dysfunction K22.4    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 Eyes: With headaches- gets blurred vision   Respiratory:        Says that she gets a little short of breath if she walks too far. Cardiovascular: Negative. Gastrointestinal: Positive for abdominal pain, nausea and vomiting. Negative for diarrhea. Musculoskeletal: Positive for myalgias. Neurological: Positive for dizziness and headaches. Vital Signs  Vitals:    07/08/19 1326   BP: 128/79   Pulse: 83   Resp: 18   Temp: 98.2 °F (36.8 °C)   TempSrc: Oral   SpO2: 100%   Weight: 135 lb 6.4 oz (61.4 kg)   Height: 5' 5\" (1.651 m)   PainSc:   7   PainLoc: Generalized   LMP: 12/30/1988       Physical Exam  Physical Exam   Constitutional: She is oriented to person, place, and time. She appears well-developed and well-nourished. No distress.    HENT:   Nose: Nose normal.   Mouth/Throat: Oropharynx is clear and moist.   Eyes: Pupils are equal, round, and reactive to light. Neck: Normal range of motion. Neck supple. Cardiovascular: Normal rate and regular rhythm. Pulmonary/Chest: Effort normal and breath sounds normal.   Abdominal: Soft. Bowel sounds are normal. She exhibits no distension. Generalized tenderness upon palpation, mostly in epigastric region, no rebound tenderness or guarding   Musculoskeletal: Normal range of motion. Lymphadenopathy:     She has no cervical adenopathy. Neurological: She is alert and oriented to person, place, and time. No cranial nerve deficit. Coordination normal.   Skin: Skin is warm and dry. Psychiatric: She has a normal mood and affect. Her behavior is normal.   Nursing note and vitals reviewed. Diagnostics  Orders Placed This Encounter    promethazine (PHENERGAN) 12.5 mg tablet     Sig: Take 12.5 mg by mouth. Results  Results for orders placed or performed during the hospital encounter of 06/17/19   [facility] SPECIMEN COL   Result Value Ref Range    [facility]. Specimens collected/sent to [facility]. Please direct inquiries to ([phone]). Assessment and Plan  Diagnoses and all orders for this visit:    1. Frequent headaches  Neurology consult placed for further evaluation of headaches     2. Gastroesophageal reflux disease without esophagitis  GI consult placed    3. Generalized abdominal pain  GI consult placed. Discussed return/emergency precautions    4. Non-intractable vomiting with nausea, unspecified vomiting type  GI consult placed, Phenergan as directed. Discussed keeping hydrated discussed return/emergency precautions    5. Thyroid disease  Reviewed lab results with patient that show TSH is in normal limits despite patient not taking her medication for the past 2 years per her report.   Discussed with her that I will review her chart more thoroughly to determine best way to manage her thyroid issues at this time  Other orders  -     promethazine (PHENERGAN) 12.5 mg tablet; Take 1 Tab by mouth every eight (8) hours as needed for Nausea. -     cyclobenzaprine (FLEXERIL) 5 mg tablet; Take 1 Tab by mouth three (3) times daily as needed for Muscle Spasm(s). -     REFERRAL TO GASTROENTEROLOGY  -     REFERRAL TO NEUROLOGY        After care summary printed and reviewed with patient. Plan reviewed with patient. Questions answered. Patient verbalized understanding of plan and is in agreement with plan. Patient to follow up in three months or earlier if symptoms worsen. Encouraged the use of my chart.     Israel Puentes, [facility]

## 2019-07-10 ENCOUNTER — TELEPHONE (OUTPATIENT)
Dept: FAMILY MEDICINE CLINIC | Age: 64
End: 2019-07-10

## 2019-07-10 DIAGNOSIS — E07.9 THYROID DISEASE: ICD-10-CM

## 2019-07-10 DIAGNOSIS — E55.9 VITAMIN D DEFICIENCY: Primary | ICD-10-CM

## 2019-07-10 RX ORDER — ERGOCALCIFEROL 1.25 MG/1
50000 CAPSULE ORAL
Qty: 12 CAP | Refills: 1 | Status: SHIPPED | OUTPATIENT
Start: 2019-07-10 | End: 2019-07-22

## 2019-07-10 NOTE — TELEPHONE ENCOUNTER
Discussed with pt that I reviewed her chart and did not see any mention if she had a complete thyroidectomy. Most recent TSH within normal limits in spite of pt saying that she has not taken Synthroid for two years. Discussed with pt that we will re-check her thyroid labs before appt with Dr Manju Wilks at the end of the month. She was agreeable to this    Pt also asked if we were refilling Ativan for her. She was told that we could not do this, since she had not been taking it for so long and she verbalized, \"that's cool too\".   Verbalized understanding

## 2019-07-22 ENCOUNTER — OFFICE VISIT (OUTPATIENT)
Dept: FAMILY MEDICINE CLINIC | Age: 64
End: 2019-07-22

## 2019-07-22 VITALS
TEMPERATURE: 98.4 F | BODY MASS INDEX: 23.53 KG/M2 | WEIGHT: 141.2 LBS | DIASTOLIC BLOOD PRESSURE: 82 MMHG | RESPIRATION RATE: 20 BRPM | SYSTOLIC BLOOD PRESSURE: 127 MMHG | HEIGHT: 65 IN | HEART RATE: 81 BPM

## 2019-07-22 DIAGNOSIS — Z12.31 ENCOUNTER FOR SCREENING MAMMOGRAM FOR MALIGNANT NEOPLASM OF BREAST: ICD-10-CM

## 2019-07-22 DIAGNOSIS — Z86.39 H/O THYROID DISEASE: ICD-10-CM

## 2019-07-22 DIAGNOSIS — E55.9 VITAMIN D DEFICIENCY: ICD-10-CM

## 2019-07-22 DIAGNOSIS — Z00.00 MEDICARE ANNUAL WELLNESS VISIT, INITIAL: Primary | ICD-10-CM

## 2019-07-22 RX ORDER — ERGOCALCIFEROL 1.25 MG/1
50000 CAPSULE ORAL 2 TIMES WEEKLY
Qty: 24 CAP | Refills: 4 | Status: SHIPPED | OUTPATIENT
Start: 2019-07-22 | End: 2020-02-03 | Stop reason: ALTCHOICE

## 2019-07-22 NOTE — PATIENT INSTRUCTIONS
Medicare Wellness Visit, Female     The best way to live healthy is to have a lifestyle where you eat a well-balanced diet, exercise regularly, limit alcohol use, and quit all forms of tobacco/nicotine, if applicable. Regular preventive services are another way to keep healthy. Preventive services (vaccines, screening tests, monitoring & exams) can help personalize your care plan, which helps you manage your own care. Screening tests can find health problems at the earliest stages, when they are easiest to treat. [facility] follows the current, evidence-based guidelines published by the [facility] ([facility]) when recommending preventive services for our patients. Because we follow these guidelines, sometimes recommendations change over time as research supports it. (For example, mammograms used to be recommended annually. Even though Medicare will still pay for an annual mammogram, the newer guidelines recommend a mammogram every two years for women of average risk.)  Of course, you and your doctor may decide to screen more often for some diseases, based on your risk and your health status. Preventive services for you include:  - Medicare offers their members a free annual wellness visit, which is time for you and your primary care provider to discuss and plan for your preventive service needs. Take advantage of this benefit every year!  -All adults over the age of 72 should receive the recommended pneumonia vaccines. Current USPSTF guidelines recommend a series of two vaccines for the best pneumonia protection.   -All adults should have a flu vaccine yearly and a tetanus vaccine every 10 years. All adults age 61 and older should receive a shingles vaccine once in their lifetime.    -A bone mass density test is recommended when a woman turns 65 to screen for osteoporosis. This test is only recommended one time, as a screening.  Some providers will use this same test as a disease monitoring tool if you already have osteoporosis. -All adults age 38-68 who are overweight should have a diabetes screening test once every three years.   -Other screening tests and preventive services for persons with diabetes include: an eye exam to screen for diabetic retinopathy, a kidney function test, a foot exam, and stricter control over your cholesterol.   -Cardiovascular screening for adults with routine risk involves an electrocardiogram (ECG) at intervals determined by your doctor.   -Colorectal cancer screenings should be done for adults age 54-65 with no increased risk factors for colorectal cancer. There are a number of acceptable methods of screening for this type of cancer. Each test has its own benefits and drawbacks. Discuss with your doctor what is most appropriate for you during your annual wellness visit. The different tests include: colonoscopy (considered the best screening method), a fecal occult blood test, a fecal DNA test, and sigmoidoscopy. -Breast cancer screenings are recommended every other year for women of normal risk, age 54-69.  -Cervical cancer screenings for women over age 72 are only recommended with certain risk factors.   -All adults born between [facility] should be screened once for Hepatitis C.      Here is a list of your current Health Maintenance items (your personalized list of preventive services) with a due date:  Health Maintenance Due   Topic Date Due    DTaP/Tdap/Td  (1 - Tdap) 01/29/1976    Shingles Vaccine (1 of 2) 01/29/2005    Mammogram  11/02/2017    Annual Well Visit  03/14/2018

## 2019-07-22 NOTE — PROGRESS NOTES
Christophe Mckeon presents today for   Chief Complaint   [facility]   [facility]     Medicare       Christophe Mckeon preferred language for health care discussion is english/other. Is someone accompanying this pt? no    Is the patient using any DME equipment during [address]? no    Depression Screening:  3 most recent PHQ Screens 3/25/2019   Little interest or pleasure in doing things Not at all   Feeling down, depressed, irritable, or hopeless Not at all   Total Score PHQ 2 0       Learning Assessment:  Learning Assessment 6/4/2019   PRIMARY LEARNER Patient   HIGHEST LEVEL OF EDUCATION - PRIMARY LEARNER  SOME COLLEGE   BARRIERS PRIMARY LEARNER NONE   CO-LEARNER CAREGIVER No   PRIMARY LANGUAGE ENGLISH   LEARNER PREFERENCE PRIMARY LISTENING     -     -     -     -   ANSWERED BY Self   RELATIONSHIP SELF       Abuse Screening:  Abuse Screening Questionnaire 3/25/2019   Do you ever feel afraid of your partner? N   Are you in a relationship with someone who physically or mentally threatens you? N   Is it safe for you to go home? Y       Health Maintenance Due   Topic Date Due    DTaP/Tdap/Td series (1 - Tdap) 01/29/1976    Shingrix Vaccine Age 50> (1 of 2) 01/29/2005    BREAST CANCER SCRN MAMMOGRAM  11/02/2017    MEDICARE YEARLY EXAM  03/14/2018   . Health Maintenance reviewed and discussed and ordered per Provider. Coordination of Care:  1. Have you been to the ER, urgent care clinic since your last visit? Hospitalized since your last visit? no    2. Have you seen or consulted any other health care providers outside of the [address] [place] [address] since your last visit? Include any pap smears or colon screening.  no      Advance Directive discussed 7/8/19

## 2019-07-22 NOTE — PROGRESS NOTES
This is the Subsequent Medicare Annual Wellness Exam, performed 12 months or more after the Initial AWV or the last Subsequent AWV    I have reviewed the patient's medical history in detail and updated the computerized patient record. History     Past Medical History:   Diagnosis Date    Anxiety disorder     Arthritis     Cervical dystonia 9/5/2014    Cervical pain     Cervicogenic headache 9/5/2014    Chronic pain     knee    DDD (degenerative disc disease), cervical 9/5/2014    Essential hypertension     Fibrillation, atrial (HCC)     Fibromyalgia     GERD (gastroesophageal reflux disease)     \"malfunctioning esophagus\"    Headache(784.0)     Hepatic steatosis     Herniated disc, cervical     Hypercholesterolemia     Hypertension     Joint pain     JRA (juvenile rheumatoid arthritis) ([facility] [address]     Muscle pain     Musculoskeletal pain 9/5/2014    Non-intractable vomiting with nausea 6/4/2019    Numbness and tingling of left arm and leg     Status post cervical spinal fusion 9/5/2014    Thyroid disease     Vision decreased       Past Surgical History:   Procedure Laterality Date    COLONOSCOPY N/A 4/18/2017    COLONOSCOPY, DIAGNOSTIC performed by Ellie Montemayor MD at [address] [facility] [address]  5/21/13    C3-4 ACDF     HX COLONOSCOPY      HX GASTRIC BYPASS  02/03/10    HX LUMBAR LAMINECTOMY      HX ORTHOPAEDIC  2013    Spinal SurgeryC-2 AND C-3    HX OTHER SURGICAL      GIST tumor resected    HX OTHER SURGICAL      teeth extractions    HX PARTIAL HYSTERECTOMY  1988    AZ EXCISION TUMOR SOFT TISS FACE/SCALP SUBQ < 2CM N/A 09/30/2016    Dr. Anna Mckeon     Current Outpatient Medications   Medication Sig Dispense Refill    ergocalciferol (ERGOCALCIFEROL) 50,000 unit capsule Take 1 Cap by mouth two (2) times a week. 24 Cap 4    promethazine (PHENERGAN) 12.5 mg tablet Take 1 Tab by mouth every eight (8) hours as needed for Nausea.  30 Tab 0    cyclobenzaprine (FLEXERIL) 5 mg tablet Take 1 Tab by mouth three (3) times daily as needed for Muscle Spasm(s). 45 Tab 0    ondansetron hcl (ZOFRAN) 8 mg tablet take 1 tablet by mouth every 12 hours if needed 30 Tab 0    traZODone (DESYREL) 50 mg tablet Take 1 Tab by mouth nightly as needed for Sleep. 30 Tab 5    hydroCHLOROthiazide (HYDRODIURIL) 25 mg tablet Take 1 Tab by mouth daily. Appointment required before further refills will be authorized. 30 Tab 5    LORazepam (ATIVAN) 0.5 mg tablet take 1 tablet by mouth twice a day if needed 60 Tab 0    oxyCODONE-acetaminophen (PERCOCET 10)  mg per tablet       dicyclomine (BENTYL) 10 mg capsule   0    naloxone (NARCAN) 1 mg/mL injection 1 mL by IntraMUSCular route once as needed for up to 1 dose. 1 Syringe 0    omeprazole (PRILOSEC) 40 mg capsule Take 1 Cap by mouth two (2) times a day. 60 Cap 5    aspirin delayed-release 81 mg tablet Take  by mouth daily.  levothyroxine (SYNTHROID) 75 mcg tablet Take 1 Tab by mouth Daily (before breakfast). 30 Tab 5    CYANOCOBALAMIN, VITAMIN B-12, (VITAMIN B-12 PO) Take 1 tablet by mouth as needed.        Allergies   Allergen Reactions    Codeine Nausea and Vomiting    Pcn [Penicillins] Not Reported This Time and Hives    Tramadol Palpitations    Vicodin [Hydrocodone-Acetaminophen] Itching and Hives     Family History   Problem Relation Age of Onset    Hypertension Mother     Heart Failure Mother     Hypertension Father     Arthritis-osteo Father      Social History     Tobacco Use    Smoking status: Former Smoker     Types: Cigarettes     Last attempt to quit: 1989     Years since quittin.5    Smokeless tobacco: Never Used   Substance Use Topics    Alcohol use: No     Patient Active Problem List   Diagnosis Code    S/P gastric bypass - date of surgery 02/10 Z98.84    Cervical disc disease with myelopathy M50.00    S/P cervical discectomy Z98.890    Status post cervical spinal arthrodesis Z98.1    Chronic pain syndrome G89.4    Postlaminectomy syndrome, cervical region M96.1    Myalgia and myositis, unspecified [id number]    Encounter for postoperative care Z48.89    Thyroid disease E07.9    Essential hypertension I10    Hypercholesterolemia E78.00    Musculoskeletal pain M79.18    DDD (degenerative disc disease), cervical M50.30    Status post cervical spinal fusion Z98.1    Cervicogenic headache R51    Cervical dystonia G24.3    Neck pain M54.2    Esophageal dysfunction K22.4    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    Supraventricular tachycardia (HCC) I47.1    Gastroesophageal reflux disease with esophagitis K21.0    Anxiety F41.9    Weakness generalized R53.1    Other chest pain R07.89    Generalized abdominal pain R10.84    Other headache syndrome G44.89    Non-intractable vomiting with nausea R11.2    Hypertension, essential I10    Malaise R53.81    Atrial fibrillation with RVR (HCC) I48.91    Gastroesophageal reflux disease without esophagitis K21.9    Nausea R11.0    Frequent headaches R51       Depression Risk Factor Screening:     3 most recent PHQ Screens 3/25/2019   Little interest or pleasure in doing things Not at all   Feeling down, depressed, irritable, or hopeless Not at all   Total Score PHQ 2 0     Alcohol Risk Factor Screening: You do not drink alcohol or very rarely. Functional Ability and Level of Safety:   Hearing Loss  Hearing is good. Activities of Daily Living  The home contains: no safety equipment. Patient does total self care    Fall Risk  No flowsheet data found.     Abuse Screen  Patient is not abused    Cognitive Screening   Evaluation of Cognitive Function:  Has your family/caregiver stated any concerns about your memory: no  Normal    Patient Care Team   Patient Care Team:  Bridgette Reeder MD as PCP - General (Family Practice)  Valdo Duenas MD (General Surgery)  Julia He MD (Plastic Surgery)  Bernadine Jimenez MD as Physician (General Surgery)  Mateus Tidwell MD (Physical Medicine and Rehab)  Jose G Patton MD (Inactive) (Cardiology)    Assessment/Plan   Education and counseling provided:  Are appropriate based on today's review and evaluation  End-of-Life planning (with patient's consent)  Screening Mammography    Diagnoses and all orders for this visit:    1. Medicare annual wellness visit, initial    2. Encounter for screening mammogram for malignant neoplasm of breast  -     ROBERTA MAMMO BI SCREENING INCL CAD; Future    3. Vitamin D deficiency  -     ergocalciferol (ERGOCALCIFEROL) 50,000 unit capsule; Take 1 Cap by mouth two (2) times a week. 4. H/O thyroid disease  -     TSH 3RD GENERATION; Future  -     T4, FREE; Future  -     T3, FREE;  Future        Health Maintenance Due   Topic Date Due    DTaP/Tdap/Td series (1 - Tdap) 01/29/1976    Shingrix Vaccine Age 50> (1 of 2) 01/29/2005    BREAST CANCER SCRN MAMMOGRAM  11/02/2017

## 2019-07-22 NOTE — PROGRESS NOTES
Advance Care Planning (ACP) Provider Note - Comprehensive     Date of ACP Conversation: 07/22/19  Persons included in Conversation:  patient  Length of ACP Conversation in minutes:  16 minutes    Authorized Decision Maker (if patient is incapable of making informed decisions): This person is: Other Legally Authorized Decision Maker (e.g. Next of Kin)  Primary:  Dana Hooper)  Secondary: Delfin Cordova (dtr)          General ACP for ALL Patients with Decision Making Capacity:   Importance of advance care planning, including choosing a healthcare agent to communicate patient's healthcare decisions if patient lost the ability to make decisions, such as after a sudden illness or accident  Understanding of the healthcare agent role was assessed and information provided  Exploration of values, goals, and preferences if recovery is not expected, even with continued medical treatment in the event of: Imminent death  Severe, permanent brain injury  \"In these circumstances, what matters most to you? \"  Other: pt is undecided at this time.     Review of Existing Advance Directive:  pt does not yet have an AD    For Serious or Chronic Illness:  Understanding of medical condition      Interventions Provided:  Recommended completion of Advance Directive form after review of ACP materials and conversation with prospective healthcare agent

## 2019-08-16 DIAGNOSIS — R11.2 NON-INTRACTABLE VOMITING WITH NAUSEA, UNSPECIFIED VOMITING TYPE: ICD-10-CM

## 2019-08-16 RX ORDER — PROMETHAZINE HYDROCHLORIDE 12.5 MG/1
TABLET ORAL
Qty: 30 TAB | Refills: 0 | OUTPATIENT
Start: 2019-08-16

## 2019-08-19 RX ORDER — PROMETHAZINE HYDROCHLORIDE 12.5 MG/1
12.5 TABLET ORAL
Qty: 30 TAB | Refills: 0 | Status: SHIPPED | OUTPATIENT
Start: 2019-08-19 | End: 2021-11-22

## 2019-09-12 DIAGNOSIS — R11.0 NAUSEA: ICD-10-CM

## 2019-09-13 RX ORDER — ONDANSETRON HYDROCHLORIDE 8 MG/1
TABLET, FILM COATED ORAL
Qty: 30 TAB | Refills: 0 | Status: SHIPPED | OUTPATIENT
Start: 2019-09-13 | End: 2019-11-13 | Stop reason: SDUPTHER

## 2019-10-29 DIAGNOSIS — G47.00 INSOMNIA, UNSPECIFIED TYPE: ICD-10-CM

## 2019-10-31 RX ORDER — TRAZODONE HYDROCHLORIDE 50 MG/1
TABLET ORAL
Qty: 90 TAB | Refills: 1 | Status: SHIPPED | OUTPATIENT
Start: 2019-10-31 | End: 2020-02-03 | Stop reason: SDUPTHER

## 2019-12-20 ENCOUNTER — OFFICE VISIT (OUTPATIENT)
Dept: FAMILY MEDICINE CLINIC | Age: 64
End: 2019-12-20

## 2019-12-20 ENCOUNTER — HOSPITAL ENCOUNTER (OUTPATIENT)
Dept: LAB | Age: 64
Discharge: HOME | End: 2019-12-20
Payer: MEDICARE

## 2019-12-20 VITALS
RESPIRATION RATE: 18 BRPM | WEIGHT: 133.6 LBS | HEART RATE: 81 BPM | HEIGHT: 65 IN | DIASTOLIC BLOOD PRESSURE: 83 MMHG | TEMPERATURE: 97.6 F | BODY MASS INDEX: 22.26 KG/M2 | SYSTOLIC BLOOD PRESSURE: 160 MMHG

## 2019-12-20 DIAGNOSIS — E55.9 VITAMIN D DEFICIENCY: ICD-10-CM

## 2019-12-20 DIAGNOSIS — I10 HYPERTENSION, ESSENTIAL: ICD-10-CM

## 2019-12-20 DIAGNOSIS — K21.9 GASTROESOPHAGEAL REFLUX DISEASE WITHOUT ESOPHAGITIS: ICD-10-CM

## 2019-12-20 DIAGNOSIS — M27.40 CYST OF JAW: ICD-10-CM

## 2019-12-20 DIAGNOSIS — I10 HYPERTENSION, ESSENTIAL: Primary | ICD-10-CM

## 2019-12-20 DIAGNOSIS — Z86.39 H/O THYROID DISEASE: ICD-10-CM

## 2019-12-20 DIAGNOSIS — E07.9 THYROID DISEASE: ICD-10-CM

## 2019-12-20 DIAGNOSIS — R04.0 EPISTAXIS: ICD-10-CM

## 2019-12-20 DIAGNOSIS — R09.89 LABILE BLOOD PRESSURE: ICD-10-CM

## 2019-12-20 DIAGNOSIS — R15.1 FECAL SMEARING: ICD-10-CM

## 2019-12-20 DIAGNOSIS — R11.0 NAUSEA: ICD-10-CM

## 2019-12-20 LAB
25(OH)D3 SERPL-MCNC: 6.3 NG/ML (ref 30–100)
ALBUMIN SERPL-MCNC: 3.7 G/DL (ref 3.4–5)
ALBUMIN/GLOB SERPL: 1 {RATIO} (ref 0.8–1.7)
ALP SERPL-CCNC: 88 U/L (ref 45–117)
ALT SERPL-CCNC: 20 U/L (ref 13–56)
ANION GAP SERPL CALC-SCNC: 8 MMOL/L (ref 3–18)
AST SERPL-CCNC: 29 U/L (ref 10–38)
BILIRUB SERPL-MCNC: 0.5 MG/DL (ref 0.2–1)
BUN SERPL-MCNC: 8 MG/DL (ref 7–18)
BUN/CREAT SERPL: 9 (ref 12–20)
CALCIUM SERPL-MCNC: 8.6 MG/DL (ref 8.5–10.1)
CHLORIDE SERPL-SCNC: 109 MMOL/L (ref 100–111)
CO2 SERPL-SCNC: 26 MMOL/L (ref 21–32)
CREAT SERPL-MCNC: 0.91 MG/DL (ref 0.6–1.3)
GLOBULIN SER CALC-MCNC: 3.6 G/DL (ref 2–4)
GLUCOSE SERPL-MCNC: 71 MG/DL (ref 74–99)
POTASSIUM SERPL-SCNC: 3.7 MMOL/L (ref 3.5–5.5)
PROT SERPL-MCNC: 7.3 G/DL (ref 6.4–8.2)
SODIUM SERPL-SCNC: 143 MMOL/L (ref 136–145)
T3FREE SERPL-MCNC: 1.7 PG/ML (ref 2.18–3.98)
T4 FREE SERPL-MCNC: 0.7 NG/DL (ref 0.7–1.5)
TSH SERPL DL<=0.05 MIU/L-ACNC: 2.7 UIU/ML (ref 0.36–3.74)

## 2019-12-20 PROCEDURE — 82306 VITAMIN D 25 HYDROXY: CPT

## 2019-12-20 PROCEDURE — 84481 FREE ASSAY (FT-3): CPT

## 2019-12-20 PROCEDURE — 80053 COMPREHEN METABOLIC PANEL: CPT

## 2019-12-20 PROCEDURE — 84443 ASSAY THYROID STIM HORMONE: CPT

## 2019-12-20 PROCEDURE — 84439 ASSAY OF FREE THYROXINE: CPT

## 2019-12-20 PROCEDURE — 36415 COLL VENOUS BLD VENIPUNCTURE: CPT

## 2019-12-20 RX ORDER — LEVOTHYROXINE SODIUM 75 UG/1
75 TABLET ORAL
Qty: 30 TAB | Refills: 5 | Status: SHIPPED | OUTPATIENT
Start: 2019-12-20 | End: 2020-08-05 | Stop reason: SDUPTHER

## 2019-12-20 RX ORDER — HYDROCHLOROTHIAZIDE 25 MG/1
25 TABLET ORAL DAILY
Qty: 30 TAB | Refills: 5 | Status: SHIPPED | OUTPATIENT
Start: 2019-12-20 | End: 2020-08-05 | Stop reason: SDUPTHER

## 2019-12-20 RX ORDER — OMEPRAZOLE 40 MG/1
40 CAPSULE, DELAYED RELEASE ORAL 2 TIMES DAILY
Qty: 60 CAP | Refills: 5 | Status: SHIPPED | OUTPATIENT
Start: 2019-12-20

## 2019-12-20 NOTE — PATIENT INSTRUCTIONS
High Blood Pressure: Care Instructions  Overview    It's normal for blood pressure to go up and down throughout the day. But if it stays up, you have high blood pressure. Another name for high blood pressure is hypertension. Despite what a lot of people think, high blood pressure usually doesn't cause headaches or make you feel dizzy or lightheaded. It usually has no symptoms. But it does increase your risk of stroke, heart attack, and other problems. You and your doctor will talk about your risks of these problems based on your blood pressure. Your doctor will give you a goal for your blood pressure. Your goal will be based on your health and your age. Lifestyle changes, such as eating healthy and being active, are always important to help lower blood pressure. You might also take medicine to reach your blood pressure goal.  Follow-up care is a key part of your treatment and safety. Be sure to make and go to all appointments, and call your doctor if you are having problems. It's also a good idea to know your test results and keep a list of the medicines you take. How can you care for yourself at home? Medical treatment  · If you stop taking your medicine, your blood pressure will go back up. You may take one or more types of medicine to lower your blood pressure. Be safe with medicines. Take your medicine exactly as prescribed. Call your doctor if you think you are having a problem with your medicine. · Talk to your doctor before you start taking aspirin every day. Aspirin can help certain people lower their risk of a heart attack or stroke. But taking aspirin isn't right for everyone, because it can cause serious bleeding. · See your doctor regularly. You may need to see the doctor more often at first or until your blood pressure comes down. · If you are taking blood pressure medicine, talk to your doctor before you take decongestants or anti-inflammatory medicine, such as ibuprofen.  Some of these medicines can raise blood pressure. · Learn how to check your blood pressure at home. Lifestyle changes  · Stay at a healthy weight. This is especially important if you put on weight around the waist. Losing even 10 pounds can help you lower your blood pressure. · If your doctor recommends it, get more exercise. Walking is a good choice. Bit by bit, increase the amount you walk every day. Try for at least 30 minutes on most days of the week. You also may want to swim, bike, or do other activities. · Avoid or limit alcohol. Talk to your doctor about whether you can drink any alcohol. · Try to limit how much sodium you eat to less than 2,300 milligrams (mg) a day. Your doctor may ask you to try to eat less than 1,500 mg a day. · Eat plenty of fruits (such as bananas and oranges), vegetables, legumes, whole grains, and low-fat dairy products. · Lower the amount of saturated fat in your diet. Saturated fat is found in animal products such as milk, cheese, and meat. Limiting these foods may help you lose weight and also lower your risk for heart disease. · Do not smoke. Smoking increases your risk for heart attack and stroke. If you need help quitting, talk to your doctor about stop-smoking programs and medicines. These can increase your chances of quitting for good. When should you call for help? Call  911 anytime you think you may need emergency care. This may mean having symptoms that suggest that your blood pressure is causing a serious heart or blood vessel problem. Your blood pressure may be over 180/120.   For example, call  911 if:    · You have symptoms of a heart attack. These may include:  ? Chest pain or pressure, or a strange feeling in the chest.  ? Sweating. ? Shortness of breath. ? Nausea or vomiting. ? Pain, pressure, or a strange feeling in the back, neck, jaw, or upper belly or in one or both shoulders or arms. ? Lightheadedness or sudden weakness.   ? A fast or irregular heartbeat.     · You have symptoms of a stroke. These may include:  ? Sudden numbness, tingling, weakness, or loss of movement in your face, arm, or leg, especially on only one side of your body. ? Sudden vision changes. ? Sudden trouble speaking. ? Sudden confusion or trouble understanding simple statements. ? Sudden problems with walking or balance. ? A sudden, severe headache that is different from past headaches.     · You have severe back or belly pain.    Do not wait until your blood pressure comes down on its own. Get help right away.   Call your doctor now or seek immediate care if:    · Your blood pressure is much higher than normal (such as 180/120 or higher), but you don't have symptoms.     · You think high blood pressure is causing symptoms, such as:  ? Severe headache.  ? Blurry vision.    Watch closely for changes in your health, and be sure to contact your doctor if:    · Your blood pressure measures higher than your doctor recommends at least 2 times. That means the top number is higher or the bottom number is higher, or both.     · You think you may be having side effects from your blood pressure medicine. Where can you learn more? Go to http://andrei-juice.info/. Enter C599 in the search box to learn more about \"High Blood Pressure: Care Instructions. \"  Current as of: April 9, 2019  Content Version: 12.2  © 5334-0728 [employer]. Care instructions adapted under license by [employer] (which disclaims liability or warranty for this information). If you have questions about a medical condition or this instruction, always ask your healthcare professional. [address] any warranty or liability for your use of this information.

## 2019-12-20 NOTE — PROGRESS NOTES
Duc Duenas presents today for   Chief Complaint   Patient presents with    Thyroid Problem     follow up    Vitamin D Deficiency    Anemia    Hypertension    Epistaxis     Patient stated that she been having two or more nose bleed a day.  Medication Refill       Duc Duenas preferred language for health care discussion is english/other. Is someone accompanying this pt? no    Is the patient using any DME equipment during [address]? no    Depression Screening:  3 most recent PHQ Screens 3/25/2019   Little interest or pleasure in doing things Not at all   Feeling down, depressed, irritable, or hopeless Not at all   Total Score PHQ 2 0       Learning Assessment:  Learning Assessment 6/4/2019   PRIMARY LEARNER Patient   HIGHEST LEVEL OF EDUCATION - PRIMARY LEARNER  SOME COLLEGE   BARRIERS PRIMARY LEARNER NONE   CO-LEARNER CAREGIVER No   PRIMARY LANGUAGE ENGLISH   LEARNER PREFERENCE PRIMARY LISTENING     -     -     -     -   ANSWERED BY Self   RELATIONSHIP SELF       Abuse Screening:  Abuse Screening Questionnaire 3/25/2019   Do you ever feel afraid of your partner? N   Are you in a relationship with someone who physically or mentally threatens you? N   Is it safe for you to go home? Y       Generalized Anxiety  No flowsheet data found. Health Maintenance Due   Topic Date Due    DTaP/Tdap/Td series (1 - Tdap) 01/29/1966    BREAST CANCER SCRN MAMMOGRAM  11/02/2017    Bone Densitometry (Dexa) Screening  01/29/2020   . Health Maintenance reviewed and discussed and ordered per Provider. Duc Duenas is updated on all     Coordination of Care:  1. Have you been to the ER, urgent care clinic since your last visit? Hospitalized since your last visit? no    2. Have you seen or consulted any other health care providers outside of the [address] [place] [address] since your last visit? Include any pap smears or colon screening. no      Advance Directive:  1.  Do you have an advance directive in place? Patient Reply:no    2. If not, would you like material regarding how to put one in place?  Patient Reply: no

## 2019-12-20 NOTE — PROGRESS NOTES
Chief Complaint   Patient presents with    Thyroid Problem     follow up    Vitamin D Deficiency    Anemia    Hypertension    Epistaxis     Patient stated that she been having two or more nose bleed a day.  Medication Refill     HPI    Ashley Charles is a 59 y.o. female presenting today for 6 months  follow up of thyroid dz, anemia, htn, vit d def'cy. Pt c/o return of fecal smearing. She has seen for this in April by Dr Stephenie Nissen. She was to start a fiber supplement and imodium daily. She is doing this regularly. Pt c/o recent nosebleeds. It will bleed up to 30 min. Pt thinks her home bp readings are usually elevated at home. It is usually over 860 systolic but it will be as low as 90 systolic at times. That last happened    Pt still has intermittent nausea. She cont to take phenergan prn. She has not had an eval with gi to better understand the etiology. Pt has an area under her R jaw that is tender. She would like to have this checked. Patient does need medication refills today. Review of Systems   Constitutional: Negative. HENT: Positive for nosebleeds. Respiratory: Negative. Cardiovascular: Negative. Gastrointestinal: Positive for nausea. Stool leakage   All other systems reviewed and are negative. Physical Exam  Physical Exam   Nursing note and vitals reviewed. Constitutional: She is oriented to person, place, and time. She appears well-developed and well-nourished. HENT:   Head: Normocephalic and atraumatic. Right Ear: External ear normal.   Left Ear: External ear normal.   Nose: Nose normal.   Eyes: Conjunctivae and EOM are normal.   Neck: Normal range of motion. Neck supple. No JVD present. Carotid bruit is not present. No thyromegaly present. Cardiovascular: Normal rate, regular rhythm, normal heart sounds and intact distal pulses. Exam reveals no gallop and no friction rub. No murmur heard.   Pulmonary/Chest: Effort normal and breath sounds normal. She has no wheezes. She has no rhonchi. She has no rales. Abdominal: Soft. Bowel sounds are normal.   Musculoskeletal: Normal range of motion. Neurological: She is alert and oriented to person, place, and time. Coordination normal.   Skin: Skin is warm and dry. Psychiatric: She has a normal mood and affect. Her behavior is normal. Judgment and thought content normal.     Diagnoses and all orders for this visit:    1. Hypertension, essential  -     METABOLIC PANEL, COMPREHENSIVE; Future  -     hydroCHLOROthiazide (HYDRODIURIL) 25 mg tablet; Take 1 Tab by mouth daily. 2. Labile blood pressure  -     REFERRAL TO CARDIOLOGY    3. Thyroid disease  -     levothyroxine (SYNTHROID) 75 mcg tablet; Take 1 Tab by mouth Daily (before breakfast). 4. Gastroesophageal reflux disease without esophagitis  -     omeprazole (PRILOSEC) 40 mg capsule; Take 1 Cap by mouth two (2) times a day. 5. Nausea  -     REFERRAL TO GASTROENTEROLOGY    6. Fecal smearing  Pt to sched f/u with Dr Sendy Elder. 7. Vitamin D deficiency  -     VITAMIN D, 25 HYDROXY; Future    8. Epistaxis  9. Cyst of jaw  ent referral .  Pt given contact info to sched appt. Follow-up and Dispositions    · Return in about 6 weeks (around 1/31/2020). Over 40min spent with pt; greater than 50% of this time spent in counseling. Pt has multiple concerns. Concerns discussed in detail with pt.  tx options discussed in detail. Pt's questions answered; pt agrees with tx plan.

## 2020-02-03 ENCOUNTER — OFFICE VISIT (OUTPATIENT)
Dept: FAMILY MEDICINE CLINIC | Age: 65
End: 2020-02-03

## 2020-02-03 VITALS
HEIGHT: 65 IN | SYSTOLIC BLOOD PRESSURE: 146 MMHG | BODY MASS INDEX: 23.76 KG/M2 | DIASTOLIC BLOOD PRESSURE: 82 MMHG | HEART RATE: 78 BPM | WEIGHT: 142.6 LBS | TEMPERATURE: 97.8 F | RESPIRATION RATE: 18 BRPM

## 2020-02-03 DIAGNOSIS — K21.9 GASTROESOPHAGEAL REFLUX DISEASE WITHOUT ESOPHAGITIS: ICD-10-CM

## 2020-02-03 DIAGNOSIS — G47.00 INSOMNIA, UNSPECIFIED TYPE: ICD-10-CM

## 2020-02-03 DIAGNOSIS — Z12.31 ENCOUNTER FOR SCREENING MAMMOGRAM FOR MALIGNANT NEOPLASM OF BREAST: ICD-10-CM

## 2020-02-03 DIAGNOSIS — R11.0 NAUSEA: ICD-10-CM

## 2020-02-03 DIAGNOSIS — E07.9 THYROID DISEASE: ICD-10-CM

## 2020-02-03 DIAGNOSIS — I10 HYPERTENSION, ESSENTIAL: Primary | ICD-10-CM

## 2020-02-03 DIAGNOSIS — E55.9 VITAMIN D DEFICIENCY: ICD-10-CM

## 2020-02-03 DIAGNOSIS — Z78.0 POSTMENOPAUSAL STATE: ICD-10-CM

## 2020-02-03 RX ORDER — DICYCLOMINE HYDROCHLORIDE 10 MG/1
10 CAPSULE ORAL
Qty: 30 CAP | Refills: 0 | Status: SHIPPED | OUTPATIENT
Start: 2020-02-03 | End: 2020-04-06

## 2020-02-03 RX ORDER — TRAZODONE HYDROCHLORIDE 50 MG/1
TABLET ORAL
Qty: 90 TAB | Refills: 1 | Status: SHIPPED | OUTPATIENT
Start: 2020-02-03 | End: 2020-10-05 | Stop reason: SDUPTHER

## 2020-02-03 RX ORDER — TIZANIDINE 4 MG/1
TABLET ORAL AS NEEDED
COMMUNITY
Start: 2020-01-30

## 2020-02-03 RX ORDER — OXYCODONE AND ACETAMINOPHEN 10; 325 MG/1; MG/1
TABLET ORAL AS NEEDED
COMMUNITY
Start: 2020-01-23 | End: 2021-11-23

## 2020-02-03 NOTE — PROGRESS NOTES
Jossy Brewer presents today for   Chief Complaint   Patient presents with    Hypertension     follow up    Thyroid Problem    Vitamin D Deficiency    GERD    Labs     completed 12/20/19    Urinary Burning     Patient stated that she been having burning on uriation x 1 week.  Medication Refill       Is someone accompanying this pt? no    Is the patient using any DME equipment during OV? no    Depression Screening:  3 most recent PHQ Screens 2/3/2020   Little interest or pleasure in doing things Not at all   Feeling down, depressed, irritable, or hopeless Not at all   Total Score PHQ 2 0       Learning Assessment:  Learning Assessment 2/3/2020   PRIMARY LEARNER Patient   HIGHEST LEVEL OF EDUCATION - PRIMARY LEARNER  [address] [facility] PRIMARY LEARNER NONE   CO-LEARNER CAREGIVER No   PRIMARY LANGUAGE ENGLISH   LEARNER PREFERENCE PRIMARY LISTENING     -     -     -     -   ANSWERED BY self   RELATIONSHIP SELF       Abuse Screening:  Abuse Screening Questionnaire 2/3/2020   Do you ever feel afraid of your partner? N   Are you in a relationship with someone who physically or mentally threatens you? N   Is it safe for you to go home? Y       Fall Screening  Fall Risk Assessment, last 12 mths 2/3/2020   Able to walk? Yes   Fall in past 12 months? No       Generalized Anxiety  No flowsheet data found. Health Maintenance Due   Topic Date Due    DTaP/Tdap/Td series (1 - Tdap) 01/29/1966    BREAST CANCER SCRN MAMMOGRAM  11/02/2017    Shingrix Vaccine Age 50> (2 of 2) 01/10/2020    GLAUCOMA SCREENING Q2Y  01/29/2020    Bone Densitometry (Dexa) Screening  01/29/2020    Pneumococcal 65+ years (2 of 2 - PPSV23) 01/29/2020   . Health Maintenance reviewed and discussed and ordered per Provider. Jossy Brewer is updated on all     Coordination of Care  1. Have you been to the ER, urgent care clinic since your last visit? Hospitalized since your last visit? no    2.  Have you seen or consulted any other health care providers outside of the [address] [place] [address] since your last visit? Include any pap smears or colon screening. no      Advance Directive:  1. Do you have an advance directive in place? Patient Reply:no    2. If not, would you like material regarding how to put one in place?  Patient Reply: no

## 2020-02-03 NOTE — PROGRESS NOTES
Chief Complaint   Patient presents with    Hypertension     follow up    Thyroid Problem    Vitamin D Deficiency    GERD    Labs     completed 12/20/19    Urinary Burning     Patient stated that she been having burning on uriation x 1 week.  Medication Refill         HPI    Edil Euceda is a 72 y.o. female presenting today for follow up of htn, thyroid dz, vit d def'cy, gerd. Pt cont to have fecal smearing. She has seen gi.  egd and colo are planned for 2/12/2020. She does take fiber and imodium but does not tolerate them to take them every day. These meds do help but only if she does not vomit. Pt feels that she needs her antiemetic med back. She did not think to ask gi for the med. Pt cont to have nose bleeds but they are less frequent. She has received the letter from [facility]. she will call to sched an appt. She is not certain why she has not been able to get through to them. Patient had labs on 12/20/19. Labs reviewed in detail with patient       Review of Systems   Constitutional: Negative. HENT: Positive for nosebleeds. Respiratory: Negative. Cardiovascular: Negative. Gastrointestinal: Positive for nausea. Fecal smearing   All other systems reviewed and are negative. Physical Exam  Physical Exam   Nursing note and vitals reviewed. Constitutional: She is oriented to person, place, and time. She appears well-developed and well-nourished. HENT:   Head: Normocephalic and atraumatic. Right Ear: External ear normal.   Left Ear: External ear normal.   Nose: Nose normal.   Eyes: Conjunctivae and EOM are normal.   Neck: Normal range of motion. Neck supple. No JVD present. Carotid bruit is not present. No thyromegaly present. Cardiovascular: Normal rate, regular rhythm, normal heart sounds and intact distal pulses. Exam reveals no gallop and no friction rub. No murmur heard. Pulmonary/Chest: Effort normal and breath sounds normal. She has no wheezes. She has no rhonchi. She has no rales. Abdominal: Soft. Bowel sounds are normal.   Musculoskeletal: Normal range of motion. Neurological: She is alert and oriented to person, place, and time. Coordination normal.   Skin: Skin is warm and dry. Psychiatric: She has a normal mood and affect. Her behavior is normal. Judgment and thought content normal.     Diagnoses and all orders for this visit:    1. Hypertension, essential  -     METABOLIC PANEL, COMPREHENSIVE; Future  -     LIPID PANEL; Future  -     TSH 3RD GENERATION; Future  Pt reminded to contact cards for eval of labile bp    2. Thyroid disease  -     TSH 3RD GENERATION; Future    3. Vitamin D deficiency  -     VITAMIN D, 25 HYDROXY; Future    4. Gastroesophageal reflux disease without esophagitis  egd planned by gi    5. Insomnia, unspecified type  -     traZODone (DESYREL) 50 mg tablet; take 1 tablet by mouth at bedtime if needed    6. Encounter for screening mammogram for malignant neoplasm of breast  -     ROBERTA MAMMO BI SCREENING INCL CAD; Future    7. Postmenopausal state  -     DEXA BONE DENSITY STUDY AXIAL; Future    8. Nausea:  Recommend trial of emetrol; otherwise, antiemetics as per gi      Other orders  -     dicyclomine (BENTYL) 10 mg capsule; Take 1 Cap by mouth daily as needed for Abdominal Cramps. Follow-up and Dispositions    · Return in about 3 months (around 5/3/2020) for high blood pressure, insomnia, gerd, thyroid disease, vit D deficiency, lab review.

## 2020-02-26 RX ORDER — DICYCLOMINE HYDROCHLORIDE 10 MG/1
CAPSULE ORAL
Qty: 30 CAP | Refills: 0 | OUTPATIENT
Start: 2020-02-26

## 2020-04-06 RX ORDER — DICYCLOMINE HYDROCHLORIDE 10 MG/1
CAPSULE ORAL
Qty: 30 CAP | Refills: 0 | Status: SHIPPED | OUTPATIENT
Start: 2020-04-06 | End: 2021-11-22

## 2020-04-30 RX ORDER — DICYCLOMINE HYDROCHLORIDE 10 MG/1
CAPSULE ORAL
Qty: 30 CAP | Refills: 0 | OUTPATIENT
Start: 2020-04-30

## 2020-05-04 ENCOUNTER — VIRTUAL VISIT (OUTPATIENT)
Dept: FAMILY MEDICINE CLINIC | Age: 65
End: 2020-05-04

## 2020-05-04 DIAGNOSIS — E07.9 THYROID DISEASE: ICD-10-CM

## 2020-05-04 DIAGNOSIS — R11.0 NAUSEA: ICD-10-CM

## 2020-05-04 DIAGNOSIS — K21.9 GASTROESOPHAGEAL REFLUX DISEASE WITHOUT ESOPHAGITIS: ICD-10-CM

## 2020-05-04 DIAGNOSIS — I10 HYPERTENSION, ESSENTIAL: Primary | ICD-10-CM

## 2020-05-04 DIAGNOSIS — E55.9 VITAMIN D DEFICIENCY: ICD-10-CM

## 2020-05-04 DIAGNOSIS — R15.1 FECAL SMEARING: ICD-10-CM

## 2020-05-04 RX ORDER — PROMETHAZINE HYDROCHLORIDE 12.5 MG/1
12.5 TABLET ORAL
Qty: 30 TAB | Refills: 0 | Status: CANCELLED | OUTPATIENT
Start: 2020-05-04

## 2020-05-04 NOTE — PROGRESS NOTES
Omar Pederson presents today for   Chief Complaint   Patient presents with    Hypertension    Insomnia    GERD    Hypothyroidism    Vitamin D Deficiency       Is someone accompanying this pt? no    Is the patient using any DME equipment during OV? no    Depression Screening:  3 most recent PHQ Screens 2/3/2020   Little interest or pleasure in doing things Not at all   Feeling down, depressed, irritable, or hopeless Not at all   Total Score PHQ 2 0       Learning Assessment:  Learning Assessment 2/3/2020   PRIMARY LEARNER Patient   HIGHEST LEVEL OF EDUCATION - PRIMARY LEARNER  [address] [place] [address] PRIMARY LEARNER NONE   CO-LEARNER CAREGIVER No   PRIMARY LANGUAGE ENGLISH   LEARNER PREFERENCE PRIMARY LISTENING     -     -     -     -   ANSWERED BY self   RELATIONSHIP SELF       Abuse Screening:  Abuse Screening Questionnaire 2/3/2020   Do you ever feel afraid of your partner? N   Are you in a relationship with someone who physically or mentally threatens you? N   Is it safe for you to go home? Y       Fall Screening  Fall Risk Assessment, last 12 mths 2/3/2020   Able to walk? Yes   Fall in past 12 months? No       Generalized Anxiety  No flowsheet data found. Health Maintenance Due   Topic Date Due    DTaP/Tdap/Td series (1 - Tdap) 01/29/1976    Breast Cancer Screen Mammogram  11/02/2018    Shingrix Vaccine Age 50> (2 of 2) 01/10/2020    GLAUCOMA SCREENING Q2Y  01/29/2020    Bone Densitometry (Dexa) Screening  01/29/2020    Pneumococcal 65+ years (2 of 2 - PPSV23) 01/29/2020   . Health Maintenance reviewed and discussed and ordered per Provider. Coordination of Care  1. Have you been to the ER, urgent care clinic since your last visit? Hospitalized since your last visit? no    2. Have you seen or consulted any other health care providers outside of the [address] [place] [address] since your last visit? Include any pap smears or colon screening.  no      Advance Directive:  Discussed 2/3/20

## 2020-05-04 NOTE — PROGRESS NOTES
Barbara Maher is a 72 y.o. female who was seen by synchronous (real-time) audio-video technology on 5/4/2020. Consent: Barbara Maher, who was seen by synchronous (real-time) audio-video technology, and/or her healthcare decision maker, is aware that this patient-initiated, Telehealth encounter on 5/4/2020 is a billable service, with coverage as determined by her insurance carrier. She is aware that she may receive a bill and has provided verbal consent to proceed: Yes. Assessment & Plan:   Diagnoses and all orders for this visit:    1. Hypertension, essential  Recommend routine home blood pressure monitoring. Pt advised to call cards for an appt as they were not able to reach her to sched an appt. 2. Thyroid disease  No recent labs. Cont to monitor. 3. Vitamin D deficiency  No recent labs. Cont to monitor. 4. Gastroesophageal reflux disease without esophagitis  5. Nausea  6. Fecal smearing  Pt reminded to contact gi for f/u appt. The complexity of medical decision making for this visit is moderate   Follow-up and Dispositions    · Return in about 3 months (around 8/4/2020) for high blood pressure, thyroid disease, vit D deficiency, gerd. 712  Subjective:   Barbara Maher is a 72 y.o. female who was seen for Hypertension; Insomnia; GERD; Hypothyroidism; and Vitamin D Deficiency    Pt reports that she cont to have nausea. She is aware that her gi doctor should be prescribing her nausea meds. She will call for a refill of her phenergan. She did have her egd performed prior to the coronavirus pandemic. She also had her colonoscopy. She had polyps, diverticulosis, and hemorrhoids. Pt has not checked home bp lately. She feels it has been 587C sys, 79L diastolic over the last few months. She is trying to be careful with her diet and is trying to exercise in her yard. Pt has not done her labs.       Prior to Admission medications    Medication Sig Start Date End Date Taking? Authorizing Provider   dicyclomine (BENTYL) 10 mg capsule take 1 capsule by mouth daily if needed for ABDOMINAL CRAMPS 4/6/20  Yes Vu Sesay MD   oxyCODONE-acetaminophen (PERCOCET 10)  mg per tablet TK 1 T PO  TID PRN 1/23/20  Yes Provider, Historical   tiZANidine (ZANAFLEX) 4 mg tablet  1/30/20  Yes Provider, Historical   traZODone (DESYREL) 50 mg tablet take 1 tablet by mouth at bedtime if needed 2/3/20  Yes Vu Sesay MD   levothyroxine (SYNTHROID) 75 mcg tablet Take 1 Tab by mouth Daily (before breakfast). 12/20/19  Yes Vu Sesay MD   hydroCHLOROthiazide (HYDRODIURIL) 25 mg tablet Take 1 Tab by mouth daily. 12/20/19  Yes Vu Sesay MD   ondansetron hcl (ZOFRAN) 8 mg tablet take 1 tablet by mouth every 12 hours if needed 11/15/19  Yes Finesse Mendosa MD   naloxone (NARCAN) 1 mg/mL injection 1 mL by IntraMUSCular route once as needed for up to 1 dose. 11/8/17  Yes Finesse Mendosa MD   CYANOCOBALAMIN, VITAMIN B-12, (VITAMIN B-12 PO) Take 1 tablet by mouth as needed. Yes Provider, Historical   omeprazole (PRILOSEC) 40 mg capsule Take 1 Cap by mouth two (2) times a day. 12/20/19   Vu Sesay MD   promethazine (PHENERGAN) 12.5 mg tablet Take 1 Tab by mouth every eight (8) hours as needed for Nausea.  8/19/19   Vu Sesay MD     Allergies   Allergen Reactions    Codeine Nausea and Vomiting    Pcn [Penicillins] Not Reported This Time and Hives    Tramadol Palpitations    Vicodin [Hydrocodone-Acetaminophen] Itching and Hives       Patient Active Problem List   Diagnosis Code    S/P gastric bypass - date of surgery 02/10 Z98.84    Cervical disc disease with myelopathy M50.00    S/P cervical discectomy Z98.890    Status post cervical spinal arthrodesis Z98.1    Chronic pain syndrome G89.4    Postlaminectomy syndrome, cervical region M96.1    Myalgia and myositis, unspecified [id number]    Encounter for postoperative care Z48.89    Thyroid disease E07.9    Essential hypertension I10    Hypercholesterolemia E78.00    Musculoskeletal pain M79.18    DDD (degenerative disc disease), cervical M50.30    Status post cervical spinal fusion Z98.1    Cervicogenic headache R51    Cervical dystonia G24.3    Neck pain M54.2    Esophageal dysfunction K22.4    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    Supraventricular tachycardia (HCC) I47.1    Gastroesophageal reflux disease with esophagitis K21.0    Anxiety F41.9    Weakness generalized R53.1    Other chest pain R07.89    Generalized abdominal pain R10.84    Other headache syndrome G44.89    Non-intractable vomiting with nausea R11.2    Hypertension, essential I10    Malaise R53.81    Atrial fibrillation with RVR (HCC) I48.91    Gastroesophageal reflux disease without esophagitis K21.9    Nausea R11.0    Frequent headaches R51     Current Outpatient Medications   Medication Sig Dispense Refill    dicyclomine (BENTYL) 10 mg capsule take 1 capsule by mouth daily if needed for ABDOMINAL CRAMPS 30 Cap 0    oxyCODONE-acetaminophen (PERCOCET 10)  mg per tablet TK 1 T PO  TID PRN      tiZANidine (ZANAFLEX) 4 mg tablet       traZODone (DESYREL) 50 mg tablet take 1 tablet by mouth at bedtime if needed 90 Tab 1    levothyroxine (SYNTHROID) 75 mcg tablet Take 1 Tab by mouth Daily (before breakfast). 30 Tab 5    hydroCHLOROthiazide (HYDRODIURIL) 25 mg tablet Take 1 Tab by mouth daily. 30 Tab 5    ondansetron hcl (ZOFRAN) 8 mg tablet take 1 tablet by mouth every 12 hours if needed 30 Tab 0    naloxone (NARCAN) 1 mg/mL injection 1 mL by IntraMUSCular route once as needed for up to 1 dose. 1 Syringe 0    CYANOCOBALAMIN, VITAMIN B-12, (VITAMIN B-12 PO) Take 1 tablet by mouth as needed.  omeprazole (PRILOSEC) 40 mg capsule Take 1 Cap by mouth two (2) times a day. 60 Cap 5    promethazine (PHENERGAN) 12.5 mg tablet Take 1 Tab by mouth every eight (8) hours as needed for Nausea.  30 Tab 0 Allergies   Allergen Reactions    Codeine Nausea and Vomiting    Pcn [Penicillins] Not Reported This Time and Hives    Tramadol Palpitations    Vicodin [Hydrocodone-Acetaminophen] Itching and Hives     Past Medical History:   Diagnosis Date    Anxiety disorder     Arthritis     Cervical dystonia 9/5/2014    Cervical pain     Cervicogenic headache 9/5/2014    Chronic pain     knee    DDD (degenerative disc disease), cervical 9/5/2014    Essential hypertension     Fibrillation, atrial (HCC)     Fibromyalgia     GERD (gastroesophageal reflux disease)     \"malfunctioning esophagus\"    Headache(784.0)     Hepatic steatosis     Herniated disc, cervical     Hypercholesterolemia     Hypertension     Joint pain     JRA (juvenile rheumatoid arthritis) ([facility] [address]     Muscle pain     Musculoskeletal pain 9/5/2014    Non-intractable vomiting with nausea 6/4/2019    Numbness and tingling of left arm and leg     Status post cervical spinal fusion 9/5/2014    Thyroid disease     Vision decreased      Past Surgical History:   Procedure Laterality Date    COLONOSCOPY N/A 4/18/2017    COLONOSCOPY, DIAGNOSTIC performed [facility] ENDOSCOPY    HX CERVICAL LAMINECTOMY  5/21/13    C3-4 ACDF     HX COLONOSCOPY      HX GASTRIC BYPASS  02/03/10    HX LUMBAR LAMINECTOMY      HX ORTHOPAEDIC  2013    Spinal SurgeryC-2 AND C-3    HX OTHER SURGICAL      GIST tumor resected    HX OTHER SURGICAL      teeth extractions    HX PARTIAL HYSTERECTOMY  1988    KS EXCISION TUMOR SOFT TISS FACE/SCALP SUBQ < 2CM N/A 09/30/2016    Dr. Aakash Gilbert     Family History   Problem Relation Age of Onset    Hypertension Mother     Heart Failure Mother     Hypertension Father     Arthritis-osteo Father      Social History     Tobacco Use    Smoking status: Former Smoker     Packs/day: 1.00     Years: 5.00     Pack years: 5.00     Types: Cigarettes     Last attempt to quit: 12/31/1989     Years since quittin.3    Smokeless tobacco: Never Used   Substance Use Topics    Alcohol use: No       Review of Systems   Constitutional: Negative. HENT: Negative. Respiratory: Negative. Cardiovascular: Negative. Gastrointestinal: Positive for blood in stool, diarrhea, nausea and vomiting. All other systems reviewed and are negative. Objective:     Visit Vitals  LMP 1988      General: alert, cooperative, no distress   Mental  status: normal mood, behavior, speech, dress, motor activity, and thought processes, able to follow commands   HENT: NCAT   Neck: no visualized mass   Resp: no respiratory distress   Neuro: no gross deficits   Skin: no discoloration or lesions of concern on visible areas   Psychiatric: normal affect, consistent with stated mood, no evidence of hallucinations     Additional exam findings:  none      We discussed the expected course, resolution and complications of the diagnosis(es) in detail. Medication risks, benefits, costs, interactions, and alternatives were discussed as indicated. I advised her to contact the office if her condition worsens, changes or fails to improve as anticipated. She expressed understanding with the diagnosis(es) and plan. Amanuel Shah is a 72 y.o. female who was evaluated by a video visit encounter for concerns as above. Patient identification was verified prior to start of the visit. A caregiver was present when appropriate. Due to this being a TeleHealth encounter (During [facility]- public health emergency), evaluation of the following organ systems was limited: Vitals/Constitutional/EENT/Resp/CV/GI//MS/Neuro/Skin/Heme-Lymph-Imm.   Pursuant to the emergency declaration under the [facility] waiver authority and the [employer] Act, this Virtual  Visit was conducted, with patient's (and/or legal guardian's) consent, to reduce the patient's risk of exposure to COVID-19 and provide necessary medical care. Services were provided through a video synchronous discussion virtually to substitute for in-person clinic visit. Patient and provider were located at their individual homes.       Mona Murillo MD

## 2020-06-02 ENCOUNTER — HOSPITAL ENCOUNTER (OUTPATIENT)
Dept: MAMMOGRAPHY | Age: 65
Discharge: HOME | End: 2020-06-02
Attending: FAMILY MEDICINE
Payer: MEDICARE

## 2020-06-02 ENCOUNTER — HOSPITAL ENCOUNTER (OUTPATIENT)
Dept: BONE DENSITY | Age: 65
Discharge: HOME | End: 2020-06-02
Attending: FAMILY MEDICINE
Payer: MEDICARE

## 2020-06-02 DIAGNOSIS — Z12.31 ENCOUNTER FOR SCREENING MAMMOGRAM FOR MALIGNANT NEOPLASM OF BREAST: ICD-10-CM

## 2020-06-02 DIAGNOSIS — Z78.0 POSTMENOPAUSAL STATE: ICD-10-CM

## 2020-06-02 PROCEDURE — 77080 DXA BONE DENSITY AXIAL: CPT

## 2020-06-02 PROCEDURE — 77063 BREAST TOMOSYNTHESIS BI: CPT

## 2020-06-11 ENCOUNTER — TELEPHONE (OUTPATIENT)
Dept: FAMILY MEDICINE CLINIC | Age: 65
End: 2020-06-11

## 2020-06-12 NOTE — TELEPHONE ENCOUNTER
This doctor is [facility] he is in the [facility].  She does not need a insurance referral.

## 2020-08-05 ENCOUNTER — OFFICE VISIT (OUTPATIENT)
Dept: FAMILY MEDICINE CLINIC | Age: 65
End: 2020-08-05

## 2020-08-05 ENCOUNTER — VIRTUAL VISIT (OUTPATIENT)
Dept: FAMILY MEDICINE CLINIC | Age: 65
End: 2020-08-05

## 2020-08-05 DIAGNOSIS — E07.9 THYROID DISEASE: ICD-10-CM

## 2020-08-05 DIAGNOSIS — E55.9 VITAMIN D DEFICIENCY: ICD-10-CM

## 2020-08-05 DIAGNOSIS — I48.91 ATRIAL FIBRILLATION WITH RVR (HCC): ICD-10-CM

## 2020-08-05 DIAGNOSIS — I10 HYPERTENSION, ESSENTIAL: Primary | ICD-10-CM

## 2020-08-05 RX ORDER — ONDANSETRON 4 MG/1
TABLET, FILM COATED ORAL
COMMUNITY
Start: 2020-07-15 | End: 2021-11-22

## 2020-08-05 RX ORDER — DICYCLOMINE HYDROCHLORIDE 20 MG/1
TABLET ORAL
COMMUNITY
Start: 2020-07-09 | End: 2021-04-27 | Stop reason: SDUPTHER

## 2020-08-05 RX ORDER — LEVOTHYROXINE SODIUM 75 UG/1
75 TABLET ORAL
Qty: 30 TAB | Refills: 5 | Status: SHIPPED | OUTPATIENT
Start: 2020-08-05 | End: 2020-11-04

## 2020-08-05 RX ORDER — HYDROCHLOROTHIAZIDE 25 MG/1
25 TABLET ORAL DAILY
Qty: 30 TAB | Refills: 5 | Status: SHIPPED | OUTPATIENT
Start: 2020-08-05 | End: 2021-04-08

## 2020-08-05 NOTE — PROGRESS NOTES
Bre Spence presents today for   Chief Complaint   Patient presents with    Vitamin D Deficiency     Follow up    GERD     Follow up    Thyroid Problem     Follow up    Hypertension     Follow up       Bre Spence preferred language for health care discussion is english/other. Is someone accompanying this pt? No     Is the patient using any DME equipment during OV? No.    Depression Screening:  3 most recent PHQ Screens 2/3/2020   Little interest or pleasure in doing things Not at all   Feeling down, depressed, irritable, or hopeless Not at all   Total Score PHQ 2 0       Learning Assessment:  Learning Assessment 2/3/2020   PRIMARY LEARNER Patient   HIGHEST LEVEL OF EDUCATION - PRIMARY LEARNER  [address] PRIMARY LEARNER NONE   CO-LEARNER CAREGIVER No   PRIMARY LANGUAGE ENGLISH   LEARNER PREFERENCE PRIMARY LISTENING     -     -     -     -   ANSWERED BY self   RELATIONSHIP SELF       Abuse Screening:  Abuse Screening Questionnaire 8/5/2020   Do you ever feel afraid of your partner? N   Are you in a relationship with someone who physically or mentally threatens you? N   Is it safe for you to go home? Y       Fall Risk  Fall Risk Assessment, last 12 mths 2/3/2020   Able to walk? Yes   Fall in past 12 months? No         Coordination of Care:  1. Have you been to the ER, urgent care clinic since your last visit? Hospitalized since your last visit? No    2. Have you seen or consulted any other health care providers outside of the [address] [place] [address] since your last visit? Include any pap smears or colon screening. Pt states that she has seen Ortho 7/2020 related to knee and hip pain. Advance Directive:  1. Do you have an advance directive in place? Patient Reply:No    2. If not, would you like material regarding how to put one in place?  Patient Reply: No

## 2020-08-05 NOTE — PROGRESS NOTES
Danelle Ang is a 72 y.o. female, evaluated via audio-only technology on 8/5/2020 for Vitamin D Deficiency (Follow up); GERD (Follow up); Thyroid Problem (Follow up); and Hypertension (Follow up)  . Assessment & Plan:   Diagnoses and all orders for this visit:    1. Hypertension, essential  -     hydroCHLOROthiazide (HYDRODIURIL) 25 mg tablet; Take 1 Tab by mouth daily.  -     METABOLIC PANEL, COMPREHENSIVE; Future  -     LIPID PANEL; Future    2. Thyroid disease  -     levothyroxine (Synthroid) 75 mcg tablet; Take 1 Tab by mouth Daily (before breakfast). -     TSH 3RD GENERATION; Future    3. Vitamin D deficiency  -     VITAMIN D, 25 HYDROXY; Future    4. Atrial fibrillation with RVR (Kayleen Espinoza)  Pt given contact info to set up an appt with cards. Follow-up and Dispositions    · Return in about 3 months (around 11/5/2020) for high blood pressure, thyroid disease, vit D deficiency, lab review. 12  Subjective:   Pt has been doing well overall. No recent labs. Pt did see gi in May. She cont to have some rectal issues. Pt did not see cardiology. She forgot to call to set up an appt. Contact info given. Pt has been seeing ortho for L hip pain s/p fall. She has had injections in the L hip and knee as well as the R hip and knee. Pt has cataracts; she will have surgery in Sept.        Prior to Admission medications    Medication Sig Start Date End Date Taking? Authorizing Provider   ondansetron hcl (ZOFRAN) 4 mg tablet take 1 tablet by mouth every 8 hours 7/15/20  Yes Provider, Historical   levothyroxine (Synthroid) 75 mcg tablet Take 1 Tab by mouth Daily (before breakfast). 8/5/20  Yes Gloria Beard MD   hydroCHLOROthiazide (HYDRODIURIL) 25 mg tablet Take 1 Tab by mouth daily.  8/5/20  Yes Gloria Beard MD   dicyclomine (BENTYL) 10 mg capsule take 1 capsule by mouth daily if needed for ABDOMINAL CRAMPS 4/6/20  Yes Gloria Beard MD   oxyCODONE-acetaminophen (PERCOCET 10)  mg per tablet TK 1 T PO  TID PRN 1/23/20  Yes Provider, Historical   tiZANidine (ZANAFLEX) 4 mg tablet  1/30/20  Yes Provider, Historical   traZODone (DESYREL) 50 mg tablet take 1 tablet by mouth at bedtime if needed 2/3/20  Yes Emiliana Mendosa MD   promethazine (PHENERGAN) 12.5 mg tablet Take 1 Tab by mouth every eight (8) hours as needed for Nausea. 8/19/19  Yes Dahlia Montalvo MD   naloxone (NARCAN) 1 mg/mL injection 1 mL by IntraMUSCular route once as needed for up to 1 dose. 11/8/17  Yes Emiliana Mendosa MD   CYANOCOBALAMIN, VITAMIN B-12, (VITAMIN B-12 PO) Take 1 tablet by mouth as needed. Yes Provider, Historical   dicyclomine (BENTYL) 20 mg tablet take 1 tablet by mouth four times a day 7/9/20   Provider, Historical   omeprazole (PRILOSEC) 40 mg capsule Take 1 Cap by mouth two (2) times a day.  12/20/19   Dahlia Montalvo MD   ondansetron hcl (ZOFRAN) 8 mg tablet take 1 tablet by mouth every 12 hours if needed 11/15/19   Dahlia Montalvo MD     Patient Active Problem List   Diagnosis Code    S/P gastric bypass - date of surgery 02/10 Z98.84    Cervical disc disease with myelopathy M50.00    S/P cervical discectomy Z98.890    Status post cervical spinal arthrodesis Z98.1    Chronic pain syndrome G89.4    Postlaminectomy syndrome, cervical region M96.1    Myalgia and myositis, unspecified [id number]    Encounter for postoperative care Z48.89    Thyroid disease E07.9    Essential hypertension I10    Hypercholesterolemia E78.00    Musculoskeletal pain M79.18    DDD (degenerative disc disease), cervical M50.30    Status post cervical spinal fusion Z98.1    Cervicogenic headache R51    Cervical dystonia G24.3    Neck pain M54.2    Esophageal dysfunction K22.4    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    Supraventricular tachycardia (HCC) I47.1    Gastroesophageal reflux disease with esophagitis K21.0    Anxiety F41.9    Weakness generalized R53.1    Other chest pain R07.89    Generalized abdominal pain R10.84    Other headache syndrome G44.89    Non-intractable vomiting with nausea R11.2    Hypertension, essential I10    Malaise R53.81    Atrial fibrillation with RVR (HCC) I48.91    Gastroesophageal reflux disease without esophagitis K21.9    Nausea R11.0    Frequent headaches R51     Current Outpatient Medications   Medication Sig Dispense Refill    ondansetron hcl (ZOFRAN) 4 mg tablet take 1 tablet by mouth every 8 hours      levothyroxine (Synthroid) 75 mcg tablet Take 1 Tab by mouth Daily (before breakfast). 30 Tab 5    hydroCHLOROthiazide (HYDRODIURIL) 25 mg tablet Take 1 Tab by mouth daily. 30 Tab 5    dicyclomine (BENTYL) 10 mg capsule take 1 capsule by mouth daily if needed for ABDOMINAL CRAMPS 30 Cap 0    oxyCODONE-acetaminophen (PERCOCET 10)  mg per tablet TK 1 T PO  TID PRN      tiZANidine (ZANAFLEX) 4 mg tablet       traZODone (DESYREL) 50 mg tablet take 1 tablet by mouth at bedtime if needed 90 Tab 1    promethazine (PHENERGAN) 12.5 mg tablet Take 1 Tab by mouth every eight (8) hours as needed for Nausea. 30 Tab 0    naloxone (NARCAN) 1 mg/mL injection 1 mL by IntraMUSCular route once as needed for up to 1 dose. 1 Syringe 0    CYANOCOBALAMIN, VITAMIN B-12, (VITAMIN B-12 PO) Take 1 tablet by mouth as needed.  dicyclomine (BENTYL) 20 mg tablet take 1 tablet by mouth four times a day      omeprazole (PRILOSEC) 40 mg capsule Take 1 Cap by mouth two (2) times a day.  60 Cap 5    ondansetron hcl (ZOFRAN) 8 mg tablet take 1 tablet by mouth every 12 hours if needed 30 Tab 0     Allergies   Allergen Reactions    Codeine Nausea and Vomiting    Pcn [Penicillins] Not Reported This Time and Hives    Tramadol Palpitations    Vicodin [Hydrocodone-Acetaminophen] Itching and Hives     Past Medical History:   Diagnosis Date    Anxiety disorder     Arthritis     Cervical dystonia 9/5/2014    Cervical pain     Cervicogenic headache 9/5/2014    Chronic pain     knee  DDD (degenerative disc disease), cervical 2014    Essential hypertension     Fibrillation, atrial (HCC)     Fibromyalgia     GERD (gastroesophageal reflux disease)     \"malfunctioning esophagus\"    Headache(784.0)     Hepatic steatosis     Herniated disc, cervical     Hypercholesterolemia     Hypertension     Joint pain     JRA [facility]juvenile rheumatoid arthritis) ([facility] [address]     Muscle pain     Musculoskeletal pain 2014    Non-intractable vomiting with nausea 2019    Numbness and tingling of left arm and leg     Status post cervical spinal fusion 2014    Thyroid disease     Vision decreased      Past Surgical History:   Procedure Laterality Date    COLONOSCOPY N/A 2017    COLONOSCOPY[facility] DIAGNOSTIC performed [facility] ENDOSCOPY    HX CERVICAL LAMINECTOMY  13    C3-4 ACDF     HX COLONOSCOPY      HX GASTRIC BYPASS  02/03/10    HX LUMBAR LAMINECTOMY      HX ORTHOPAEDIC  2013    Spinal SurgeryC-2 AND C-3    HX OTHER SURGICAL      GIST tumor resected    HX OTHER SURGICAL      teeth extractions    HX PARTIAL HYSTERECTOMY      [place] EXCISION TUMOR SOFT TISS FACE/SCALP SUBQ < 2CM N/A 2016    Dr. Sandee Stein     Family History   Problem Relation Age of Onset    Hypertension Mother     Heart Failure Mother     Hypertension Father     Arthritis-osteo Father      Social History     Tobacco Use    Smoking status: Former Smoker     Packs/day: 1.00     Years: 5.00     Pack years: 5.00     Types: Cigarettes     Last attempt to quit: 1989     Years since quittin.6    Smokeless tobacco: Never Used   Substance Use Topics    Alcohol use: No       Review of Systems   Constitutional: Negative. HENT: Negative. Respiratory: Negative. Cardiovascular: Negative. All other systems reviewed and are negative. No flowsheet data found.      Iona Cockayne, who was evaluated through a patient-initiated, synchronous (real-time) audio only encounter, and/or her healthcare decision maker, is aware that it is a billable service, with coverage as determined by her insurance carrier. She provided verbal consent to proceed: n/a- consent obtained within past 12 months. She has not had a related appointment within my department in the past 7 days or scheduled within the next 24 hours.       Total Time: minutes: 11-20 minutes    Junior Baig MD

## 2020-10-05 ENCOUNTER — VIRTUAL VISIT (OUTPATIENT)
Dept: FAMILY MEDICINE CLINIC | Age: 65
End: 2020-10-05
Payer: MEDICARE

## 2020-10-05 DIAGNOSIS — E07.9 THYROID DISEASE: ICD-10-CM

## 2020-10-05 DIAGNOSIS — I10 HYPERTENSION, ESSENTIAL: Primary | ICD-10-CM

## 2020-10-05 DIAGNOSIS — I48.91 ATRIAL FIBRILLATION WITH RVR (HCC): ICD-10-CM

## 2020-10-05 DIAGNOSIS — E55.9 VITAMIN D DEFICIENCY: ICD-10-CM

## 2020-10-05 DIAGNOSIS — G47.00 INSOMNIA, UNSPECIFIED TYPE: ICD-10-CM

## 2020-10-05 PROCEDURE — 99442 PR PHYS/QHP TELEPHONE EVALUATION 11-20 MIN: CPT | Performed by: FAMILY MEDICINE

## 2020-10-05 RX ORDER — TRAZODONE HYDROCHLORIDE 50 MG/1
TABLET ORAL
Qty: 90 TAB | Refills: 1 | Status: SHIPPED | OUTPATIENT
Start: 2020-10-05 | End: 2020-12-29 | Stop reason: SDUPTHER

## 2020-10-05 NOTE — PROGRESS NOTES
Shoaib Reeves is a 72 y.o. female, evaluated via audio-only technology on 10/5/2020 for Hypertension (Follow up); Thyroid Problem (Follow up); Vitamin D Deficiency (Follow up); and GERD (Follow up)  . Assessment & Plan:   Diagnoses and all orders for this visit:    1. Hypertension, essential  Stable, cont pres tx plan. Labs pending    2. Insomnia, unspecified type  -     traZODone (DESYREL) 50 mg tablet; take 1 tablet by mouth at bedtime if needed    3. Atrial fibrillation with RVR ([address]  Cards info given again  Pt to call to sched appt    4. Vitamin D deficiency  Labs pending    5. Thyroid disease  Labs pending    The complexity of medical decision making for this visit is moderate   Follow-up and Dispositions    · Return in about 2-3 months (around 12/5/2020) for high blood pressure, thyroid disease, vit D deficiency, lab review. MWV and flu shot asap. 12  Subjective:   Pt is doing well overall. Pt has not yet done her labs. She will do it tomorrow at [facility]. Home bp readings are normotensive. Pt did not call cards. Pt would like to get the contact info again. Pt did not have her cataract surgery. She will with ophtho on 10/20/2020. Pt cont to have gi issues with nausea, vomiting, and diarrhea. She is following with gi for this. Pt is seeing pain management for her arthritis. Pt needs a refill of trazodone for sleep. Prior to Admission medications    Medication Sig Start Date End Date Taking? Authorizing Provider   traZODone (DESYREL) 50 mg tablet take 1 tablet by mouth at bedtime if needed 10/5/20  Yes Lexy Ramsay MD   dicyclomine (BENTYL) 20 mg tablet take 1 tablet by mouth four times a day 7/9/20  Yes Provider, Historical   levothyroxine (Synthroid) 75 mcg tablet Take 1 Tab by mouth Daily (before breakfast). 8/5/20  Yes Lexy Ramsay MD   hydroCHLOROthiazide (HYDRODIURIL) 25 mg tablet Take 1 Tab by mouth daily.  8/5/20  Yes Lexy Ramsay MD dicyclomine (BENTYL) 10 mg capsule take 1 capsule by mouth daily if needed for ABDOMINAL CRAMPS 4/6/20  Yes Swapnil Da Silva MD   tiZANidine (ZANAFLEX) 4 mg tablet  1/30/20  Yes Provider, Historical   omeprazole (PRILOSEC) 40 mg capsule Take 1 Cap by mouth two (2) times a day. 12/20/19  Yes Swapnil Da Silva MD   ondansetron hcl (ZOFRAN) 8 mg tablet take 1 tablet by mouth every 12 hours if needed 11/15/19  Yes Laura Mendosa MD   naloxone (NARCAN) 1 mg/mL injection 1 mL by IntraMUSCular route once as needed for up to 1 dose. 11/8/17  Yes Laura Mendosa MD   CYANOCOBALAMIN, VITAMIN B-12, (VITAMIN B-12 PO) Take 1 tablet by mouth as needed. Yes Provider, Historical   ondansetron hcl (ZOFRAN) 4 mg tablet take 1 tablet by mouth every 8 hours 7/15/20   Provider, Historical   oxyCODONE-acetaminophen (PERCOCET 10)  mg per tablet TK 1 T PO  TID PRN 1/23/20   Provider, Historical   promethazine (PHENERGAN) 12.5 mg tablet Take 1 Tab by mouth every eight (8) hours as needed for Nausea.  8/19/19   Swapnil Da Silva MD     Patient Active Problem List   Diagnosis Code    S/P gastric bypass - date of surgery 02/10 Z98.84    Cervical disc disease with myelopathy M50.00    S/P cervical discectomy Z98.890    Status post cervical spinal arthrodesis Z98.1    Chronic pain syndrome G89.4    Postlaminectomy syndrome, cervical region M96.1    Myalgia and myositis, unspecified [id number]    Encounter for postoperative care Z48.89    Thyroid disease E07.9    Essential hypertension I10    Hypercholesterolemia E78.00    Musculoskeletal pain M79.18    DDD (degenerative disc disease), cervical M50.30    Status post cervical spinal fusion Z98.1    Cervicogenic headache R51.9    Cervical dystonia G24.3    Neck pain M54.2    Esophageal dysfunction K22.4    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    Supraventricular tachycardia (HCC) I47.1    Gastroesophageal reflux disease with esophagitis K21.00    Anxiety F41.9    Weakness generalized R53.1    Other chest pain R07.89    Generalized abdominal pain R10.84    Other headache syndrome G44.89    Non-intractable vomiting with nausea R11.2    Hypertension, essential I10    Malaise R53.81    Atrial fibrillation with RVR (HCC) I48.91    Gastroesophageal reflux disease without esophagitis K21.9    Nausea R11.0    Frequent headaches R51.9     Current Outpatient Medications   Medication Sig Dispense Refill    traZODone (DESYREL) 50 mg tablet take 1 tablet by mouth at bedtime if needed 90 Tab 1    dicyclomine (BENTYL) 20 mg tablet take 1 tablet by mouth four times a day      levothyroxine (Synthroid) 75 mcg tablet Take 1 Tab by mouth Daily (before breakfast). 30 Tab 5    hydroCHLOROthiazide (HYDRODIURIL) 25 mg tablet Take 1 Tab by mouth daily. 30 Tab 5    dicyclomine (BENTYL) 10 mg capsule take 1 capsule by mouth daily if needed for ABDOMINAL CRAMPS 30 Cap 0    tiZANidine (ZANAFLEX) 4 mg tablet       omeprazole (PRILOSEC) 40 mg capsule Take 1 Cap by mouth two (2) times a day. 60 Cap 5    ondansetron hcl (ZOFRAN) 8 mg tablet take 1 tablet by mouth every 12 hours if needed 30 Tab 0    naloxone (NARCAN) 1 mg/mL injection 1 mL by IntraMUSCular route once as needed for up to 1 dose. 1 Syringe 0    CYANOCOBALAMIN, VITAMIN B-12, (VITAMIN B-12 PO) Take 1 tablet by mouth as needed.  ondansetron hcl (ZOFRAN) 4 mg tablet take 1 tablet by mouth every 8 hours      oxyCODONE-acetaminophen (PERCOCET 10)  mg per tablet TK 1 T PO  TID PRN      promethazine (PHENERGAN) 12.5 mg tablet Take 1 Tab by mouth every eight (8) hours as needed for Nausea.  30 Tab 0     Allergies   Allergen Reactions    Codeine Nausea and Vomiting    Pcn [Penicillins] Not Reported This Time and Hives    Tramadol Palpitations    Vicodin [Hydrocodone-Acetaminophen] Itching and Hives     Past Medical History:   Diagnosis Date    Anxiety disorder     Arthritis     Cervical dystonia 9/5/2014    Cervical pain     Cervicogenic headache 2014    Chronic pain     knee    DDD (degenerative disc disease), cervical 2014    Essential hypertension     Fibrillation, atrial (HCC)     Fibromyalgia     GERD (gastroesophageal reflux disease)     \"malfunctioning esophagus\"    Headache(784.0)     Hepatic steatosis     Herniated disc, cervical     Hypercholesterolemia     Hypertension     Joint pain     JRA (juvenile rheumatoid arthritis) ([facility] [address]     Muscle pain     Musculoskeletal pain 2014    Non-intractable vomiting with nausea 2019    Numbness and tingling of left arm and leg     Status post cervical spinal fusion 2014    Thyroid disease     Vision decreased      Past Surgical History:   Procedure Laterality Date    COLONOSCOPY N/A 2017    COLONOSCOPY, DIAGNOSTIC performed by [facility]chariah Ayoub [facility] at [facility] ENDOSCOPY    HX CERVICAL LAMINECTOMY  13    C3-4 ACDF     HX COLONOSCOPY      HX GASTRIC BYPASS  02/03/10    HX LUMBAR LAMINECTOMY      HX ORTHOPAEDIC  2013    Spinal SurgeryC-2 AND C-3    HX OTHER SURGICAL      GIST tumor resected    HX OTHER SURGICAL      teeth extractions    HX PARTIAL HYSTERECTOMY      [place] EXCISION TUMOR SOFT TISS FACE/SCALP SUBQ < 2CM N/A 2016    Dr. Jack Saldana     Family History   Problem Relation Age of Onset    Hypertension Mother     Heart Failure Mother     Hypertension Father     Arthritis-osteo Father      Social History     Tobacco Use    Smoking status: Former Smoker     Packs/day: 1.00     Years: 5.00     Pack years: 5.00     Types: Cigarettes     Last attempt to quit: 1989     Years since quittin.7    Smokeless tobacco: Never Used   Substance Use Topics    Alcohol use: No       Review of Systems   Constitutional: Negative. HENT: Negative. Respiratory: Negative. Cardiovascular: Negative. Gastrointestinal: Positive for diarrhea, nausea and vomiting.    Psychiatric/Behavioral: The patient has insomnia. All other systems reviewed and are negative. No flowsheet data found. Mary Campbell, who was evaluated through a patient-initiated, synchronous (real-time) audio only encounter, and/or her healthcare decision maker, is aware that it is a billable service, with coverage as determined by her insurance carrier. She provided verbal consent to proceed: n/a- consent obtained within past 12 months. She has not had a related appointment within my department in the past 7 days or scheduled within the next 24 hours.       Total Time: minutes: 11-20 minutes    Elpidio Gonzales MD

## 2020-11-03 DIAGNOSIS — E07.9 THYROID DISEASE: ICD-10-CM

## 2020-11-04 RX ORDER — LEVOTHYROXINE SODIUM 75 UG/1
TABLET ORAL
Qty: 90 TAB | Refills: 1 | Status: SHIPPED | OUTPATIENT
Start: 2020-11-04

## 2020-11-11 ENCOUNTER — TELEPHONE (OUTPATIENT)
Dept: FAMILY MEDICINE CLINIC | Age: 65
End: 2020-11-11

## 2020-11-11 NOTE — TELEPHONE ENCOUNTER
Patient called and said that her  was exposed to someone who tested positive for COVID. Patient's 's doctor said that they both should quarantine for 2 wks. Patient was supposed to have a cardiology appointment, but has cancelled it. Patient wanted to make you aware of what is going on.

## 2020-11-19 ENCOUNTER — TELEPHONE (OUTPATIENT)
Dept: FAMILY MEDICINE CLINIC | Age: 65
End: 2020-11-19

## 2020-11-19 NOTE — TELEPHONE ENCOUNTER
Please let patient know she will need a  Clearance letter from cardiology before her scheduled appointment with me for preop. Thank you.

## 2020-11-30 ENCOUNTER — TELEPHONE (OUTPATIENT)
Dept: FAMILY MEDICINE CLINIC | Age: 65
End: 2020-11-30

## 2020-11-30 NOTE — TELEPHONE ENCOUNTER
Patient called in and needed to rescheduled her pre-op appointment to 12/03. Patient informed that she would have to get a clearance from her cardiologist before her appointment. Patient is to have her surgery on 12/16. Patient says that she cannot move her appointment unless it is for a good reason. Patient says that she could be penalized if she moves it. Informed patient to call her cardiologist, and let them know what is going on. Patient to call the cardiologist. Patient to call the office back.

## 2020-11-30 NOTE — TELEPHONE ENCOUNTER
Patient called and said that her cardiologist office is going to see if they can get her in before her appointment this week. She will call us back and let us know what they say if not then her surgery will have to be rescheduled.

## 2020-11-30 NOTE — TELEPHONE ENCOUNTER
Patient called and said that the cardiologist is going to fit her in tomorrow for like a consultation. Patient said that she would also get her blood work done. Patient informed that she could go to [facility] to get her blood work done. Is there any other blood work that should be done?

## 2020-12-01 NOTE — TELEPHONE ENCOUNTER
Does not need labs for preop. She does need routine labs prior to her follow-up with PCP, which can be ordered later on.

## 2020-12-03 ENCOUNTER — HOSPITAL ENCOUNTER (OUTPATIENT)
Dept: LAB | Age: 65
Discharge: HOME | End: 2020-12-03
Payer: MEDICARE

## 2020-12-03 ENCOUNTER — OFFICE VISIT (OUTPATIENT)
Dept: CARDIOLOGY CLINIC | Age: 65
End: 2020-12-03
Payer: MEDICARE

## 2020-12-03 VITALS
OXYGEN SATURATION: 99 % | SYSTOLIC BLOOD PRESSURE: 140 MMHG | HEART RATE: 68 BPM | WEIGHT: 150 LBS | BODY MASS INDEX: 24.99 KG/M2 | DIASTOLIC BLOOD PRESSURE: 88 MMHG | HEIGHT: 65 IN

## 2020-12-03 DIAGNOSIS — E55.9 VITAMIN D DEFICIENCY: ICD-10-CM

## 2020-12-03 DIAGNOSIS — I10 HYPERTENSION, ESSENTIAL: ICD-10-CM

## 2020-12-03 DIAGNOSIS — E07.9 THYROID DISEASE: ICD-10-CM

## 2020-12-03 DIAGNOSIS — Z98.84 S/P GASTRIC BYPASS: ICD-10-CM

## 2020-12-03 DIAGNOSIS — I48.91 ATRIAL FIBRILLATION WITH RVR (HCC): Primary | ICD-10-CM

## 2020-12-03 LAB
25(OH)D3 SERPL-MCNC: 9 NG/ML (ref 30–100)
ALBUMIN SERPL-MCNC: 3.6 G/DL (ref 3.4–5)
ALBUMIN/GLOB SERPL: 1 {RATIO} (ref 0.8–1.7)
ALP SERPL-CCNC: 116 U/L (ref 45–117)
ALT SERPL-CCNC: 26 U/L (ref 13–56)
ANION GAP SERPL CALC-SCNC: 5 MMOL/L (ref 3–18)
AST SERPL-CCNC: 31 U/L (ref 10–38)
BILIRUB SERPL-MCNC: 0.4 MG/DL (ref 0.2–1)
BUN SERPL-MCNC: 9 MG/DL (ref 7–18)
BUN/CREAT SERPL: 10 (ref 12–20)
CALCIUM SERPL-MCNC: 8.9 MG/DL (ref 8.5–10.1)
CHLORIDE SERPL-SCNC: 107 MMOL/L (ref 100–111)
CHOLEST SERPL-MCNC: 202 MG/DL
CO2 SERPL-SCNC: 29 MMOL/L (ref 21–32)
CREAT SERPL-MCNC: 0.92 MG/DL (ref 0.6–1.3)
GLOBULIN SER CALC-MCNC: 3.5 G/DL (ref 2–4)
GLUCOSE SERPL-MCNC: 81 MG/DL (ref 74–99)
HDLC SERPL-MCNC: 112 MG/DL (ref 40–60)
HDLC SERPL: 1.8 {RATIO} (ref 0–5)
LDLC SERPL CALC-MCNC: 74.4 MG/DL (ref 0–100)
LIPID PROFILE,FLP: ABNORMAL
POTASSIUM SERPL-SCNC: 4 MMOL/L (ref 3.5–5.5)
PROT SERPL-MCNC: 7.1 G/DL (ref 6.4–8.2)
SODIUM SERPL-SCNC: 141 MMOL/L (ref 136–145)
TRIGL SERPL-MCNC: 78 MG/DL (ref ?–150)
TSH SERPL DL<=0.05 MIU/L-ACNC: 0.73 UIU/ML (ref 0.36–3.74)
VLDLC SERPL CALC-MCNC: 15.6 MG/DL

## 2020-12-03 PROCEDURE — G8399 PT W/DXA RESULTS DOCUMENT: HCPCS | Performed by: INTERNAL MEDICINE

## 2020-12-03 PROCEDURE — G8754 DIAS BP LESS 90: HCPCS | Performed by: INTERNAL MEDICINE

## 2020-12-03 PROCEDURE — 93000 ELECTROCARDIOGRAM COMPLETE: CPT | Performed by: INTERNAL MEDICINE

## 2020-12-03 PROCEDURE — 82306 VITAMIN D 25 HYDROXY: CPT

## 2020-12-03 PROCEDURE — 80053 COMPREHEN METABOLIC PANEL: CPT

## 2020-12-03 PROCEDURE — G8753 SYS BP > OR = 140: HCPCS | Performed by: INTERNAL MEDICINE

## 2020-12-03 PROCEDURE — 80061 LIPID PANEL: CPT

## 2020-12-03 PROCEDURE — 84443 ASSAY THYROID STIM HORMONE: CPT

## 2020-12-03 PROCEDURE — 99204 OFFICE O/P NEW MOD 45 MIN: CPT | Performed by: INTERNAL MEDICINE

## 2020-12-03 PROCEDURE — G8536 NO DOC ELDER MAL SCRN: HCPCS | Performed by: INTERNAL MEDICINE

## 2020-12-03 PROCEDURE — 36415 COLL VENOUS BLD VENIPUNCTURE: CPT

## 2020-12-03 PROCEDURE — G8432 DEP SCR NOT DOC, RNG: HCPCS | Performed by: INTERNAL MEDICINE

## 2020-12-03 PROCEDURE — G8420 CALC BMI NORM PARAMETERS: HCPCS | Performed by: INTERNAL MEDICINE

## 2020-12-03 PROCEDURE — G9899 SCRN MAM PERF RSLTS DOC: HCPCS | Performed by: INTERNAL MEDICINE

## 2020-12-03 PROCEDURE — G8427 DOCREV CUR MEDS BY ELIG CLIN: HCPCS | Performed by: INTERNAL MEDICINE

## 2020-12-03 NOTE — PROGRESS NOTES
HISTORY OF PRESENT ILLNESS  Elizabeth Cain is a 72 y.o. female. HPI    Patient presents for a new office visit. She has a past medical history significant for a single episode of paroxysmal atrial tachycardia which occurred 4 years ago which was diagnosed when she was hospitalized for another medical condition at [facility]. She also underwent an echocardiogram during her hospital stay which did not demonstrate any significant valvular heart disease, preserved LV function, EF 60%. She was initially discharged on sotalol for rhythm control and Eliquis for oral anticoagulation. The patient states that she stopped both of these medications after a few months. She has had no recurrence of the arrhythmia or heart palpitations since that time. She underwent a gastric bypass surgery in 2010 and lost almost 150 pounds. She does have mild hypertension which has been treated with HCTZ. She was referred here for preoperative cardiac evaluation prior to undergoing cataract surgery which is scheduled for later this month. Patient states that she remains very active trying to exercise almost every day. She will either walk outside for 30 to 60 minutes or rides on a stationary bike indoors for 30 to 60 minutes. She also does resistance training and stretching each and every day. She has not noted any exertional chest pain or shortness of breath. No leg swelling, no orthopnea, no PND. No major change in her  exercise tolerance or capacity over the past few years.     Past Medical History:   Diagnosis Date    Anxiety disorder     Arthritis     Cervical dystonia 9/5/2014    Cervical pain     Cervicogenic headache 9/5/2014    Chronic pain     knee    DDD (degenerative disc disease), cervical 9/5/2014    Essential hypertension     Fibrillation, atrial (HCC)     Fibromyalgia     GERD (gastroesophageal reflux disease)     \"malfunctioning esophagus\"    Headache(784.0)     Hepatic steatosis     Herniated disc, cervical     Hypercholesterolemia     Hypertension     Joint pain     [facility]juvenile [facility] arthritis) [facility])     Muscle pain     Musculoskeletal pain 9/5/2014    Non-intractable vomiting with nausea 6/4/2019    Numbness and tingling of left arm and leg     Status post cervical spinal fusion 9/5/2014    Thyroid disease     Vision decreased      Current Outpatient Medications   Medication Sig Dispense Refill    levothyroxine (SYNTHROID) 75 mcg tablet take 1 tablet by mouth once daily (BEFORE BREAKFAST) 90 Tab 1    traZODone (DESYREL) 50 mg tablet take 1 tablet by mouth at bedtime if needed 90 Tab 1    dicyclomine (BENTYL) 20 mg tablet take 1 tablet by mouth four times a day      hydroCHLOROthiazide (HYDRODIURIL) 25 mg tablet Take 1 Tab by mouth daily. 30 Tab 5    oxyCODONE-acetaminophen (PERCOCET 10)  mg per tablet TK 1 T PO  TID PRN      tiZANidine (ZANAFLEX) 4 mg tablet       omeprazole (PRILOSEC) 40 mg capsule Take 1 Cap by mouth two (2) times a day. 60 Cap 5    ondansetron hcl (ZOFRAN) 8 mg tablet take 1 tablet by mouth every 12 hours if needed 30 Tab 0    CYANOCOBALAMIN, VITAMIN B-12, (VITAMIN B-12 PO) Take 1 tablet by mouth as needed.  ondansetron hcl (ZOFRAN) 4 mg tablet take 1 tablet by mouth every 8 hours      dicyclomine (BENTYL) 10 mg capsule take 1 capsule by mouth daily if needed for ABDOMINAL CRAMPS 30 Cap 0    promethazine (PHENERGAN) 12.5 mg tablet Take 1 Tab by mouth every eight (8) hours as needed for Nausea. 30 Tab 0    naloxone (NARCAN) 1 mg/mL injection 1 mL by IntraMUSCular route once as needed for up to 1 dose.  1 Syringe 0     Allergies   Allergen Reactions    Codeine Nausea and Vomiting    Pcn [Penicillins] Not Reported This Time and Hives    Tramadol Palpitations    Vicodin [Hydrocodone-Acetaminophen] Itching and Hives      Social History     Tobacco Use    Smoking status: Former Smoker     Packs/day: 1.00     Years: 5.00     Pack years: 5.00 Types: Cigarettes     Last attempt to quit: 1989     Years since quittin.9    Smokeless tobacco: Never Used   Substance Use Topics    Alcohol use: No    Drug use: Yes     Types: Prescription, OTC     Family History   Problem Relation Age of Onset    Hypertension Mother     Heart Failure Mother     Hypertension Father     Arthritis-osteo Father          Review of Systems   Constitutional: Negative for chills, fever and weight loss. HENT: Negative for nosebleeds. Eyes: Negative for blurred vision and double vision. Respiratory: Negative for cough, shortness of breath and wheezing. Cardiovascular: Negative for chest pain, palpitations, orthopnea, claudication, leg swelling and PND. Gastrointestinal: Negative for abdominal pain, heartburn, nausea and vomiting. Genitourinary: Negative for dysuria and hematuria. Musculoskeletal: Negative for falls and myalgias. Skin: Negative for rash. Neurological: Negative for dizziness, focal weakness and headaches. Endo/Heme/Allergies: Does not bruise/bleed easily. Psychiatric/Behavioral: Negative for substance abuse. Visit Vitals  BP (!) 140/88   Pulse 68   Ht 5' 5\" (1.651 m)   Wt 68 kg (150 lb)   LMP 1988   SpO2 99%   BMI 24.96 kg/m²       Physical Exam   Constitutional: She is oriented to person, place, and time. She appears well-developed and well-nourished. HENT:   Head: Normocephalic and atraumatic. Eyes: Conjunctivae are normal.   Neck: Neck supple. No JVD present. Carotid bruit is not present. Cardiovascular: Normal rate, regular rhythm, S1 normal, S2 normal and normal pulses. Exam reveals no gallop. No murmur heard. Pulmonary/Chest: Effort normal and breath sounds normal. She has no wheezes. She has no rales. Abdominal: Soft. Bowel sounds are normal. There is no abdominal tenderness. Musculoskeletal:         General: No tenderness, deformity or edema.    Neurological: She is alert and oriented to person, place, and time. Skin: Skin is warm and dry. Psychiatric: She has a normal mood and affect. Her behavior is normal. Thought content normal.     EKG: Normal sinus rhythm, normal axis, normal QTc interval, low voltage in the precordial leads, no ST or T wave changes concerning for ischemia. No change compared to the previous EKG. ASSESSMENT and PLAN    Low risk from a cardiac standpoint to proceed with cataract surgery as scheduled later this month. No further cardiac testing needed for further risk ratification. History of paroxysmal atrial tachycardia. By history it sounds like an episode of lone atrial tachycardia in 2016. She was briefly on sotalol and Eliquis, but states stopped those medications after a few months. Without any recurrent symptoms, I do not see any reason to restart antiarrhythmic therapy or anticoagulation at this point. Borderline essential hypertension. Patient reports that she has been treated with HCTZ 25 mg daily which she only takes about 3 or 4 times a week. She was encouraged to take this medication on a daily basis. History of gastric bypass surgery. This occurred in 2010. She has lost a total of 150 pounds in weight. She states recently she came back another 15 pounds. She was encouraged to continue with lifestyle modification. Patient can follow-up in the future as needed.

## 2020-12-03 NOTE — LETTER
12/3/2020 3:32 PM 
 
Ms. Maria De Jesus Carroll [id number] [address] [address] Maria De Jesus Hart was seen in our office on 12/3/2020 for cardiac evaluation. From a cardiac standpoint she is cleared for cardiac surgery with Dr. Conchita Wade Please feel free to contact our office if you have any questions regarding this patient. Sincerely, Read MD Ulises

## 2020-12-07 NOTE — TELEPHONE ENCOUNTER
See letter in chart from Dr. Lutz December clearing patient from a cardiac standpoint. She has an appointment with you on 12/8.

## 2020-12-07 NOTE — PROGRESS NOTES
Preoperative Evaluation      SUBJECTIVE:       Date of Exam: 12/8/2020     Rosa Elena Posada is a 72 y.o. female 1955 who presents for preoperative evaluation.    Procedure/Surgery: right eye then left  Date of Procedure/Surgery: 12/16/2020 and 12/30/2020  Surgeon: Dr. Aldo Astorga: [id number] [employer]  Primary Physician: Denise Palmer MD   Latex Allergy: no    Hypertension-  Takes HCTZ only 3-4 times a week  Symptoms:  None  BP readings at home are unknown  Treatment: HCTZ 25 mg daily but only takes 3-4 times a week  Comorbid:  None  Admits to poor diet, had been eating \"country ham\" for the last few weeks, attributes her elevated BP to this    Afib/SVT-  Symptoms:  None  Treatment:  None but was previously treated with Sotalol and Eliquis for a few mos, which the patient stopped on her own  Has been evaluated by cardiology since, and no indication to restart those meds at this time per cards    Problem List:     Patient Active Problem List    Diagnosis Date Noted    Gastroesophageal reflux disease without esophagitis 07/08/2019    Nausea 07/08/2019    Frequent headaches 07/08/2019    Other chest pain 06/04/2019    Generalized abdominal pain 06/04/2019    Other headache syndrome 06/04/2019    Non-intractable vomiting with nausea 06/04/2019    Malaise 06/04/2019    Supraventricular tachycardia ([address] 01/30/2017    Gastroesophageal reflux disease with esophagitis 01/30/2017    Anxiety 01/30/2017    Weakness generalized 01/30/2017    Esophageal dysfunction 11/17/2016    Post-resection malabsorption 11/17/2016    Atrial fibrillation with RVR ([address] 11/02/2016    Neck pain 09/14/2015    Musculoskeletal pain 09/05/2014    DDD (degenerative disc disease), cervical 09/05/2014    Status post cervical spinal fusion 09/05/2014    Cervicogenic headache 09/05/2014    Cervical dystonia 09/05/2014    Thyroid disease     Essential hypertension     Hypercholesterolemia     Encounter for postoperative care 02/18/2014    Chronic pain syndrome 01/31/2014    Postlaminectomy syndrome, cervical region 01/31/2014    Myalgia and myositis, unspecified 01/31/2014    Cervical disc disease with myelopathy 05/24/2013    S/P cervical discectomy 05/24/2013    Status post cervical spinal arthrodesis 05/24/2013    S/P gastric bypass - date of surgery 02/10 05/29/2012     Medical History:     Past Medical History:   Diagnosis Date    Anxiety disorder     Arthritis     Cervical dystonia 9/5/2014    Cervical pain     Cervicogenic headache 9/5/2014    Chronic pain     knee    DDD (degenerative disc disease), cervical 9/5/2014    Essential hypertension     Fibrillation, atrial (HCC)     Fibromyalgia     GERD (gastroesophageal reflux disease)     \"malfunctioning esophagus\"    Headache(784.0)     Hepatic steatosis     Herniated disc, cervical     Hypercholesterolemia     Hypertension     Joint pain     JRA (juvenile rheumatoid arthritis) (HCC)     Muscle pain     Musculoskeletal pain 9/5/2014    Non-intractable vomiting with nausea 6/4/2019    Numbness and tingling of left arm and leg     Status post cervical spinal fusion 9/5/2014    Thyroid disease     Vision decreased      Allergies: Allergies   Allergen Reactions    Codeine Nausea and Vomiting    Pcn [Penicillins] Not Reported This Time and Hives    Tramadol Palpitations    Vicodin [Hydrocodone-Acetaminophen] Itching and Hives      Medications:     Current Outpatient Medications   Medication Sig    levothyroxine (SYNTHROID) 75 mcg tablet take 1 tablet by mouth once daily (BEFORE BREAKFAST)    traZODone (DESYREL) 50 mg tablet take 1 tablet by mouth at bedtime if needed    hydroCHLOROthiazide (HYDRODIURIL) 25 mg tablet Take 1 Tab by mouth daily.     dicyclomine (BENTYL) 10 mg capsule take 1 capsule by mouth daily if needed for ABDOMINAL CRAMPS    oxyCODONE-acetaminophen (PERCOCET 10)  mg per tablet TK 1 T PO  TID PRN    tiZANidine (ZANAFLEX) 4 mg tablet     omeprazole (PRILOSEC) 40 mg capsule Take 1 Cap by mouth two (2) times a day.  ondansetron hcl (ZOFRAN) 8 mg tablet take 1 tablet by mouth every 12 hours if needed    naloxone (NARCAN) 1 mg/mL injection 1 mL by IntraMUSCular route once as needed for up to 1 dose.  CYANOCOBALAMIN, VITAMIN B-12, (VITAMIN B-12 PO) Take 1 tablet by mouth as needed.  dicyclomine (BENTYL) 20 mg tablet take 1 tablet by mouth four times a day    ondansetron hcl (ZOFRAN) 4 mg tablet take 1 tablet by mouth every 8 hours    promethazine (PHENERGAN) 12.5 mg tablet Take 1 Tab by mouth every eight (8) hours as needed for Nausea. No current facility-administered medications for this visit.       Surgical History:     Past Surgical History:   Procedure Laterality Date    COLONOSCOPY N/A 2017    COLONOSCOPY, DIAGNOSTIC performed [facility] Liz [facility] ENDOSCOPY    HX CERVICAL LAMINECTOMY  13    C3-4 ACDF     HX COLONOSCOPY      HX GASTRIC BYPASS  02/03/10    HX LUMBAR LAMINECTOMY      HX ORTHOPAEDIC  2013    Spinal SurgeryC-2 AND C-3    HX OTHER SURGICAL      GIST tumor resected    HX OTHER SURGICAL      teeth extractions    HX PARTIAL HYSTERECTOMY      IN EXCISION TUMOR SOFT TISS FACE/SCALP SUBQ < 2CM N/A 2016    Dr. Swift Minor     Social History:     Social History     Socioeconomic History    Marital status:      Spouse name: Not on file    Number of children: Not on file    Years of education: Not on file    Highest education level: Not on file   Occupational History    Occupation: disabled     Employer: disability   Tobacco Use    Smoking status: Former Smoker     Packs/day: 1.00     Years: 5.00     Pack years: 5.00     Types: Cigarettes     Last attempt to quit: 1989     Years since quittin.9    Smokeless tobacco: Never Used   Substance and Sexual Activity    Alcohol use: No    Drug use: Yes     Types: Prescription, OTC       Recent use of: No recent use of aspirin (ASA), NSAIDS or steroids    Tetanus up to date: tetanus status unknown to the patient      Anesthesia Complications: None  History of abnormal bleeding : None  History of Blood Transfusions: no  Health Care Directive or Living Will: no    Review of Systems   Constitutional: Negative for chills, fever and malaise/fatigue. Respiratory: Negative for cough and shortness of breath. Cardiovascular: Negative for chest pain, palpitations and leg swelling. Gastrointestinal: Negative for abdominal pain, diarrhea, nausea and vomiting. Genitourinary: Negative for dysuria, frequency and urgency. Neurological: Negative for dizziness, tingling, weakness and headaches. OBJECTIVE:   BP (!) 170/88 (BP 1 Location: Left arm, BP Patient Position: Sitting)   Pulse 79   Temp 99 °F (37.2 °C) Comment: Rechecked 99.0 Provider informed and 3rd recheck was 98.6  Resp 20   Ht 5' 5\" (1.651 m)   Wt 140 lb (63.5 kg)   LMP 12/30/1988   BMI 23.30 kg/m²     Physical Exam  Constitutional:       General: She is not in acute distress. HENT:      Head: Normocephalic and atraumatic. Mouth/Throat:      Mouth: Mucous membranes are moist.      Pharynx: Oropharynx is clear. No oropharyngeal exudate or posterior oropharyngeal erythema. Eyes:      Extraocular Movements: Extraocular movements intact. Conjunctiva/sclera: Conjunctivae normal.      Pupils: Pupils are equal, round, and reactive to light. Neck:      Musculoskeletal: Normal range of motion and neck supple. Cardiovascular:      Rate and Rhythm: Normal rate and regular rhythm. Heart sounds: No murmur. No friction rub. No gallop. Pulmonary:      Effort: Pulmonary effort is normal. No respiratory distress. Breath sounds: No wheezing, rhonchi or rales. Abdominal:      General: There is no distension. Palpations: Abdomen is soft. There is no mass.       Tenderness: There is no abdominal tenderness. There is no guarding or rebound. Musculoskeletal: Normal range of motion. General: No swelling, tenderness or deformity. Right lower leg: No edema. Left lower leg: No edema. Skin:     General: Skin is warm and dry. Neurological:      General: No focal deficit present. Mental Status: She is alert and oriented to person, place, and time. Psychiatric:         Mood and Affect: Mood normal.         Thought Content: Thought content normal.         Judgment: Judgment normal.        DIAGNOSTICS:   1. EKG: EKG FINDINGS - N/A  2. CXR: N/A  3. Labs:   Lab Results   Component Value Date/Time    Sodium 141 12/03/2020 03:55 PM    Potassium 4.0 12/03/2020 03:55 PM    Chloride 107 12/03/2020 03:55 PM    CO2 29 12/03/2020 03:55 PM    Anion gap 5 12/03/2020 03:55 PM    Glucose 81 12/03/2020 03:55 PM    BUN 9 12/03/2020 03:55 PM    Creatinine 0.92 12/03/2020 03:55 PM    BUN/Creatinine ratio 10 (L) 12/03/2020 03:55 PM    GFR est AA >60 12/03/2020 03:55 PM    GFR est non-AA >60 12/03/2020 03:55 PM    Calcium 8.9 12/03/2020 03:55 PM    Bilirubin, total 0.4 12/03/2020 03:55 PM    ALT (SGPT) 26 12/03/2020 03:55 PM    Alk. phosphatase 116 12/03/2020 03:55 PM    Protein, total 7.1 12/03/2020 03:55 PM    Albumin 3.6 12/03/2020 03:55 PM    Globulin 3.5 12/03/2020 03:55 PM    A-G Ratio 1.0 12/03/2020 03:55 PM      Assessment & Plan:     Diagnoses and all orders for this visit:    1. Preoperative examination   Blood pressure elevated, will need to be better controlled prior to planned procedure   Surgeon's office notified today that patient has not been cleared   Perioperative cardiac risk 0.14% per Adela Righter Scale    2.  Essential hypertension   BP elevated, goal <130/80   Advised to start taking HCTZ on a daily basis and return for nurse visit in 2 weeks for re-evaluation   If BP remains elevated, plan to add losartan 25 mg daily and have patient follow-up in 4 weeks   BP will need to be close to at goal prior to procedure    3. Supraventricular tachycardia ([address]   Resolved    4. Atrial fibrillation with RVR (HCC)   No anti-arrhythmic or anticoagulation indicated at this time per cards   Had been cleared by cardiology for procedure but BP was in the 140s at that appointment    Follow-up and Dispositions    · Return in about 1 week (around 12/15/2020) for AWV, lab results.        Chidi Silva NP   12/8/2020

## 2020-12-08 ENCOUNTER — OFFICE VISIT (OUTPATIENT)
Dept: FAMILY MEDICINE CLINIC | Age: 65
End: 2020-12-08
Payer: MEDICARE

## 2020-12-08 ENCOUNTER — TELEPHONE (OUTPATIENT)
Dept: FAMILY MEDICINE CLINIC | Age: 65
End: 2020-12-08

## 2020-12-08 VITALS
RESPIRATION RATE: 20 BRPM | WEIGHT: 140 LBS | HEART RATE: 79 BPM | BODY MASS INDEX: 23.32 KG/M2 | DIASTOLIC BLOOD PRESSURE: 88 MMHG | HEIGHT: 65 IN | TEMPERATURE: 99 F | SYSTOLIC BLOOD PRESSURE: 170 MMHG

## 2020-12-08 DIAGNOSIS — Z01.818 PREOPERATIVE EXAMINATION: Primary | ICD-10-CM

## 2020-12-08 DIAGNOSIS — I10 ESSENTIAL HYPERTENSION: ICD-10-CM

## 2020-12-08 DIAGNOSIS — I48.91 ATRIAL FIBRILLATION WITH RVR (HCC): ICD-10-CM

## 2020-12-08 DIAGNOSIS — I47.1 SUPRAVENTRICULAR TACHYCARDIA (HCC): ICD-10-CM

## 2020-12-08 PROCEDURE — 99214 OFFICE O/P EST MOD 30 MIN: CPT | Performed by: NURSE PRACTITIONER

## 2020-12-08 NOTE — PROGRESS NOTES
Mary Campbell presents today for   Chief Complaint   Patient presents with    Pre-op Exam     Pre op exam for cataract surgery. Virtual/telephone visit    Depression Screening:  3 most recent PHQ Screens 2/3/2020   Little interest or pleasure in doing things Not at all   Feeling down, depressed, irritable, or hopeless Not at all   Total Score PHQ 2 0       Learning Assessment:  Learning Assessment 2/3/2020   PRIMARY LEARNER Patient   HIGHEST LEVEL OF EDUCATION - PRIMARY LEARNER  SOME COLLEGE   BARRIERS PRIMARY LEARNER NONE   CO-LEARNER CAREGIVER No   PRIMARY LANGUAGE ENGLISH   LEARNER PREFERENCE PRIMARY LISTENING     -     -     -     -   ANSWERED BY self   RELATIONSHIP SELF       Fall Risk  Fall Risk Assessment, last 12 mths 2/3/2020   Able to walk? Yes   Fall in past 12 months? No       Travel Screening:   Travel Screening     Question   Response    In the last month, have you been in contact with someone who was confirmed or suspected to have Coronavirus / COVID-19? No / Unsure    Have you had a COVID-19 viral test in the last 14 days? No    Do you have any of the following new or worsening symptoms? Runny nose (States that she had a sinus infection about 3 weeks ago. No nasal symptoms for 3 weeks. Mild s)    Have you traveled internationally in the last month? No      Travel History   Travel since 11/08/20     No documented travel since 11/08/20          Health Maintenance reviewed and discussed and ordered per Provider. Health Maintenance Due   Topic Date Due    DTaP/Tdap/Td series (1 - Tdap) 01/29/1976    Shingrix Vaccine Age 50> (2 of 2) 01/10/2020    GLAUCOMA SCREENING Q2Y  01/29/2020    Pneumococcal 65+ years (2 of 2 - PPSV23) 01/29/2020    Medicare Yearly Exam  07/22/2020    Flu Vaccine (1) 09/01/2020   . Coordination of Care:  1. Have you been to the ER, urgent care clinic since your last visit? Hospitalized since your last visit? No    2.  Have you seen or consulted any other health care providers outside of the [address] [place] [address] since your last visit? Include any pap smears or colon screening.  No

## 2020-12-08 NOTE — TELEPHONE ENCOUNTER
Please fax my notes from today to surgeon's office at [phone] [facility], Dr. Dany Santillan). Patient not cleared for procedure until BP normalizes. Thanks.

## 2020-12-11 NOTE — PROGRESS NOTES
Elizabeth Cain is a 72 y.o. female who was evaluated via audio-only technology on 12/29/2020 for lab results, hypertension, hyperlipidemia, insomnia, COVID-19    Assessment & Plan:   Diagnoses and all orders for this visit:    1. Essential hypertension   Stable, goal <130/80, continue regimen  -     METABOLIC PANEL, COMPREHENSIVE; Future    2. Hyperlipidemia LDL goal <100   Stable, continue regimen and recheck labs in 3 mos  -     LIPID PANEL; Future    3. Vitamin D deficiency  -    START cholecalciferol (VITAMIN D3) (50,000 UNITS /1250 MCG) capsule; Take 1 Cap by mouth every seven (7) days. Indications: low vitamin D levels  -     VITAMIN D, 25 HYDROXY; Future  - Recheck level in 3 mos    4. COVID-19   Continue supportive measures    5. Insomnia, unspecified type   Stable on regimen, continue  -     traZODone (DESYREL) 50 mg tablet; take 1 tablet by mouth at bedtime if needed    Follow-up and Dispositions    · Return in about 3 months (around 3/29/2021) for vit d deficiency, HTN, Hyperlipidemia, hypothyroidism, lab results. SUBJECTIVE:     HPI    COVID-19-  Onset:  Two weeks ago  Tested at [facility] 12/27/2020  Still feeling fatigued, poor appetite, sense of smell has not returned  She is unsure where she contracted the virus  Never ran a fever but has occasional SOB  She found out her patient had it and this is what prompted her to get tested    Hypertension-  Compliant with meds  Symptoms:  None  BP readings at home are in the 125K to 494E systolic  Treatment: HCTZ 25 mg daily  Comorbid:  HLD  Was seen on 12/03/2020 for preop clearance for cataract surgery, which was postponed d/t elevated BP. Patient was advised to take medication on a daily basis and return for a follow-up.     Hyperlipidemia  Treatment:  None  Comorbid:  HTN  Lab Results   Component Value Date/Time    Cholesterol, total 202 (H) 12/03/2020 03:55 PM    HDL Cholesterol 112 (H) 12/03/2020 03:55 PM    LDL, calculated 74.4 12/03/2020 03:55 PM    VLDL, calculated 15.6 12/03/2020 03:55 PM    Triglyceride 78 12/03/2020 03:55 PM    CHOL/HDL Ratio 1.8 12/03/2020 03:55 PM     Vitamin D Deficiency-  Symptoms:  Fatigue   Treatment: None  Risk factors:  Age    Insomnia -  Onset:  Years ago  Symptoms:  Problems with falling asleep and staying asleep  Treatment:  Trazodone 50 mg nightly PRN    Health Maintenance:  Flu vac - due now, recommended  Tetanus vac - due now, recommended  Last eye exam - September 2020    Patient Active Problem List   Diagnosis Code    S/P gastric bypass - date of surgery 02/10 Z98.84    Cervical disc disease with myelopathy M50.00    S/P cervical discectomy Z98.890    Status post cervical spinal arthrodesis Z98.1    Chronic pain syndrome G89.4    Postlaminectomy syndrome, cervical region M96.1    Myalgia and myositis, unspecified [id number]    Encounter for postoperative care Z48.89    Thyroid disease E07.9    Essential hypertension I10    Hypercholesterolemia E78.00    Musculoskeletal pain M79.18    DDD (degenerative disc disease), cervical M50.30    Status post cervical spinal fusion Z98.1    Cervicogenic headache R51.9    Cervical dystonia G24.3    Neck pain M54.2    Esophageal dysfunction K22.4    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    Supraventricular tachycardia (HCC) I47.1    Gastroesophageal reflux disease with esophagitis K21.00    Anxiety F41.9    Weakness generalized R53.1    Other chest pain R07.89    Generalized abdominal pain R10.84    Other headache syndrome G44.89    Non-intractable vomiting with nausea R11.2    Malaise R53.81    Atrial fibrillation with RVR (HCC) I48.91    Gastroesophageal reflux disease without esophagitis K21.9    Nausea R11.0    Frequent headaches R51.9     Current Outpatient Medications   Medication Sig Dispense Refill    cholecalciferol (VITAMIN D3) (50,000 UNITS /1250 MCG) capsule Take 1 Cap by mouth every seven (7) days.  Indications: low vitamin D levels 12 Cap 0    traZODone (DESYREL) 50 mg tablet take 1 tablet by mouth at bedtime if needed 90 Tab 0    levothyroxine (SYNTHROID) 75 mcg tablet take 1 tablet by mouth once daily (BEFORE BREAKFAST) 90 Tab 1    dicyclomine (BENTYL) 20 mg tablet take 1 tablet by mouth four times a day      ondansetron hcl (ZOFRAN) 4 mg tablet take 1 tablet by mouth every 8 hours      hydroCHLOROthiazide (HYDRODIURIL) 25 mg tablet Take 1 Tab by mouth daily. 30 Tab 5    dicyclomine (BENTYL) 10 mg capsule take 1 capsule by mouth daily if needed for ABDOMINAL CRAMPS 30 Cap 0    oxyCODONE-acetaminophen (PERCOCET 10)  mg per tablet TK 1 T PO  TID PRN      tiZANidine (ZANAFLEX) 4 mg tablet       omeprazole (PRILOSEC) 40 mg capsule Take 1 Cap by mouth two (2) times a day. 60 Cap 5    ondansetron hcl (ZOFRAN) 8 mg tablet take 1 tablet by mouth every 12 hours if needed 30 Tab 0    naloxone (NARCAN) 1 mg/mL injection 1 mL by IntraMUSCular route once as needed for up to 1 dose. 1 Syringe 0    CYANOCOBALAMIN, VITAMIN B-12, (VITAMIN B-12 PO) Take 1 tablet by mouth as needed.  promethazine (PHENERGAN) 12.5 mg tablet Take 1 Tab by mouth every eight (8) hours as needed for Nausea.  30 Tab 0     Allergies   Allergen Reactions    Codeine Nausea and Vomiting    Pcn [Penicillins] Not Reported This Time and Hives    Tramadol Palpitations    Vicodin [Hydrocodone-Acetaminophen] Itching and Hives      Past Medical History:   Diagnosis Date    Anxiety disorder     Arthritis     Cervical dystonia 9/5/2014    Cervical pain     Cervicogenic headache 9/5/2014    Chronic pain     knee    DDD (degenerative disc disease), cervical 9/5/2014    Essential hypertension     Fibrillation, atrial (HCC)     Fibromyalgia     GERD (gastroesophageal reflux disease)     \"malfunctioning esophagus\"    Headache(784.0)     Hepatic steatosis     Herniated disc, cervical     Hypercholesterolemia     Hypertension     Joint pain     JRA (juvenile rheumatoid arthritis) (HCC)     Muscle pain     Musculoskeletal pain 2014    Non-intractable vomiting with nausea 2019    Numbness and tingling of left arm and leg     Status post cervical spinal fusion 2014    Thyroid disease     Vision decreased       Social History     Socioeconomic History    Marital status:      Spouse name: Not on file    Number of children: Not on file    Years of education: Not on file    Highest education level: Not on file   Occupational History    Occupation: disabled     Employer: disability   Social Needs    Financial resource strain: Not on file    Food insecurity     Worry: Not on file     Inability: Not on file   [employer] needs     Medical: Not on file     Non-medical: Not on file   Tobacco Use    Smoking status: Former Smoker     Packs/day: 1.00     Years: 5.00     Pack years: 5.00     Types: Cigarettes     Quit date: 1989     Years since quittin.0    Smokeless tobacco: Never Used   Substance and Sexual Activity    Alcohol use: No    Drug use: Yes     Types: Prescription, OTC    Sexual activity: Not on file   Lifestyle    Physical activity     Days per week: Not on file     Minutes per session: Not on file    Stress: Not on file   Relationships    Social connections     Talks on phone: Not on file     Gets together: Not on file     Attends Restorationism service: Not on file     Active member of club or organization: Not on file     Attends meetings of clubs or organizations: Not on file     Relationship status: Not on file    Intimate partner violence     Fear of current or ex partner: Not on file     Emotionally abused: Not on file     Physically abused: Not on file     Forced sexual activity: Not on file   Other Topics Concern    Not on file   Social History Narrative    Not on file      Past Surgical History:   Procedure Laterality Date    COLONOSCOPY N/A 2017    COLONOSCOPY, DIAGNOSTIC performed by Pilar Marte Yon Luong [facility] at [facility] ENDOSCOPY    HX CERVICAL LAMINECTOMY  5/21/13    C3-4 ACDF     HX COLONOSCOPY      HX GASTRIC BYPASS  02/03/10    HX LUMBAR LAMINECTOMY      HX ORTHOPAEDIC  2013    Spinal SurgeryC-2 AND C-3    HX OTHER SURGICAL      GIST tumor resected    HX OTHER SURGICAL      teeth extractions    HX PARTIAL HYSTERECTOMY  1988    OH EXCISION TUMOR SOFT TISS FACE/SCALP SUBQ < 2CM N/A 09/30/2016    Dr. Fracisco Colin      Family History   Problem Relation Age of Onset    Hypertension Mother     Heart Failure Mother     Hypertension Father     Arthritis-osteo Father         Review of Systems   Constitutional: Negative for chills, fever and malaise/fatigue. Respiratory: Positive for shortness of breath. Negative for cough. Cardiovascular: Negative for chest pain. Gastrointestinal: Positive for diarrhea and vomiting. Negative for nausea. Musculoskeletal: Positive for myalgias. Neurological: Positive for headaches. Negative for dizziness. OBJECTIVE:     Physical Exam   Constitutional: Alert and oriented, in no distress  HENT:   Ears:  Hearing grossly intact. Pulmonary/Chest: Does not sound dyspneic, no audible wheezes   Neurological:  Intact recent memory, answering questions appropriately. Psychiatric: Judgment and insight good, normal mood. We discussed the expected course, resolution and complications of the diagnosis(es) in detail. Medication risks, benefits, costs, interactions, and alternatives were discussed as indicated. I advised her to contact the office if her condition worsens, changes or fails to improve as anticipated. She expressed understanding with the diagnosis(es) and plan. Carly Scott, who was evaluated through a synchronous (real-time) audio only encounter, and/or her healthcare decision maker, is aware that it is a billable service, with coverage as determined by her insurance carrier.   provided verbal consent to proceed: Yes, and patient identification was verified. It was conducted pursuant to the emergency declaration under the [facility], [address] [place] [address] and the [employer] Act. A caregiver was present when appropriate. Ability to conduct physical exam was limited. I was in the office. The patient was at home.     Total time spent:  21-30 minutes    CRISTIN Colbert

## 2020-12-11 NOTE — PROGRESS NOTES
Advance Care Planning     Advance Care Planning (ACP) Physician/NP/PA Conversation      Date of Conversation: 12/29/2020  Conducted with: Patient with Decision Making Capacity    Healthcare Decision Maker:     Primary Decision Maker: Piter Hardwick - [phone]    Secondary Decision Maker: Leona Swann - [phone]  Click here to complete [place] including selection of the Healthcare Decision Maker Relationship (ie \"Primary\")  Today we documented Decision Maker(s). The patient will provide ACP documents. Care Preferences:    Hospitalization: \"If your health worsens and it becomes clear that your chance of recovery is unlikely, what would be your preference regarding hospitalization? \"  The patient would prefer hospitalization. Ventilation: \"If you were unable to breathe on your own and your chance of recovery was unlikely, what would be your preference about the use of a ventilator (breathing machine) if it was available to you? \"   The patient would desire the use of a ventilator. Resuscitation: \"In the event your heart stopped as a result of an underlying serious health condition, would you want attempts to be made to restart your heart, or would you prefer a natural death? \"   Yes, attempt to resuscitate.     Conversation Outcomes / Follow-Up Plan:   ACP in process - information provided, considering goals and options    Length of Voluntary ACP Conversation in minutes:  16 minutes    Florence Joseph NP

## 2020-12-29 ENCOUNTER — VIRTUAL VISIT (OUTPATIENT)
Dept: FAMILY MEDICINE CLINIC | Age: 65
End: 2020-12-29
Payer: MEDICARE

## 2020-12-29 DIAGNOSIS — Z71.89 ACP (ADVANCE CARE PLANNING): ICD-10-CM

## 2020-12-29 DIAGNOSIS — U07.1 COVID-19: ICD-10-CM

## 2020-12-29 DIAGNOSIS — G47.00 INSOMNIA, UNSPECIFIED TYPE: ICD-10-CM

## 2020-12-29 DIAGNOSIS — Z00.00 MEDICARE ANNUAL WELLNESS VISIT, INITIAL: Primary | ICD-10-CM

## 2020-12-29 DIAGNOSIS — E55.9 VITAMIN D DEFICIENCY: ICD-10-CM

## 2020-12-29 DIAGNOSIS — I10 ESSENTIAL HYPERTENSION: ICD-10-CM

## 2020-12-29 DIAGNOSIS — E78.5 HYPERLIPIDEMIA LDL GOAL <100: ICD-10-CM

## 2020-12-29 PROCEDURE — 99497 ADVNCD CARE PLAN 30 MIN: CPT | Performed by: NURSE PRACTITIONER

## 2020-12-29 PROCEDURE — 99443 PR PHYS/QHP TELEPHONE EVALUATION 21-30 MIN: CPT | Performed by: NURSE PRACTITIONER

## 2020-12-29 PROCEDURE — G0439 PPPS, SUBSEQ VISIT: HCPCS | Performed by: NURSE PRACTITIONER

## 2020-12-29 RX ORDER — TRAZODONE HYDROCHLORIDE 50 MG/1
TABLET ORAL
Qty: 90 TAB | Refills: 0 | Status: SHIPPED | OUTPATIENT
Start: 2020-12-29 | End: 2021-10-18 | Stop reason: SDUPTHER

## 2020-12-29 RX ORDER — ASPIRIN 325 MG
50000 TABLET, DELAYED RELEASE (ENTERIC COATED) ORAL
Qty: 12 CAP | Refills: 0 | Status: SHIPPED | OUTPATIENT
Start: 2020-12-29

## 2020-12-29 NOTE — PROGRESS NOTES
This is the Subsequent Medicare Annual Wellness Exam, performed 12 months or more after the Initial AWV or the last Subsequent AWV    I have reviewed the patient's medical history in detail and updated the computerized patient record. Depression Risk Factor Screening:     3 most recent PHQ Screens 2/3/2020   Little interest or pleasure in doing things Not at all   Feeling down, depressed, irritable, or hopeless Not at all   Total Score PHQ 2 0       Alcohol Risk Screen    Do you average more than 1 drink per night or more than 7 drinks a week:  No    On any one occasion in the past three months have you have had more than 3 drinks containing alcohol:  No        Functional Ability and Level of Safety:    Hearing: Hearing is good. Activities of Daily Living: The home contains: no safety equipment. Patient does total self care      Ambulation: with mild difficulty     Fall Risk:  Fall Risk Assessment, last 12 mths 2/3/2020   Able to walk? Yes   Fall in past 12 months? No      Abuse Screen:  Patient is not abused       Cognitive Screening    Has your family/caregiver stated any concerns about your memory: no    Assessment/Plan   Education and counseling provided:  Are appropriate based on today's review and evaluation  End-of-Life planning (with patient's consent)  Influenza Vaccine    Diagnoses and all orders for this visit:    1. Medicare annual wellness visit, initial    2. ACP (advance care planning)    Follow-up and Dispositions    · Return in about 3 months (around 3/29/2021) for vit d deficiency, HTN, Hyperlipidemia, hypothyroidism, lab results.        Health Maintenance Due     Health Maintenance Due   Topic Date Due    DTaP/Tdap/Td series (1 - Tdap) 01/29/1976    Flu Vaccine (1) 09/01/2020     Health Maintenance:  TDAP - due now, recommended  Flu vac - due now, will get once she is feeling better from having COVID 19    Patient Care Team   Patient Care Team:  Quiana Pagan MD as PCP - General (Family Medicine)  Sonali Leija MD as PCP - [address] [place] [address] Dr Quijano Provider  Jean Paul Kwon MD (General Surgery)  Aly Catalan MD (Plastic Surgery)  Rigoberto Hines MD as Physician (General Surgery)  Simon Butler MD (Physical Medicine and Rehabilitation)  Shayy Poole MD (Inactive) (Cardiology)    History     Patient Active Problem List   Diagnosis Code    S/P gastric bypass - date of surgery 02/10 Z98.84    Cervical disc disease with myelopathy M50.00    S/P cervical discectomy Z98.890    Status post cervical spinal arthrodesis Z98.1    Chronic pain syndrome G89.4    Postlaminectomy syndrome, cervical region M96.1    Myalgia and myositis, unspecified [id number]    Encounter for postoperative care Z48.89    Thyroid disease E07.9    Essential hypertension I10    Hypercholesterolemia E78.00    Musculoskeletal pain M79.18    DDD (degenerative disc disease), cervical M50.30    Status post cervical spinal fusion Z98.1    Cervicogenic headache R51.9    Cervical dystonia G24.3    Neck pain M54.2    Esophageal dysfunction K22.4    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    Supraventricular tachycardia (HCC) I47.1    Gastroesophageal reflux disease with esophagitis K21.00    Anxiety F41.9    Weakness generalized R53.1    Other chest pain R07.89    Generalized abdominal pain R10.84    Other headache syndrome G44.89    Non-intractable vomiting with nausea R11.2    Malaise R53.81    Atrial fibrillation with RVR (HCC) I48.91    Gastroesophageal reflux disease without esophagitis K21.9    Nausea R11.0    Frequent headaches R51.9     Past Medical History:   Diagnosis Date    Anxiety disorder     Arthritis     Cervical dystonia 9/5/2014    Cervical pain     Cervicogenic headache 9/5/2014    Chronic pain     knee    DDD (degenerative disc disease), cervical 9/5/2014    Essential hypertension     Fibrillation, atrial (HCC)     Fibromyalgia     GERD (gastroesophageal reflux disease)     \"malfunctioning esophagus\"    Headache(784.0)     Hepatic steatosis     Herniated disc, cervical     Hypercholesterolemia     Hypertension     Joint pain     JRA (juvenile rheumatoid arthritis) ([facility] [address]     Muscle pain     Musculoskeletal pain 9/5/2014    Non-intractable vomiting with nausea 6/4/2019    Numbness and tingling of left arm and leg     Status post cervical spinal fusion 9/5/2014    Thyroid disease     Vision decreased       Past Surgical History:   Procedure Laterality Date    COLONOSCOPY N/A 4/18/2017    [facility]  [facility]    C3-4 ACDF     HX COLONOSCOPY      HX GASTRIC BYPASS  02/03/10    HX LUMBAR LAMINECTOMY      HX ORTHOPAEDIC  2013    Spinal SurgeryC-2 AND C-3    HX OTHER SURGICAL      GIST tumor resected    HX OTHER SURGICAL      teeth extractions    HX PARTIAL HYSTERECTOMY  1988    HI EXCISION TUMOR SOFT TISS FACE/SCALP SUBQ < 2CM N/A 09/30/2016    Dr. Geo Cook     Current Outpatient Medications   Medication Sig Dispense Refill    cholecalciferol (VITAMIN D3) (50,000 UNITS /1250 MCG) capsule Take 1 Cap by mouth every seven (7) days. Indications: low vitamin D levels 12 Cap 0    traZODone (DESYREL) 50 mg tablet take 1 tablet by mouth at bedtime if needed 90 Tab 0    levothyroxine (SYNTHROID) 75 mcg tablet take 1 tablet by mouth once daily (BEFORE BREAKFAST) 90 Tab 1    dicyclomine (BENTYL) 20 mg tablet take 1 tablet by mouth four times a day      ondansetron hcl (ZOFRAN) 4 mg tablet take 1 tablet by mouth every 8 hours      hydroCHLOROthiazide (HYDRODIURIL) 25 mg tablet Take 1 Tab by mouth daily.  30 Tab 5    dicyclomine (BENTYL) 10 mg capsule take 1 capsule by mouth daily if needed for ABDOMINAL CRAMPS 30 Cap 0    oxyCODONE-acetaminophen (PERCOCET 10)  mg per tablet TK 1 T PO  TID PRN      tiZANidine (ZANAFLEX) 4 mg tablet       omeprazole (PRILOSEC) 40 mg capsule Take 1 Cap by mouth two (2) times a day. 60 Cap 5    ondansetron hcl (ZOFRAN) 8 mg tablet take 1 tablet by mouth every 12 hours if needed 30 Tab 0    naloxone (NARCAN) 1 mg/mL injection 1 mL by IntraMUSCular route once as needed for up to 1 dose. 1 Syringe 0    CYANOCOBALAMIN, VITAMIN B-12, (VITAMIN B-12 PO) Take 1 tablet by mouth as needed.  promethazine (PHENERGAN) 12.5 mg tablet Take 1 Tab by mouth every eight (8) hours as needed for Nausea. 30 Tab 0     Allergies   Allergen Reactions    Codeine Nausea and Vomiting    Pcn [Penicillins] Not Reported This Time and Hives    Tramadol Palpitations    Vicodin [Hydrocodone-Acetaminophen] Itching and Hives       Family History   Problem Relation Age of Onset    Hypertension Mother     Heart Failure Mother     Hypertension Father     Arthritis-osteo Father      Social History     Tobacco Use    Smoking status: Former Smoker     Packs/day: 1.00     Years: 5.00     Pack years: 5.00     Types: Cigarettes     Quit date: 1989     Years since quittin.0    Smokeless tobacco: Never Used   Substance Use Topics    Alcohol use: No       Nadiya Samson, who was evaluated through a synchronous (real-time) audio only encounter, and/or her healthcare decision maker, is aware that it is a billable service, with coverage as determined by her insurance carrier. She provided verbal consent to proceed: Yes, and patient identification was verified. It was conducted pursuant to the emergency declaration under the [address] [place] [address], [address] [place] [address] and the [employer] and [employer] Act. A caregiver was present when appropriate. Ability to conduct physical exam was limited. I was in the office. The patient was at home.     Total time spent:  21-30 minutes    Meek Torres NP

## 2020-12-29 NOTE — PATIENT INSTRUCTIONS
Medicare Wellness Visit, Female The best way to live healthy is to have a lifestyle where you eat a well-balanced diet, exercise regularly, limit alcohol use, and quit all forms of tobacco/nicotine, if applicable. Regular preventive services are another way to keep healthy. Preventive services (vaccines, screening tests, monitoring & exams) can help personalize your care plan, which helps you manage your own care. Screening tests can find health problems at the earliest stages, when they are easiest to treat. [facility] follows the current, evidence-based guidelines published by the [facility] Jack Torres ([facility]) when recommending preventive services for our patients. Because we follow these guidelines, sometimes recommendations change over time as research supports it. (For example, mammograms used to be recommended annually. Even though Medicare will still pay for an annual mammogram, the newer guidelines recommend a mammogram every two years for women of average risk). Of course, you and your doctor may decide to screen more often for some diseases, based on your risk and your co-morbidities (chronic disease you are already diagnosed with). Preventive services for you include: - Medicare offers their members a free annual wellness visit, which is time for you and your primary care provider to discuss and plan for your preventive service needs. Take advantage of this benefit every year! 
-All adults over the age of 72 should receive the recommended pneumonia vaccines. Current USPSTF guidelines recommend a series of two vaccines for the best pneumonia protection.  
-All adults should have a flu vaccine yearly and a tetanus vaccine every 10 years.  
-All adults age 48 and older should receive the shingles vaccines (series of two vaccines). -All adults age 38-68 who are overweight should have a diabetes screening test once every three years. -All adults born between [birth year] and 1965 should be screened once for Hepatitis C. 
-Other screening tests and preventive services for persons with diabetes include: an eye exam to screen for diabetic retinopathy, a kidney function test, a foot exam, and stricter control over your cholesterol.  
-Cardiovascular screening for adults with routine risk involves an electrocardiogram (ECG) at intervals determined by your doctor.  
-Colorectal cancer screenings should be done for adults age 54-65 with no increased risk factors for colorectal cancer. There are a number of acceptable methods of screening for this type of cancer. Each test has its own benefits and drawbacks. Discuss with your doctor what is most appropriate for you during your annual wellness visit. The different tests include: colonoscopy (considered the best screening method), a fecal occult blood test, a fecal DNA test, and sigmoidoscopy. 
 
-A bone mass density test is recommended when a woman turns 65 to screen for osteoporosis. This test is only recommended one time, as a screening. Some providers will use this same test as a disease monitoring tool if you already have osteoporosis. -Breast cancer screenings are recommended every other year for women of normal risk, age 54-69. 
-Cervical cancer screenings for women over age 72 are only recommended with certain risk factors. Here is a list of your current Health Maintenance items (your personalized list of preventive services) with a due date: 
Health Maintenance Due Topic Date Due  
 DTaP/Tdap/Td  (1 - Tdap) 01/29/1976  Glaucoma Screening   01/29/2020  Yearly Flu Vaccine (1) 09/01/2020

## 2020-12-29 NOTE — PROGRESS NOTES
Rob Espinoza presents today for   Chief Complaint   Patient presents with   [facility] Annual Wellness Visit     Medicare       Is someone accompanying this pt? no    Is the patient using any DME equipment during OV? no    Depression Screening:  3 most recent PHQ Screens 2/3/2020   Little interest or pleasure in doing things Not at all   Feeling down, depressed, irritable, or hopeless Not at all   Total Score PHQ 2 0       Learning Assessment:  Learning Assessment 2/3/2020   PRIMARY LEARNER Patient   HIGHEST LEVEL OF EDUCATION - PRIMARY LEARNER  [address] [place] [address] PRIMARY LEARNER NONE   CO-LEARNER CAREGIVER No   PRIMARY LANGUAGE ENGLISH   LEARNER PREFERENCE PRIMARY LISTENING     -     -     -     -   ANSWERED BY self   RELATIONSHIP SELF       Abuse Screening:  Abuse Screening Questionnaire 10/5/2020   Do you ever feel afraid of your partner? N   Are you in a relationship with someone who physically or mentally threatens you? N   Is it safe for you to go home? Y       Fall Screening  Fall Risk Assessment, last 12 mths 2/3/2020   Able to walk? Yes   Fall in past 12 months? No       Generalized Anxiety  No flowsheet data found. Health Maintenance Due   Topic Date Due    DTaP/Tdap/Td series (1 - Tdap) 01/29/1976    GLAUCOMA SCREENING Q2Y  01/29/2020    Medicare Yearly Exam  07/22/2020    Flu Vaccine (1) 09/01/2020   . Health Maintenance reviewed and discussed and ordered per Provider. Coordination of Care  1. Have you been to the ER, urgent care clinic since your last visit? Hospitalized since your last visit? no    2. Have you seen or consulted any other health care providers outside of the [address] [place] [address] since your last visit? Include any pap smears or colon screening.  no

## 2021-01-11 ENCOUNTER — TELEPHONE (OUTPATIENT)
Dept: FAMILY MEDICINE CLINIC | Age: 66
End: 2021-01-11

## 2021-01-11 DIAGNOSIS — U07.1 COVID-19: Primary | ICD-10-CM

## 2021-01-11 NOTE — TELEPHONE ENCOUNTER
Patient is requesting a COVID test. Says she currently coming out of quarantine and she need the PCR test. Please give her a call [phone]

## 2021-01-14 NOTE — TELEPHONE ENCOUNTER
Patient returned call to the office. She doesn't know what she is supposed to do. Patient said that you could leave a voicemail if you don't reach her.

## 2021-01-18 NOTE — TELEPHONE ENCOUNTER
Patient calling back to inquire about PCR testing. She has done a great deal of research and said she needs this type. I see an order for testing.

## 2021-01-22 ENCOUNTER — HOSPITAL ENCOUNTER (OUTPATIENT)
Dept: LAB | Age: 66
Discharge: HOME | End: 2021-01-22
Payer: MEDICARE

## 2021-01-22 DIAGNOSIS — U07.1 COVID-19: ICD-10-CM

## 2021-01-22 DIAGNOSIS — E55.9 VITAMIN D DEFICIENCY: ICD-10-CM

## 2021-01-22 DIAGNOSIS — E78.5 HYPERLIPIDEMIA LDL GOAL <100: ICD-10-CM

## 2021-01-22 DIAGNOSIS — I10 ESSENTIAL HYPERTENSION: ICD-10-CM

## 2021-01-22 LAB
25(OH)D3 SERPL-MCNC: 48.5 NG/ML (ref 30–100)
ALBUMIN SERPL-MCNC: 4.3 G/DL (ref 3.4–5)
ALBUMIN/GLOB SERPL: 1.1 {RATIO} (ref 0.8–1.7)
ALP SERPL-CCNC: 110 U/L (ref 45–117)
ALT SERPL-CCNC: 29 U/L (ref 13–56)
ANION GAP SERPL CALC-SCNC: 9 MMOL/L (ref 3–18)
AST SERPL-CCNC: 32 U/L (ref 10–38)
BILIRUB SERPL-MCNC: 0.7 MG/DL (ref 0.2–1)
BUN SERPL-MCNC: 15 MG/DL (ref 7–18)
BUN/CREAT SERPL: 13 (ref 12–20)
CALCIUM SERPL-MCNC: 9.4 MG/DL (ref 8.5–10.1)
CHLORIDE SERPL-SCNC: 101 MMOL/L (ref 100–111)
CHOLEST SERPL-MCNC: 251 MG/DL
CO2 SERPL-SCNC: 28 MMOL/L (ref 21–32)
CREAT SERPL-MCNC: 1.17 MG/DL (ref 0.6–1.3)
GLOBULIN SER CALC-MCNC: 4 G/DL (ref 2–4)
GLUCOSE SERPL-MCNC: 76 MG/DL (ref 74–99)
HDLC SERPL-MCNC: 110 MG/DL (ref 40–60)
HDLC SERPL: 2.3 {RATIO} (ref 0–5)
LDLC SERPL CALC-MCNC: 124.6 MG/DL (ref 0–100)
LIPID PROFILE,FLP: ABNORMAL
POTASSIUM SERPL-SCNC: 3.9 MMOL/L (ref 3.5–5.5)
PROT SERPL-MCNC: 8.3 G/DL (ref 6.4–8.2)
SODIUM SERPL-SCNC: 138 MMOL/L (ref 136–145)
TRIGL SERPL-MCNC: 82 MG/DL (ref ?–150)
VLDLC SERPL CALC-MCNC: 16.4 MG/DL

## 2021-01-22 PROCEDURE — 80061 LIPID PANEL: CPT

## 2021-01-22 PROCEDURE — 82306 VITAMIN D 25 HYDROXY: CPT

## 2021-01-22 PROCEDURE — U0005 INFEC AGEN DETEC AMPLI PROBE: HCPCS

## 2021-01-22 PROCEDURE — 36415 COLL VENOUS BLD VENIPUNCTURE: CPT

## 2021-01-22 PROCEDURE — 80053 COMPREHEN METABOLIC PANEL: CPT

## 2021-01-23 LAB — SARS-COV-2, COV2NT: NOT DETECTED

## 2021-01-26 ENCOUNTER — TELEPHONE (OUTPATIENT)
Dept: FAMILY MEDICINE CLINIC | Age: 66
End: 2021-01-26

## 2021-01-27 NOTE — TELEPHONE ENCOUNTER
Mrs. Gayla Gutierrez returned [place] call. Her main concern was Covid test.  I advised her it was negative and Dr. Wellington Landers will discuss her Lipid panel with her on 2/22 visit.

## 2021-02-17 ENCOUNTER — CLINICAL SUPPORT (OUTPATIENT)
Dept: FAMILY MEDICINE CLINIC | Age: 66
End: 2021-02-17

## 2021-02-17 VITALS
SYSTOLIC BLOOD PRESSURE: 150 MMHG | TEMPERATURE: 98.3 F | DIASTOLIC BLOOD PRESSURE: 81 MMHG | WEIGHT: 145.8 LBS | BODY MASS INDEX: 24.29 KG/M2 | HEIGHT: 65 IN | RESPIRATION RATE: 16 BRPM | HEART RATE: 76 BPM

## 2021-02-17 DIAGNOSIS — I10 ESSENTIAL HYPERTENSION: Primary | ICD-10-CM

## 2021-02-17 DIAGNOSIS — I10 HYPERTENSION, UNSPECIFIED TYPE: ICD-10-CM

## 2021-02-17 RX ORDER — LOSARTAN POTASSIUM 25 MG/1
25 TABLET ORAL DAILY
Qty: 90 TAB | Refills: 0 | Status: SHIPPED | OUTPATIENT
Start: 2021-02-17 | End: 2021-05-21 | Stop reason: SDUPTHER

## 2021-02-17 NOTE — PROGRESS NOTES
Blood pressure continues to be elevated, goal <130/80. Losartan 25 mg added to HCTZ daily. Follow-up in 5 days for BP recheck. Ideally, would need BP to be below 140/90 prior to surgery clearance. Of note, patient failed to follow-up for HTN after last preop exam, stating her blood pressure was \"better. \"

## 2021-02-19 NOTE — PROGRESS NOTES
Preoperative Evaluation      SUBJECTIVE:       Date of Exam: 2/22/2021     Kimberlee Zaman is a 77 y.o. female 1955 who presents for preoperative evaluation.    Procedure/Surgery:  Cataract Surgery (right then left)  Date of Procedure/Surgery: 02/24/2021 and 03/24/2021  Surgeon: Dr. Jessica Hardining: [facility]  Primary Physician: Singh Escalante MD   Latex Allergy: no    Problem List:     Patient Active Problem List    Diagnosis Date Noted    Gastroesophageal reflux disease without esophagitis 07/08/2019    Nausea 07/08/2019    Frequent headaches 07/08/2019    Other chest pain 06/04/2019    Generalized abdominal pain 06/04/2019    Other headache syndrome 06/04/2019    Non-intractable vomiting with nausea 06/04/2019    Malaise 06/04/2019    Supraventricular tachycardia ([address] 01/30/2017    Gastroesophageal reflux disease with esophagitis 01/30/2017    Anxiety 01/30/2017    Weakness generalized 01/30/2017    Esophageal dysfunction 11/17/2016    Post-resection malabsorption 11/17/2016    Atrial fibrillation with RVR ([address] 11/02/2016    Neck pain 09/14/2015    Musculoskeletal pain 09/05/2014    DDD (degenerative disc disease), cervical 09/05/2014    Status post cervical spinal fusion 09/05/2014    Cervicogenic headache 09/05/2014    Cervical dystonia 09/05/2014    Thyroid disease     Essential hypertension     Hypercholesterolemia     Encounter for postoperative care 02/18/2014    Chronic pain syndrome 01/31/2014    Postlaminectomy syndrome, cervical region 01/31/2014    Myalgia and myositis, unspecified 01/31/2014    Cervical disc disease with myelopathy 05/24/2013    S/P cervical discectomy 05/24/2013    Status post cervical spinal arthrodesis 05/24/2013    S/P gastric bypass - date of surgery 02/10 05/29/2012     Medical History:     Past Medical History:   Diagnosis Date    Anxiety disorder     Arthritis     Cervical dystonia 9/5/2014  Cervical pain     Cervicogenic headache 9/5/2014    Chronic pain     knee    DDD (degenerative disc disease), cervical 9/5/2014    Essential hypertension     Fibrillation, atrial (HCC)     Fibromyalgia     GERD (gastroesophageal reflux disease)     \"malfunctioning esophagus\"    Headache(784.0)     Hepatic steatosis     Herniated disc, cervical     Hypercholesterolemia     Hypertension     Joint pain     JRA (juvenile rheumatoid arthritis) (HCC)     Muscle pain     Musculoskeletal pain 9/5/2014    Non-intractable vomiting with nausea 6/4/2019    Numbness and tingling of left arm and leg     Status post cervical spinal fusion 9/5/2014    Thyroid disease     Vision decreased      Allergies: Allergies   Allergen Reactions    Codeine Nausea and Vomiting    Pcn [Penicillins] Not Reported This Time and Hives    Tramadol Palpitations    Vicodin [Hydrocodone-Acetaminophen] Itching and Hives      Medications:     Current Outpatient Medications   Medication Sig    losartan (COZAAR) 25 mg tablet Take 1 Tab by mouth daily.  cholecalciferol (VITAMIN D3) (50,000 UNITS /1250 MCG) capsule Take 1 Cap by mouth every seven (7) days. Indications: low vitamin D levels    traZODone (DESYREL) 50 mg tablet take 1 tablet by mouth at bedtime if needed    levothyroxine (SYNTHROID) 75 mcg tablet take 1 tablet by mouth once daily (BEFORE BREAKFAST)    dicyclomine (BENTYL) 20 mg tablet take 1 tablet by mouth four times a day    hydroCHLOROthiazide (HYDRODIURIL) 25 mg tablet Take 1 Tab by mouth daily.  oxyCODONE-acetaminophen (PERCOCET 10)  mg per tablet as needed.  tiZANidine (ZANAFLEX) 4 mg tablet as needed.  omeprazole (PRILOSEC) 40 mg capsule Take 1 Cap by mouth two (2) times a day.  (Patient taking differently: Take 40 mg by mouth as needed.)    ondansetron hcl (ZOFRAN) 8 mg tablet take 1 tablet by mouth every 12 hours if needed    CYANOCOBALAMIN, VITAMIN B-12, (VITAMIN B-12 PO) Take 1 tablet by mouth as needed.  ondansetron hcl (ZOFRAN) 4 mg tablet take 1 tablet by mouth every 8 hours    dicyclomine (BENTYL) 10 mg capsule take 1 capsule by mouth daily if needed for ABDOMINAL CRAMPS    promethazine (PHENERGAN) 12.5 mg tablet Take 1 Tab by mouth every eight (8) hours as needed for Nausea.  naloxone (NARCAN) 1 mg/mL injection 1 mL by IntraMUSCular route once as needed for up to 1 dose. No current facility-administered medications for this visit.       Surgical History:     Past Surgical History:   Procedure Laterality Date    COLONOSCOPY N/A 2017    COLONOSCOPY, DIAGNOSTIC performed [facility] Rafy Robertson [facility] ENDOSCOPY    HX CERVICAL LAMINECTOMY  13    C3-4 ACDF     HX COLONOSCOPY      HX GASTRIC BYPASS  02/03/10    HX LUMBAR LAMINECTOMY      HX ORTHOPAEDIC  2013    Spinal SurgeryC-2 AND C-3    HX OTHER SURGICAL      GIST tumor resected    HX OTHER SURGICAL      teeth extractions    HX PARTIAL HYSTERECTOMY      MI EXCISION TUMOR SOFT TISS FACE/SCALP SUBQ < 2CM N/A 2016    Dr. Gloria Martin     Social History:     Social History     Socioeconomic History    Marital status:      Spouse name: Not on file    Number of children: Not on file    Years of education: Not on file    Highest education level: Not on file   Occupational History    Occupation: disabled     Employer: disability   Tobacco Use    Smoking status: Former Smoker     Packs/day: 1.00     Years: 5.00     Pack years: 5.00     Types: Cigarettes     Quit date: 1989     Years since quittin.1    Smokeless tobacco: Never Used   Substance and Sexual Activity    Alcohol use: No    Drug use: Yes     Types: Prescription, OTC       Recent use of: No recent use of aspirin (ASA), NSAIDS or steroids    Tetanus up to date: Unknown    Anesthesia Complications: None  History of abnormal bleeding : None  History of Blood Transfusions: no  Health Care Directive or Living Will: no    Review of Systems   Constitutional: Negative for chills, fever and malaise/fatigue. Respiratory: Negative for cough and shortness of breath. Cardiovascular: Negative for chest pain, palpitations and leg swelling. Gastrointestinal: Negative for abdominal pain, diarrhea, nausea and vomiting. Genitourinary: Negative for dysuria, frequency and urgency. Neurological: Negative for dizziness, tingling, weakness and headaches. OBJECTIVE:   /75 (BP 1 Location: Left upper arm, BP Patient Position: Sitting)   Pulse 70   Temp 98.2 °F (36.8 °C) (Oral)   Resp 16   Ht 5' 5\" (1.651 m)   Wt 148 lb 9.6 oz (67.4 kg)   LMP 12/30/1988   SpO2 98%   BMI 24.73 kg/m²      Physical Exam       DIAGNOSTICS:   1. EKG: EKG FINDINGS - N/A  2. CXR: N/A  3. Labs: N/A      Assessment & Plan:     1. Preoperative examination   Patient's chronic conditions are stable and may proceed with planned procedure   Perioperative Cardiac Risk 0.15% per [facility] today via phone of patient's clearance   Patient given a copy of clearance form    2. Essential hypertension   BP now at goal of <130/80, continue regimen    3. Supraventricular tachycardia ([address]   Resolved    Follow-up and Dispositions    · Return in about 2 months with PCP (around 4/22/2021) for blood pressure, lab results.          Luis Reynaga NP   2/22/2021

## 2021-02-22 ENCOUNTER — TELEPHONE (OUTPATIENT)
Dept: FAMILY MEDICINE CLINIC | Age: 66
End: 2021-02-22

## 2021-02-22 ENCOUNTER — OFFICE VISIT (OUTPATIENT)
Dept: FAMILY MEDICINE CLINIC | Age: 66
End: 2021-02-22
Payer: MEDICARE

## 2021-02-22 VITALS
RESPIRATION RATE: 16 BRPM | WEIGHT: 148.6 LBS | HEIGHT: 65 IN | DIASTOLIC BLOOD PRESSURE: 75 MMHG | BODY MASS INDEX: 24.76 KG/M2 | HEART RATE: 70 BPM | OXYGEN SATURATION: 98 % | TEMPERATURE: 98.2 F | SYSTOLIC BLOOD PRESSURE: 126 MMHG

## 2021-02-22 DIAGNOSIS — I10 ESSENTIAL HYPERTENSION: ICD-10-CM

## 2021-02-22 DIAGNOSIS — Z01.818 PREOPERATIVE EXAMINATION: Primary | ICD-10-CM

## 2021-02-22 DIAGNOSIS — I47.1 SUPRAVENTRICULAR TACHYCARDIA (HCC): ICD-10-CM

## 2021-02-22 PROCEDURE — 99214 OFFICE O/P EST MOD 30 MIN: CPT | Performed by: NURSE PRACTITIONER

## 2021-02-22 NOTE — PROGRESS NOTES
Jillian Brandt presents today for   Chief Complaint   Patient presents with    Pre-op Exam     eye       Is someone accompanying this pt? no    Is the patient using any DME equipment during OV? no    Depression Screening:  3 most recent PHQ Screens 2/22/2021   Little interest or pleasure in doing things Not at all   Feeling down, depressed, irritable, or hopeless Not at all   Total Score PHQ 2 0       Learning Assessment:  Learning Assessment 2/22/2021   PRIMARY LEARNER Patient   HIGHEST LEVEL OF EDUCATION - PRIMARY LEARNER  SOME COLLEGE   BARRIERS PRIMARY LEARNER NONE   CO-LEARNER CAREGIVER No   PRIMARY LANGUAGE ENGLISH   LEARNER PREFERENCE PRIMARY DEMONSTRATION     LISTENING     -     -     -   ANSWERED BY patient    RELATIONSHIP SELF       Fall Risk  Fall Risk Assessment, last 12 mths 2/22/2021   Able to walk? Yes   Fall in past 12 months? 0   Do you feel unsteady? 0   Are you worried about falling 0       ADL  No flowsheet data found. Health Maintenance reviewed and discussed and ordered per Provider. Health Maintenance Due   Topic Date Due    COVID-19 Vaccine (1 of 2) 01/29/1971    DTaP/Tdap/Td series (1 - Tdap) 01/29/1976    Flu Vaccine (1) 09/01/2020    Pneumococcal 65+ years (2 of 2 - PPSV23) 02/04/2021   . Coordination of Care:  1. Have you been to the ER, urgent care clinic since your last visit? Hospitalized since your last visit? no    2. Have you seen or consulted any other health care providers outside of the [address] [place] [address] since your last visit? Include any pap smears or colon screening.  No

## 2021-02-26 ENCOUNTER — TELEPHONE (OUTPATIENT)
Dept: FAMILY MEDICINE CLINIC | Age: 66
End: 2021-02-26

## 2021-02-26 NOTE — TELEPHONE ENCOUNTER
Pt called this morning and stated she checked her bp on 02/25 and her readings was 78/42 and 84/56 and this was prior to taking her meds. The call was transferred to Mrs Crystal Sampson.

## 2021-02-26 NOTE — TELEPHONE ENCOUNTER
Spoke to patient, her BP recheck was reportedly 100/42. Patient states she feels fatigued with mild dizziness. She has not taken her blood pressure medication today. She states she has been drinking plenty of fluid. Of note, patient is s/p cataract surgery on 02/24/2021, hypotension likely secondary to moderate sedation from the procedure. Patient has been advised to hold all blood pressure medications and encouraged to call the answering service over the weekend if she continues to have blood pressure readings below 863 systolic (I am on call this weekend). Patient not able to drive until 12 days from surgery. I will check back on her on Monday.

## 2021-02-26 NOTE — TELEPHONE ENCOUNTER
Talked with patient, stated she has been getting low BP readings (82/48, 87/44, 86/68, 84/43 and 74/45). States she has been taking her BP med. She states after taking med it causes her to go to sleep. Instructed her to stop taking medication until I received further instructions from AMAYA Perry. Spoke with AMAYA Perry who stated to have patient come to the office and to bring her BP monitor. Called patient and she stated due to recent surgery she is not able to drive and there was no one there to bring her to office. Emphasized to patient again not to take any of the BP med and to continue to monitor. Advised her to seek tx from ED or urgent care if readings continue to be low and to f/u in office next week. Stated she understood all instructions given to her.

## 2021-03-01 NOTE — TELEPHONE ENCOUNTER
Called patient to re-evaluate regarding hypotension. Blood pressures now in the 597Z systolic, without her medications. Advised patient to continue to hold blood pressure medications for now. She has been instructed to follow-up sooner for consistent BP readings above 140/90; otherwise, keep scheduled appointment with PCP on 04/27/2021.

## 2021-03-04 DIAGNOSIS — I10 HYPERTENSION, ESSENTIAL: ICD-10-CM

## 2021-04-08 RX ORDER — HYDROCHLOROTHIAZIDE 25 MG/1
TABLET ORAL
Qty: 90 TAB | Refills: 0 | Status: SHIPPED | OUTPATIENT
Start: 2021-04-08 | End: 2021-04-22 | Stop reason: SDUPTHER

## 2021-04-22 DIAGNOSIS — I10 HYPERTENSION, ESSENTIAL: ICD-10-CM

## 2021-04-22 RX ORDER — HYDROCHLOROTHIAZIDE 25 MG/1
TABLET ORAL
Qty: 90 TAB | Refills: 1 | Status: SHIPPED | OUTPATIENT
Start: 2021-04-22 | End: 2022-01-18 | Stop reason: SDUPTHER

## 2021-04-22 NOTE — TELEPHONE ENCOUNTER
Patient need a medication refill.  Please advise     Requested Prescriptions     Pending Prescriptions Disp Refills    hydroCHLOROthiazide (HYDRODIURIL) 25 mg tablet 90 Tab 0

## 2021-04-27 ENCOUNTER — VIRTUAL VISIT (OUTPATIENT)
Dept: FAMILY MEDICINE CLINIC | Age: 66
End: 2021-04-27
Payer: MEDICARE

## 2021-04-27 DIAGNOSIS — I10 HYPERTENSION, ESSENTIAL: Primary | ICD-10-CM

## 2021-04-27 DIAGNOSIS — N63.10 BREAST MASS, RIGHT: ICD-10-CM

## 2021-04-27 DIAGNOSIS — E78.5 HYPERLIPIDEMIA LDL GOAL <100: ICD-10-CM

## 2021-04-27 DIAGNOSIS — R11.10 NON-INTRACTABLE VOMITING, PRESENCE OF NAUSEA NOT SPECIFIED, UNSPECIFIED VOMITING TYPE: ICD-10-CM

## 2021-04-27 DIAGNOSIS — R15.1 FECAL SMEARING: ICD-10-CM

## 2021-04-27 DIAGNOSIS — N64.4 BREAST PAIN, RIGHT: ICD-10-CM

## 2021-04-27 PROBLEM — H25.13 AGE-RELATED NUCLEAR CATARACT, BILATERAL: Status: ACTIVE | Noted: 2020-07-24

## 2021-04-27 PROCEDURE — 99443 PR PHYS/QHP TELEPHONE EVALUATION 21-30 MIN: CPT | Performed by: FAMILY MEDICINE

## 2021-04-27 RX ORDER — BRIMONIDINE TARTRATE 2 MG/ML
SOLUTION/ DROPS OPHTHALMIC
COMMUNITY
Start: 2021-04-25 | End: 2021-10-18 | Stop reason: ALTCHOICE

## 2021-04-27 NOTE — PROGRESS NOTES
Demetrius Berman is a 77 y.o. female, evaluated via audio-only technology on 4/27/2021 for Hypertension, Labs (1/22/21), and Breast Problem (c/o right breast pain around nipple area, states a knot cat be felt)  . Assessment & Plan:   Diagnoses and all orders for this visit:    1. Hypertension, essential  -     METABOLIC PANEL, COMPREHENSIVE; Future  -     LIPID PANEL; Future  Stable, cont pres tx plan. Pt reminded to take her med every day. 2. Breast pain, right  -     ROBERTA 3D DAVID W MAMMO RT DX INCL CAD; Future  -     REFERRAL TO SURGERY    3. Breast mass, right  -     ROBERTA 3D DAVID W MAMMO RT DX INCL CAD; Future  -     REFERRAL TO SURGERY    4. Fecal smearing  5. Non-intractable vomiting, presence of nausea not specified, unspecified vomiting type  Pt to call gi for f/u appt. Contact info given for Dr. Dakota Mustafa. 6. Hyperlipidemia LDL goal <100  Work on diet. The complexity of medical decision making for this visit is moderate   Follow-up and Dispositions    · Return in about 3 months (around 7/27/2021) for high blood pressure, f/u specialist visits (gi, breast surg). 12  Subjective:   Pt has been doing well. Pt reports that her R breast has been very sore and tender. There is an area near the nipple that is bothersome and she feels a nodule there. Pt reports that she has had some blood in her stool and has been having fecal smearing again. She is also having vomiting. She has seen Dr. Dakota Mustafa for this in the past and wants to f/u with her. Contact info given. recent labs reviewed in detail. Prior to Admission medications    Medication Sig Start Date End Date Taking?  Authorizing Provider   brimonidine (ALPHAGAN) 0.2 % ophthalmic solution PLACE ONE DROP INTO SURGICAL EYE THE EVENING AFTER SURGERY AND THEN TWICE DAILY FOR 7 DAYS 4/25/21  Yes Provider, Historical   hydroCHLOROthiazide (HYDRODIURIL) 25 mg tablet take 1 tablet by mouth once daily 4/22/21  Yes Yoni Perrin MD cholecalciferol (VITAMIN D3) (50,000 UNITS /1250 MCG) capsule Take 1 Cap by mouth every seven (7) days. Indications: low vitamin D levels 12/29/20  Yes Jagdeep Lomax NP   traZODone (DESYREL) 50 mg tablet take 1 tablet by mouth at bedtime if needed 12/29/20  Yes Jagdeep Lomax NP   levothyroxine (SYNTHROID) 75 mcg tablet take 1 tablet by mouth once daily (BEFORE BREAKFAST) 11/4/20  Yes Lu Jimenez MD   oxyCODONE-acetaminophen (PERCOCET 10)  mg per tablet as needed. 1/23/20  Yes Provider, Historical   tiZANidine (ZANAFLEX) 4 mg tablet as needed. 1/30/20  Yes Provider, Historical   omeprazole (PRILOSEC) 40 mg capsule Take 1 Cap by mouth two (2) times a day. Patient taking differently: Take 40 mg by mouth as needed. 12/20/19  Yes Víctor Mendosa MD   CYANOCOBALAMIN, VITAMIN B-12, (VITAMIN B-12 PO) Take 1 tablet by mouth as needed. Yes Provider, Historical   losartan (COZAAR) 25 mg tablet Take 1 Tab by mouth daily. 2/17/21   Jagdeep Lomax NP   ondansetron hcl (ZOFRAN) 4 mg tablet take 1 tablet by mouth every 8 hours 7/15/20   Provider, Historical   dicyclomine (BENTYL) 10 mg capsule take 1 capsule by mouth daily if needed for ABDOMINAL CRAMPS 4/6/20   Lu Jiemnez MD   ondansetron hcl (ZOFRAN) 8 mg tablet take 1 tablet by mouth every 12 hours if needed 11/15/19   Lu Jimenez MD   promethazine (PHENERGAN) 12.5 mg tablet Take 1 Tab by mouth every eight (8) hours as needed for Nausea. 8/19/19   Lu Jimenez MD   naloxone (NARCAN) 1 mg/mL injection 1 mL by IntraMUSCular route once as needed for up to 1 dose.  11/8/17   Lu Jimenez MD     Patient Active Problem List   Diagnosis Code    S/P gastric bypass - date of surgery 02/10 Z98.84    Cervical disc disease with myelopathy M50.00    S/P cervical discectomy Z98.890    Status post cervical spinal arthrodesis Z98.1    Chronic pain syndrome G89.4    Postlaminectomy syndrome, cervical region M96.1    Myalgia and myositis, unspecified [id number]    Encounter for postoperative care Z48.89    Thyroid disease E07.9    Essential hypertension I10    Hypercholesterolemia E78.00    Musculoskeletal pain M79.18    DDD (degenerative disc disease), cervical M50.30    Status post cervical spinal fusion Z98.1    Cervicogenic headache R51.9    Cervical dystonia G24.3    Neck pain M54.2    Esophageal dysfunction K22.4    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    Supraventricular tachycardia (HCC) I47.1    Gastroesophageal reflux disease with esophagitis K21.00    Anxiety F41.9    Weakness generalized R53.1    Other chest pain R07.89    Generalized abdominal pain R10.84    Other headache syndrome G44.89    Non-intractable vomiting with nausea R11.2    Malaise R53.81    Atrial fibrillation with RVR (HCC) I48.91    Gastroesophageal reflux disease without esophagitis K21.9    Nausea R11.0    Frequent headaches R51.9    Age-related nuclear cataract, bilateral H25.13     Current Outpatient Medications   Medication Sig Dispense Refill    brimonidine (ALPHAGAN) 0.2 % ophthalmic solution PLACE ONE DROP INTO SURGICAL EYE THE EVENING AFTER SURGERY AND THEN TWICE DAILY FOR 7 DAYS      hydroCHLOROthiazide (HYDRODIURIL) 25 mg tablet take 1 tablet by mouth once daily 90 Tab 1    cholecalciferol (VITAMIN D3) (50,000 UNITS /1250 MCG) capsule Take 1 Cap by mouth every seven (7) days. Indications: low vitamin D levels 12 Cap 0    traZODone (DESYREL) 50 mg tablet take 1 tablet by mouth at bedtime if needed 90 Tab 0    levothyroxine (SYNTHROID) 75 mcg tablet take 1 tablet by mouth once daily (BEFORE BREAKFAST) 90 Tab 1    oxyCODONE-acetaminophen (PERCOCET 10)  mg per tablet as needed.  tiZANidine (ZANAFLEX) 4 mg tablet as needed.  omeprazole (PRILOSEC) 40 mg capsule Take 1 Cap by mouth two (2) times a day.  (Patient taking differently: Take 40 mg by mouth as needed.) 60 Cap 5    CYANOCOBALAMIN, VITAMIN B-12, (VITAMIN B-12 PO) Take 1 tablet by mouth as needed.  losartan (COZAAR) 25 mg tablet Take 1 Tab by mouth daily. 90 Tab 0    ondansetron hcl (ZOFRAN) 4 mg tablet take 1 tablet by mouth every 8 hours      dicyclomine (BENTYL) 10 mg capsule take 1 capsule by mouth daily if needed for ABDOMINAL CRAMPS 30 Cap 0    ondansetron hcl (ZOFRAN) 8 mg tablet take 1 tablet by mouth every 12 hours if needed 30 Tab 0    promethazine (PHENERGAN) 12.5 mg tablet Take 1 Tab by mouth every eight (8) hours as needed for Nausea. 30 Tab 0    naloxone (NARCAN) 1 mg/mL injection 1 mL by IntraMUSCular route once as needed for up to 1 dose.  1 Syringe 0     Allergies   Allergen Reactions    Codeine Nausea and Vomiting    Pcn [Penicillins] Not Reported This Time and Hives    Tramadol Palpitations and Itching    Vicodin [Hydrocodone-Acetaminophen] Itching and Hives     Past Medical History:   Diagnosis Date    Anxiety disorder     Arthritis     Cervical dystonia 9/5/2014    Cervical pain     Cervicogenic headache 9/5/2014    Chronic pain     knee    COVID-19 12/2020    DDD (degenerative disc disease), cervical 9/5/2014    Essential hypertension     Fibrillation, atrial (HCC)     Fibromyalgia     GERD (gastroesophageal reflux disease)     \"malfunctioning esophagus\"    Headache(784.0)     Hepatic steatosis     Herniated disc, cervical     Hypercholesterolemia     Hypertension     Joint pain     JRA (juvenile rheumatoid arthritis) (HCC)     Muscle pain     Musculoskeletal pain 9/5/2014    Non-intractable vomiting with nausea 6/4/2019    Numbness and tingling of left arm and leg     Status post cervical spinal fusion 9/5/2014    Thyroid disease     Vision decreased      Past Surgical History:   Procedure Laterality Date    COLONOSCOPY N/A 4/18/2017    COLONOSCOPY, DIAGNOSTIC performed by Art Billingsley [facility] at [facility] [address]  5/21/13    C3-4 ACDF     HX COLONOSCOPY      HX GASTRIC BYPASS  02/03/10  HX LUMBAR LAMINECTOMY      HX ORTHOPAEDIC  2013    Spinal SurgeryC-2 AND C-3    HX OTHER SURGICAL      GIST tumor resected    HX OTHER SURGICAL      teeth extractions    HX PARTIAL HYSTERECTOMY      TN EXCISION TUMOR SOFT TISS FACE/SCALP SUBQ < 2CM N/A 2016    Dr. Tomas Monge     Family History   Problem Relation Age of Onset    Hypertension Mother     Heart Failure Mother     Hypertension Father     Arthritis-osteo Father      Social History     Tobacco Use    Smoking status: Former Smoker     Packs/day: 1.00     Years: 5.00     Pack years: 5.00     Types: Cigarettes     Quit date: 1989     Years since quittin.3    Smokeless tobacco: Never Used   Substance Use Topics    Alcohol use: No       Review of Systems   Constitutional: Negative. HENT: Negative. Respiratory: Negative. Cardiovascular: Negative. Gastrointestinal: Positive for blood in stool and vomiting. Fecal smearing   Endo/Heme/Allergies:        R breast pain and mass   All other systems reviewed and are negative. No flowsheet data found. Tevin Acosta, who was evaluated through a patient-initiated, synchronous (real-time) audio only encounter, and/or her healthcare decision maker, is aware that it is a billable service, with coverage as determined by her insurance carrier. She provided verbal consent to proceed: n/a- consent obtained within past 12 months. She has not had a related appointment within my department in the past 7 days or scheduled within the next 24 hours.       Total Time: minutes: 21-30 minutes    Susana Thacker MD

## 2021-04-27 NOTE — PROGRESS NOTES
Kevan Molina presents today for No chief complaint on file. Kevan Molina preferred language for health care discussion is english/other. Is someone accompanying this pt? No    Is the patient using any DME equipment during OV? No      Depression Screening:  3 most recent PHQ Screens 2/22/2021   Little interest or pleasure in doing things Not at all   Feeling down, depressed, irritable, or hopeless Not at all   Total Score PHQ 2 0       Learning Assessment:  Learning Assessment 2/22/2021   PRIMARY LEARNER Patient   HIGHEST LEVEL OF EDUCATION - PRIMARY LEARNER  SOME COLLEGE   BARRIERS PRIMARY LEARNER NONE   CO-LEARNER CAREGIVER No   PRIMARY LANGUAGE ENGLISH   LEARNER PREFERENCE PRIMARY DEMONSTRATION     LISTENING     -     -     -   ANSWERED BY patient    RELATIONSHIP SELF       Fall Risk  Fall Risk Assessment, last 12 mths 2/22/2021   Able to walk? Yes   Fall in past 12 months? 0   Do you feel unsteady? 0   Are you worried about falling 0       Travel Screening:   Travel Screening      No screening recorded since 04/26/21 0000      Travel History   Travel since 03/27/21     No documented travel since 03/27/21          Health Maintenance reviewed and discussed and ordered per Provider. Health Maintenance Due   Topic Date Due    COVID-19 Vaccine (1) Never done    DTaP/Tdap/Td series (1 - Tdap) Never done    Pneumococcal 65+ years (2 of 2 - PPSV23) 02/04/2021   . Coordination of Care:  1. Have you been to the ER, urgent care clinic since your last visit? Hospitalized since your last visit? No    2. Have you seen or consulted any other health care providers outside of the [address] [place] [address] since your last visit? Include any pap smears or colon screening.  No

## 2021-05-03 NOTE — PROGRESS NOTES
Nursing Notes    Patient presents to the office today in follow-up. Reviewed medications with counts as follows:    Rx Date filled Qty Dispensed Pill Count Last Dose Short   Percocet 10/325 1/20/17 120 8 Today. 2 doses Yes. 2 tabs   Ms. Payton Dandy has a reminder for a \"due or due soon\" health maintenance. I have asked that she contact her primary care provider for follow-up on this health maintenance. POC UDS was performed in office today    Any new labs or imaging since last appointment? YES    Have you been to an emergency room (ER) or urgent care clinic since your last visit? NO            Have you been hospitalized since your last visit? NO     If yes, where, when, and reason for visit? Have you seen or consulted any other health care providers outside of the [address] [place] [address]  since your last visit? YES     If yes, where, when, and reason for visit?    Cardiologist   Gastroenterologist warm

## 2021-05-04 ENCOUNTER — TELEPHONE (OUTPATIENT)
Dept: FAMILY MEDICINE CLINIC | Age: 66
End: 2021-05-04

## 2021-05-04 DIAGNOSIS — N64.4 BREAST PAIN, RIGHT: Primary | ICD-10-CM

## 2021-05-15 DIAGNOSIS — I10 ESSENTIAL HYPERTENSION: ICD-10-CM

## 2021-05-16 RX ORDER — LOSARTAN POTASSIUM 25 MG/1
TABLET ORAL
Qty: 90 TAB | Refills: 0 | OUTPATIENT
Start: 2021-05-16

## 2021-05-20 ENCOUNTER — HOSPITAL ENCOUNTER (OUTPATIENT)
Dept: ULTRASOUND IMAGING | Age: 66
Discharge: HOME | End: 2021-05-20
Attending: FAMILY MEDICINE
Payer: MEDICARE

## 2021-05-20 ENCOUNTER — HOSPITAL ENCOUNTER (OUTPATIENT)
Dept: WOMENS IMAGING | Age: 66
Discharge: HOME | End: 2021-05-20
Attending: FAMILY MEDICINE
Payer: MEDICARE

## 2021-05-20 DIAGNOSIS — N64.4 BREAST PAIN, RIGHT: ICD-10-CM

## 2021-05-20 PROCEDURE — 77062 BREAST TOMOSYNTHESIS BI: CPT

## 2021-05-20 PROCEDURE — 76642 ULTRASOUND BREAST LIMITED: CPT

## 2021-05-20 NOTE — PROGRESS NOTES
Mammogram result reviewed. surg referral entered previously. Will have staff call pt to remind her to sched the appt.

## 2021-05-21 ENCOUNTER — TELEPHONE (OUTPATIENT)
Dept: FAMILY MEDICINE CLINIC | Age: 66
End: 2021-05-21

## 2021-05-21 RX ORDER — LOSARTAN POTASSIUM 25 MG/1
25 TABLET ORAL DAILY
Qty: 90 TABLET | Refills: 1 | Status: SHIPPED | OUTPATIENT
Start: 2021-05-21

## 2021-05-21 NOTE — TELEPHONE ENCOUNTER
Amrit called and said that they placed an order for a diagnostic Mammo that need to be signed in [facility].  Please advise

## 2021-06-02 ENCOUNTER — TELEPHONE (OUTPATIENT)
Dept: FAMILY MEDICINE CLINIC | Age: 66
End: 2021-06-02

## 2021-06-02 NOTE — TELEPHONE ENCOUNTER
Patient called and said that she had her mammogram and they are recommending that she see a breast surgeon.  Please advise

## 2021-10-18 ENCOUNTER — OFFICE VISIT (OUTPATIENT)
Dept: FAMILY MEDICINE CLINIC | Age: 66
End: 2021-10-18
Payer: MEDICARE

## 2021-10-18 VITALS
SYSTOLIC BLOOD PRESSURE: 132 MMHG | OXYGEN SATURATION: 98 % | HEART RATE: 75 BPM | RESPIRATION RATE: 18 BRPM | TEMPERATURE: 98.8 F | HEIGHT: 65 IN | BODY MASS INDEX: 24.99 KG/M2 | WEIGHT: 150 LBS | DIASTOLIC BLOOD PRESSURE: 82 MMHG

## 2021-10-18 DIAGNOSIS — I10 ESSENTIAL HYPERTENSION: Primary | ICD-10-CM

## 2021-10-18 DIAGNOSIS — N63.10 BREAST MASS, RIGHT: ICD-10-CM

## 2021-10-18 DIAGNOSIS — N64.52 DISCHARGE FROM RIGHT NIPPLE: ICD-10-CM

## 2021-10-18 DIAGNOSIS — M25.569 CHRONIC KNEE PAIN, UNSPECIFIED LATERALITY: ICD-10-CM

## 2021-10-18 DIAGNOSIS — E55.9 VITAMIN D DEFICIENCY: ICD-10-CM

## 2021-10-18 DIAGNOSIS — G89.29 CHRONIC KNEE PAIN, UNSPECIFIED LATERALITY: ICD-10-CM

## 2021-10-18 DIAGNOSIS — G47.00 INSOMNIA, UNSPECIFIED TYPE: ICD-10-CM

## 2021-10-18 PROCEDURE — G8399 PT W/DXA RESULTS DOCUMENT: HCPCS | Performed by: FAMILY MEDICINE

## 2021-10-18 PROCEDURE — 99214 OFFICE O/P EST MOD 30 MIN: CPT | Performed by: FAMILY MEDICINE

## 2021-10-18 PROCEDURE — G9899 SCRN MAM PERF RSLTS DOC: HCPCS | Performed by: FAMILY MEDICINE

## 2021-10-18 PROCEDURE — 1101F PT FALLS ASSESS-DOCD LE1/YR: CPT | Performed by: FAMILY MEDICINE

## 2021-10-18 PROCEDURE — G8536 NO DOC ELDER MAL SCRN: HCPCS | Performed by: FAMILY MEDICINE

## 2021-10-18 PROCEDURE — G8752 SYS BP LESS 140: HCPCS | Performed by: FAMILY MEDICINE

## 2021-10-18 PROCEDURE — 3017F COLORECTAL CA SCREEN DOC REV: CPT | Performed by: FAMILY MEDICINE

## 2021-10-18 PROCEDURE — G8754 DIAS BP LESS 90: HCPCS | Performed by: FAMILY MEDICINE

## 2021-10-18 PROCEDURE — G8427 DOCREV CUR MEDS BY ELIG CLIN: HCPCS | Performed by: FAMILY MEDICINE

## 2021-10-18 PROCEDURE — 1090F PRES/ABSN URINE INCON ASSESS: CPT | Performed by: FAMILY MEDICINE

## 2021-10-18 PROCEDURE — G8432 DEP SCR NOT DOC, RNG: HCPCS | Performed by: FAMILY MEDICINE

## 2021-10-18 PROCEDURE — G8420 CALC BMI NORM PARAMETERS: HCPCS | Performed by: FAMILY MEDICINE

## 2021-10-18 RX ORDER — TRAZODONE HYDROCHLORIDE 50 MG/1
TABLET ORAL
Qty: 90 TABLET | Refills: 0 | Status: SHIPPED | OUTPATIENT
Start: 2021-10-18 | End: 2022-01-10

## 2021-10-18 NOTE — PROGRESS NOTES
Chief Complaint   Patient presents with    Hypertension    Follow-up     Specialist follow up ( GI, Breast surgery)    Medication Refill         HPI    Ismael Rodriguez is a 77 y.o. female presenting today for follow up of htn. Pt did not f/u on the R breast pain. It does have some drainage from the nipple. She still feels the nodule. She did have imaging done but did not f/u with the surgeon as instructed. No recent labs. Patient does need medication refills today. New concerns today: pt c/o ongoing knee pain. She feels she needs a f/u with her ortho. She does not remember the name of the doctor she saw. Review of Systems   Constitutional: Negative. HENT: Negative. Respiratory: Negative. Cardiovascular: Negative. Genitourinary:        R breast drainage   All other systems reviewed and are negative. Physical Exam  Vitals and nursing note reviewed. Constitutional:       Appearance: Normal appearance. She is not ill-appearing. HENT:      Head: Normocephalic and atraumatic. Right Ear: External ear normal.      Left Ear: External ear normal.      Nose: Nose normal.      Mouth/Throat:      Mouth: Mucous membranes are moist.   Eyes:      Extraocular Movements: Extraocular movements intact. Conjunctiva/sclera: Conjunctivae normal.   Cardiovascular:      Rate and Rhythm: Normal rate and regular rhythm. Heart sounds: No murmur heard. No friction rub. No gallop. Pulmonary:      Effort: Pulmonary effort is normal.      Breath sounds: Normal breath sounds. No wheezing, rhonchi or rales. Musculoskeletal:         General: Normal range of motion. Cervical back: Normal range of motion. Skin:     General: Skin is warm and dry. Neurological:      Mental Status: She is alert and oriented to person, place, and time.       Coordination: Coordination normal.   Psychiatric:         Mood and Affect: Mood normal.         Behavior: Behavior normal. Thought Content: Thought content normal.         Judgment: Judgment normal.         Diagnoses and all orders for this visit:    1. Essential hypertension  -     METABOLIC PANEL, COMPREHENSIVE; Future  -     LIPID PANEL; Future  Stable, cont pres tx plan. 2. Insomnia, unspecified type  -     traZODone (DESYREL) 50 mg tablet; take 1 tablet by mouth at bedtime if needed    3. Breast mass, right  4. Discharge from right nipple  -     REFERRAL TO SURGERY    5. Vitamin D deficiency  -     VITAMIN D, 25 HYDROXY; Future    6. Chronic knee pain, unspecified laterality  Contact info given for ortho. Pt to sched. Follow-up and Dispositions    · Return in about 3 months (around 1/18/2022) for high blood pressure, vit D deficiency, lab review, insomnia.

## 2021-10-26 NOTE — H&P (VIEW-ONLY)
Nipple Discharge Consult    Ms. Tyler Wellington is a 77year old woman who was referred for right nipple discharge. She initially noticed the discharge around May 2021. She reports the discharge is clear and non bloody. It is spontaneous. She reports associated breast pain. She denied history of similar symptoms previously. She is not currently nursing. Imaging has been performed and was without evidence of malignancy. There is no family history of breast cancer      Breast/GYN history:    OB History    No obstetric history on file.           Past Medical History:   Diagnosis Date    Anxiety disorder     Arthritis     Cervical dystonia 9/5/2014    Cervical pain     Cervicogenic headache 9/5/2014    Chronic pain     knee    COVID-19 12/2020    DDD (degenerative disc disease), cervical 9/5/2014    Essential hypertension     Fibrillation, atrial (HCC)     Fibromyalgia     GERD (gastroesophageal reflux disease)     \"malfunctioning esophagus\"    Headache(784.0)     Hepatic steatosis     Herniated disc, cervical     Hypercholesterolemia     Hypertension     Joint pain     JRA (juvenile rheumatoid arthritis) ([address]     Menopause     Muscle pain     Musculoskeletal pain 9/5/2014    Non-intractable vomiting with nausea 6/4/2019    Numbness and tingling of left arm and leg     Status post cervical spinal fusion 9/5/2014    Thyroid disease     Vision decreased        Past Surgical History:   Procedure Laterality Date    COLONOSCOPY N/A 4/18/2017    COLONOSCOPY, DIAGNOSTIC performed by Jacqueline Reynolds MD at [address] [place] [address] HX CERVICAL LAMINECTOMY  5/21/13    C3-4 ACDF     HX COLONOSCOPY      HX GASTRIC BYPASS  02/03/10    HX LUMBAR LAMINECTOMY      HX ORTHOPAEDIC  2013    Spinal SurgeryC-2 AND C-3    HX OTHER SURGICAL      GIST tumor resected    HX OTHER SURGICAL      teeth extractions    HX PARTIAL HYSTERECTOMY  1988    RI EXCISION TUMOR SOFT TISS FACE/SCALP SUBQ < 2CM N/A 09/30/2016 Dr. Victoriano Kevin       Current Outpatient Medications on File Prior to Visit   Medication Sig Dispense Refill    traZODone (DESYREL) 50 mg tablet take 1 tablet by mouth at bedtime if needed 90 Tablet 0    losartan (COZAAR) 25 mg tablet Take 1 Tablet by mouth daily. 90 Tablet 1    hydroCHLOROthiazide (HYDRODIURIL) 25 mg tablet take 1 tablet by mouth once daily 90 Tab 1    cholecalciferol (VITAMIN D3) (50,000 UNITS /1250 MCG) capsule Take 1 Cap by mouth every seven (7) days. Indications: low vitamin D levels 12 Cap 0    levothyroxine (SYNTHROID) 75 mcg tablet take 1 tablet by mouth once daily (BEFORE BREAKFAST) 90 Tab 1    oxyCODONE-acetaminophen (PERCOCET 10)  mg per tablet as needed.  tiZANidine (ZANAFLEX) 4 mg tablet as needed.  omeprazole (PRILOSEC) 40 mg capsule Take 1 Cap by mouth two (2) times a day. (Patient taking differently: Take 40 mg by mouth as needed.) 60 Cap 5    promethazine (PHENERGAN) 12.5 mg tablet Take 1 Tab by mouth every eight (8) hours as needed for Nausea. 30 Tab 0    CYANOCOBALAMIN, VITAMIN B-12, (VITAMIN B-12 PO) Take 1 tablet by mouth as needed.  ondansetron hcl (ZOFRAN) 4 mg tablet take 1 tablet by mouth every 8 hours (Patient not taking: Reported on 10/27/2021)      dicyclomine (BENTYL) 10 mg capsule take 1 capsule by mouth daily if needed for ABDOMINAL CRAMPS (Patient not taking: Reported on 10/18/2021) 30 Cap 0    ondansetron hcl (ZOFRAN) 8 mg tablet take 1 tablet by mouth every 12 hours if needed (Patient not taking: Reported on 10/18/2021) 30 Tab 0    naloxone (NARCAN) 1 mg/mL injection 1 mL by IntraMUSCular route once as needed for up to 1 dose. (Patient not taking: Reported on 10/18/2021) 1 Syringe 0     No current facility-administered medications on file prior to visit.        Allergies   Allergen Reactions    Codeine Nausea and Vomiting    Pcn [Penicillins] Not Reported This Time and Hives    Tramadol Palpitations and Itching    Vicodin [Hydrocodone-Acetaminophen] Itching and Hives       Social History     Tobacco Use    Smoking status: Former Smoker     Packs/day: 1.00     Years: 5.00     Pack years: 5.00     Types: Cigarettes     Quit date: 1989     Years since quittin.8    Smokeless tobacco: Never Used   Vaping Use    Vaping Use: Never used   Substance Use Topics    Alcohol use: No    Drug use: Yes     Types: Prescription, OTC       Family History   Problem Relation Age of Onset    Hypertension Mother     Heart Failure Mother     Hypertension Father     Arthritis-osteo Father          ROS:   General: denies fevers, chills, night sweats, fatigue, weight loss, weight gain  GI: denies nausea, vomiting, abdominal pain, change in bowel habits, hematochezia, melena, GERD  Integ: denies dermatitis, abnormal moles  HEENT: denies visual changes, vertigo, epistaxis, dysphagia, hoarseness  Cardiac: denies chest pain, palpitations, HTN, edema, varicosities  Resp: denies cough, shortness of breath, wheezing, hemoptysis, snoring, reactive airway disease  : denies hematuria, dysuria, frequency, urgency, nocturia, stress urinary incontinence MSK: weakness, joint pain, arthritis  Endocrine: denies diabetes, thyroid disease, polyuria, polydipsia, polyphagia, poor wound healing, heat intolerance, cold intolerance  Lymph/Heme: denies anemia, bruising, history of blood transfusions  Neuro: denies dizziness, headache, fainting, seizures, stroke  Psych: denies anxiety, depression    Physical Exam:  Visit Vitals  /80   Temp 97.9 °F (36.6 °C) (Skin)   Resp 16   Ht 5' 5\" (1.651 m)   Wt 69.4 kg (153 lb)   LMP 1988   BMI 25.46 kg/m²       Gen: No distress  Head: Normocephalic, atraumatic  Mouth: Clear, no overt lesions, oral mucosa pink and moist  Neck: Supple, no masses, no adenopathy, trachea midline  Resp: Clear bilaterally  Cardio: Regular rate and rhythm  Abdomen: soft, nontender, nondistended  Extremeties: warm, well-perfused  Neuro: sensation and strength grossly intact and symmetrical  Psych: alert and oriented to person, place and time  Breasts:  Right: Examined in both the supine and upright positions. There was no supraclavicular, infraclavicular, or axillary lympadenopathy. There were no dominant masses, no skin changes, no asymmetry identified tender, no drainage today  Left:  Examined in both the supine and upright positions. There was no supraclavicular, infraclavicular, or axillary lympadenopathy. There were no dominant masses, no skin changes, no asymmetry identified     Imagin21 bilateral mammogram/right ultrasound  No mammographic or sonographic evidence of malignancy. -Given reported history of spontaneous clear unilateral nipple discharge from  the right breast, recommend surgical consultation for further clinical  evaluation/management.        -Findings and recommendations were discussed with the patient. The patient met  with breast care coordinator prior to leaving the department.        BIRADS 1:  Negative  (PLEASE SEE DISCUSSION ABOVE WITH RECOMMENDATION FOR SURGICAL CONSULT).       Impression:  Patient Active Problem List   Diagnosis Code    S/P gastric bypass - date of surgery 02/10 Z98.84    Cervical disc disease with myelopathy M50.00    S/P cervical discectomy Z98.890    Status post cervical spinal arthrodesis Z98.1    Chronic pain syndrome G89.4    Postlaminectomy syndrome, cervical region M96.1    Myalgia and myositis, unspecified [id number]    Encounter for postoperative care Z48.89    Thyroid disease E07.9    Essential hypertension I10    Hypercholesterolemia E78.00    Musculoskeletal pain M79.18    DDD (degenerative disc disease), cervical M50.30    Status post cervical spinal fusion Z98.1    Cervicogenic headache G44.86    Cervical dystonia G24.3    Neck pain M54.2    Esophageal dysfunction K22.4    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    Supraventricular tachycardia (HCC) I47.1    Gastroesophageal reflux disease with esophagitis K21.00    Anxiety F41.9    Weakness generalized R53.1    Other chest pain R07.89    Generalized abdominal pain R10.84    Other headache syndrome G44.89    Non-intractable vomiting with nausea R11.2    Malaise R53.81    Atrial fibrillation with RVR (HCC) I48.91    Gastroesophageal reflux disease without esophagitis K21.9    Nausea R11.0    Frequent headaches R51.9    Age-related nuclear cataract, bilateral H25.13    Nipple discharge N64.52        77year old woman with right nipple discharge. Based on description and exam, I think it is likely Ms. Eliza Almaguer has fibrocystic changes of the breast. We discussed this process in detail. We discussed that it is not malignant. It may cause \"lumpiness\" of the breasts and may cause pain and/or nipple discharge. As it is spontaneous and bothering Ms. Guaman Points, she is interested in retroareolar excisional biopsy. Risks, benefits and options were discussed in detail to include, but not limited to, bleeding, infection, risks of anesthesia, injury to surrounding structures and other unforeseen events such as stroke, heart attack or death. She was asked to call with questions or concerns.

## 2021-10-26 NOTE — PROGRESS NOTES
Nipple Discharge Consult    Ms. Barbara Maher is a 77year old woman who was referred for right nipple discharge. She initially noticed the discharge around May 2021. She reports the discharge is clear and non bloody. It is spontaneous. She reports associated breast pain. She denied history of similar symptoms previously. She is not currently nursing. Imaging has been performed and was without evidence of malignancy. There is no family history of breast cancer      Breast/GYN history:    OB History    No obstetric history on file.           Past Medical History:   Diagnosis Date    Anxiety disorder     Arthritis     Cervical dystonia 9/5/2014    Cervical pain     Cervicogenic headache 9/5/2014    Chronic pain     knee    COVID-19 12/2020    DDD (degenerative disc disease), cervical 9/5/2014    Essential hypertension     Fibrillation, atrial (HCC)     Fibromyalgia     GERD (gastroesophageal reflux disease)     \"malfunctioning esophagus\"    Headache(784.0)     Hepatic steatosis     Herniated disc, cervical     Hypercholesterolemia     Hypertension     Joint pain     JRA (juvenile rheumatoid arthritis) ([address]     Menopause     Muscle pain     Musculoskeletal pain 9/5/2014    Non-intractable vomiting with nausea 6/4/2019    Numbness and tingling of left arm and leg     Status post cervical spinal fusion 9/5/2014    Thyroid disease     Vision decreased        Past Surgical History:   Procedure Laterality Date    COLONOSCOPY N/A 4/18/2017    COLONOSCOPY, DIAGNOSTIC performed by Lina Barrow MD at [address] [place] [address] HX CERVICAL LAMINECTOMY  5/21/13    C3-4 ACDF     HX COLONOSCOPY      HX GASTRIC BYPASS  02/03/10    HX LUMBAR LAMINECTOMY      HX ORTHOPAEDIC  2013    Spinal SurgeryC-2 AND C-3    HX OTHER SURGICAL      GIST tumor resected    HX OTHER SURGICAL      teeth extractions    HX PARTIAL HYSTERECTOMY  1988    AR EXCISION TUMOR SOFT TISS FACE/SCALP SUBQ < 2CM N/A 09/30/2016 Dr. Akhil Hogan       Current Outpatient Medications on File Prior to Visit   Medication Sig Dispense Refill    traZODone (DESYREL) 50 mg tablet take 1 tablet by mouth at bedtime if needed 90 Tablet 0    losartan (COZAAR) 25 mg tablet Take 1 Tablet by mouth daily. 90 Tablet 1    hydroCHLOROthiazide (HYDRODIURIL) 25 mg tablet take 1 tablet by mouth once daily 90 Tab 1    cholecalciferol (VITAMIN D3) (50,000 UNITS /1250 MCG) capsule Take 1 Cap by mouth every seven (7) days. Indications: low vitamin D levels 12 Cap 0    levothyroxine (SYNTHROID) 75 mcg tablet take 1 tablet by mouth once daily (BEFORE BREAKFAST) 90 Tab 1    oxyCODONE-acetaminophen (PERCOCET 10)  mg per tablet as needed.  tiZANidine (ZANAFLEX) 4 mg tablet as needed.  omeprazole (PRILOSEC) 40 mg capsule Take 1 Cap by mouth two (2) times a day. (Patient taking differently: Take 40 mg by mouth as needed.) 60 Cap 5    promethazine (PHENERGAN) 12.5 mg tablet Take 1 Tab by mouth every eight (8) hours as needed for Nausea. 30 Tab 0    CYANOCOBALAMIN, VITAMIN B-12, (VITAMIN B-12 PO) Take 1 tablet by mouth as needed.  ondansetron hcl (ZOFRAN) 4 mg tablet take 1 tablet by mouth every 8 hours (Patient not taking: Reported on 10/27/2021)      dicyclomine (BENTYL) 10 mg capsule take 1 capsule by mouth daily if needed for ABDOMINAL CRAMPS (Patient not taking: Reported on 10/18/2021) 30 Cap 0    ondansetron hcl (ZOFRAN) 8 mg tablet take 1 tablet by mouth every 12 hours if needed (Patient not taking: Reported on 10/18/2021) 30 Tab 0    naloxone (NARCAN) 1 mg/mL injection 1 mL by IntraMUSCular route once as needed for up to 1 dose. (Patient not taking: Reported on 10/18/2021) 1 Syringe 0     No current facility-administered medications on file prior to visit.        Allergies   Allergen Reactions    Codeine Nausea and Vomiting    Pcn [Penicillins] Not Reported This Time and Hives    Tramadol Palpitations and Itching    Vicodin [Hydrocodone-Acetaminophen] Itching and Hives       Social History     Tobacco Use    Smoking status: Former Smoker     Packs/day: 1.00     Years: 5.00     Pack years: 5.00     Types: Cigarettes     Quit date: 1989     Years since quittin.8    Smokeless tobacco: Never Used   Vaping Use    Vaping Use: Never used   Substance Use Topics    Alcohol use: No    Drug use: Yes     Types: Prescription, OTC       Family History   Problem Relation Age of Onset    Hypertension Mother     Heart Failure Mother     Hypertension Father     Arthritis-osteo Father          ROS:   General: denies fevers, chills, night sweats, fatigue, weight loss, weight gain  GI: denies nausea, vomiting, abdominal pain, change in bowel habits, hematochezia, melena, GERD  Integ: denies dermatitis, abnormal moles  HEENT: denies visual changes, vertigo, epistaxis, dysphagia, hoarseness  Cardiac: denies chest pain, palpitations, HTN, edema, varicosities  Resp: denies cough, shortness of breath, wheezing, hemoptysis, snoring, reactive airway disease  : denies hematuria, dysuria, frequency, urgency, nocturia, stress urinary incontinence MSK: weakness, joint pain, arthritis  Endocrine: denies diabetes, thyroid disease, polyuria, polydipsia, polyphagia, poor wound healing, heat intolerance, cold intolerance  Lymph/Heme: denies anemia, bruising, history of blood transfusions  Neuro: denies dizziness, headache, fainting, seizures, stroke  Psych: denies anxiety, depression    Physical Exam:  Visit Vitals  /80   Temp 97.9 °F (36.6 °C) (Skin)   Resp 16   Ht 5' 5\" (1.651 m)   Wt 69.4 kg (153 lb)   LMP 1988   BMI 25.46 kg/m²       Gen: No distress  Head: Normocephalic, atraumatic  Mouth: Clear, no overt lesions, oral mucosa pink and moist  Neck: Supple, no masses, no adenopathy, trachea midline  Resp: Clear bilaterally  Cardio: Regular rate and rhythm  Abdomen: soft, nontender, nondistended  Extremeties: warm, well-perfused  Neuro: sensation and strength grossly intact and symmetrical  Psych: alert and oriented to person, place and time  Breasts:  Right: Examined in both the supine and upright positions. There was no supraclavicular, infraclavicular, or axillary lympadenopathy. There were no dominant masses, no skin changes, no asymmetry identified tender, no drainage today  Left:  Examined in both the supine and upright positions. There was no supraclavicular, infraclavicular, or axillary lympadenopathy. There were no dominant masses, no skin changes, no asymmetry identified     Imagin21 bilateral mammogram/right ultrasound  No mammographic or sonographic evidence of malignancy. -Given reported history of spontaneous clear unilateral nipple discharge from  the right breast, recommend surgical consultation for further clinical  evaluation/management.        -Findings and recommendations were discussed with the patient. The patient met  with breast care coordinator prior to leaving the department.        BIRADS 1:  Negative  (PLEASE SEE DISCUSSION ABOVE WITH RECOMMENDATION FOR SURGICAL CONSULT).       Impression:  Patient Active Problem List   Diagnosis Code    S/P gastric bypass - date of surgery 02/10 Z98.84    Cervical disc disease with myelopathy M50.00    S/P cervical discectomy Z98.890    Status post cervical spinal arthrodesis Z98.1    Chronic pain syndrome G89.4    Postlaminectomy syndrome, cervical region M96.1    Myalgia and myositis, unspecified [id number]    Encounter for postoperative care Z48.89    Thyroid disease E07.9    Essential hypertension I10    Hypercholesterolemia E78.00    Musculoskeletal pain M79.18    DDD (degenerative disc disease), cervical M50.30    Status post cervical spinal fusion Z98.1    Cervicogenic headache G44.86    Cervical dystonia G24.3    Neck pain M54.2    Esophageal dysfunction K22.4    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    Supraventricular tachycardia (HCC) I47.1    Gastroesophageal reflux disease with esophagitis K21.00    Anxiety F41.9    Weakness generalized R53.1    Other chest pain R07.89    Generalized abdominal pain R10.84    Other headache syndrome G44.89    Non-intractable vomiting with nausea R11.2    Malaise R53.81    Atrial fibrillation with RVR (HCC) I48.91    Gastroesophageal reflux disease without esophagitis K21.9    Nausea R11.0    Frequent headaches R51.9    Age-related nuclear cataract, bilateral H25.13    Nipple discharge N64.52        77year old woman with right nipple discharge. Based on description and exam, I think it is likely Ms. Lili Davey has fibrocystic changes of the breast. We discussed this process in detail. We discussed that it is not malignant. It may cause \"lumpiness\" of the breasts and may cause pain and/or nipple discharge. As it is spontaneous and bothering Ms. Jl Peraza, she is interested in retroareolar excisional biopsy. Risks, benefits and options were discussed in detail to include, but not limited to, bleeding, infection, risks of anesthesia, injury to surrounding structures and other unforeseen events such as stroke, heart attack or death. She was asked to call with questions or concerns. no

## 2021-10-27 ENCOUNTER — OFFICE VISIT (OUTPATIENT)
Dept: SURGERY | Age: 66
End: 2021-10-27
Payer: MEDICARE

## 2021-10-27 VITALS
WEIGHT: 153 LBS | HEIGHT: 65 IN | RESPIRATION RATE: 16 BRPM | TEMPERATURE: 97.9 F | SYSTOLIC BLOOD PRESSURE: 132 MMHG | BODY MASS INDEX: 25.49 KG/M2 | DIASTOLIC BLOOD PRESSURE: 80 MMHG

## 2021-10-27 DIAGNOSIS — N64.52 NIPPLE DISCHARGE: Primary | ICD-10-CM

## 2021-10-27 PROCEDURE — 1101F PT FALLS ASSESS-DOCD LE1/YR: CPT | Performed by: SURGERY

## 2021-10-27 PROCEDURE — G8752 SYS BP LESS 140: HCPCS | Performed by: SURGERY

## 2021-10-27 PROCEDURE — G8432 DEP SCR NOT DOC, RNG: HCPCS | Performed by: SURGERY

## 2021-10-27 PROCEDURE — 99204 OFFICE O/P NEW MOD 45 MIN: CPT | Performed by: SURGERY

## 2021-10-27 PROCEDURE — G8419 CALC BMI OUT NRM PARAM NOF/U: HCPCS | Performed by: SURGERY

## 2021-10-27 PROCEDURE — G8427 DOCREV CUR MEDS BY ELIG CLIN: HCPCS | Performed by: SURGERY

## 2021-10-27 PROCEDURE — G9899 SCRN MAM PERF RSLTS DOC: HCPCS | Performed by: SURGERY

## 2021-10-27 PROCEDURE — G8536 NO DOC ELDER MAL SCRN: HCPCS | Performed by: SURGERY

## 2021-10-27 PROCEDURE — 1090F PRES/ABSN URINE INCON ASSESS: CPT | Performed by: SURGERY

## 2021-10-27 PROCEDURE — G8399 PT W/DXA RESULTS DOCUMENT: HCPCS | Performed by: SURGERY

## 2021-10-27 PROCEDURE — 3017F COLORECTAL CA SCREEN DOC REV: CPT | Performed by: SURGERY

## 2021-10-27 PROCEDURE — G8754 DIAS BP LESS 90: HCPCS | Performed by: SURGERY

## 2021-10-27 NOTE — LETTER
10/27/2021 10:35 AM    Patient:  Alanis Augustine   YOB: 1955  Date of Visit: 10/27/2021      Ingrid Vasquez MD  [phone]  Marek Belle    Dear Ingrid Vasquez MD,      I had the pleasure of seeing Ms. José Miguel Lee in my office today for her nipple discharge. I am including a copy of my office visit today. If you have questions, please do not hesitate to call me. I look forward to following Ms. Sky Yanes along with you and will keep you updated as to her progress.            Sincerely,      Angelo King MD

## 2021-10-29 ENCOUNTER — TELEPHONE (OUTPATIENT)
Dept: SURGERY | Age: 66
End: 2021-10-29

## 2021-10-29 NOTE — TELEPHONE ENCOUNTER
Left voicemail message for Ms. Cummins to contact office to schedule surgery with Dr. Makayla Potter.

## 2021-11-04 ENCOUNTER — TELEPHONE (OUTPATIENT)
Dept: SURGERY | Age: 66
End: 2021-11-04

## 2021-11-10 ENCOUNTER — TELEPHONE (OUTPATIENT)
Dept: SURGERY | Age: 66
End: 2021-11-10

## 2021-11-10 NOTE — TELEPHONE ENCOUNTER
Spoke to Ms. Casa Browne to inform I'm awaiting admin confirmation of Dr. Georgana Blizzard availability and I'll contact her upon receipt of confirmation.

## 2021-11-15 ENCOUNTER — TELEPHONE (OUTPATIENT)
Dept: SURGERY | Age: 66
End: 2021-11-15

## 2021-11-15 DIAGNOSIS — N64.52 NIPPLE DISCHARGE: Primary | ICD-10-CM

## 2021-11-15 NOTE — TELEPHONE ENCOUNTER
Left message for Ms. Cummins to contact our office re: surgery with Dr. Dale Crystal at [facility].

## 2021-11-17 ENCOUNTER — TELEPHONE (OUTPATIENT)
Dept: SURGERY | Age: 66
End: 2021-11-17

## 2021-11-17 NOTE — TELEPHONE ENCOUNTER
Returning your call. Quality 130: Documentation Of Current Medications In The Medical Record: Current Medications Documented Quality 431: Preventive Care And Screening: Unhealthy Alcohol Use - Screening: Patient identified as an unhealthy alcohol user when screened for unhealthy alcohol use using a systematic screening method and received brief counseling Quality 226: Preventive Care And Screening: Tobacco Use: Screening And Cessation Intervention: Patient screened for tobacco use and is an ex/non-smoker Detail Level: Detailed

## 2021-11-17 NOTE — TELEPHONE ENCOUNTER
Spoke to Ms. Curly Gilbert regarding her inquiry if she's able to go have labs done tomorrow when she's scheduled to have covid test.  I explained per hospital new policy if she's she's fully vaccinated for covid and as long as she has her vaccination card that she can present to be scanned in chart she will not be required to have a pre op covid test.  Ms. Curly Gilbert verbalized understanding.

## 2021-11-18 ENCOUNTER — HOSPITAL ENCOUNTER (OUTPATIENT)
Dept: LAB | Age: 66
Discharge: HOME | End: 2021-11-18
Payer: MEDICARE

## 2021-11-18 DIAGNOSIS — E55.9 VITAMIN D DEFICIENCY: ICD-10-CM

## 2021-11-18 DIAGNOSIS — N64.52 NIPPLE DISCHARGE: ICD-10-CM

## 2021-11-18 DIAGNOSIS — I10 ESSENTIAL HYPERTENSION: ICD-10-CM

## 2021-11-18 LAB
25(OH)D3 SERPL-MCNC: 20.4 NG/ML (ref 30–100)
ALBUMIN SERPL-MCNC: 3.7 G/DL (ref 3.4–5)
ALBUMIN/GLOB SERPL: 1 {RATIO} (ref 0.8–1.7)
ALP SERPL-CCNC: 93 U/L (ref 45–117)
ALT SERPL-CCNC: 17 U/L (ref 13–56)
ANION GAP SERPL CALC-SCNC: 6 MMOL/L (ref 3–18)
ANION GAP SERPL CALC-SCNC: 6 MMOL/L (ref 3–18)
AST SERPL-CCNC: 26 U/L (ref 10–38)
ATRIAL RATE: 64 BPM
BASOPHILS # BLD: 0 K/UL (ref 0–0.1)
BASOPHILS NFR BLD: 0 % (ref 0–2)
BILIRUB SERPL-MCNC: 0.5 MG/DL (ref 0.2–1)
BUN SERPL-MCNC: 9 MG/DL (ref 7–18)
BUN SERPL-MCNC: 9 MG/DL (ref 7–18)
BUN/CREAT SERPL: 10 (ref 12–20)
BUN/CREAT SERPL: 10 (ref 12–20)
CALCIUM SERPL-MCNC: 8.9 MG/DL (ref 8.5–10.1)
CALCIUM SERPL-MCNC: 8.9 MG/DL (ref 8.5–10.1)
CALCULATED P AXIS, ECG09: 47 DEGREES
CALCULATED R AXIS, ECG10: 13 DEGREES
CALCULATED T AXIS, ECG11: 64 DEGREES
CHLORIDE SERPL-SCNC: 105 MMOL/L (ref 100–111)
CHLORIDE SERPL-SCNC: 105 MMOL/L (ref 100–111)
CHOLEST SERPL-MCNC: 190 MG/DL
CO2 SERPL-SCNC: 27 MMOL/L (ref 21–32)
CO2 SERPL-SCNC: 27 MMOL/L (ref 21–32)
CREAT SERPL-MCNC: 0.92 MG/DL (ref 0.6–1.3)
CREAT SERPL-MCNC: 0.94 MG/DL (ref 0.6–1.3)
DIAGNOSIS, 93000: NORMAL
DIFFERENTIAL METHOD BLD: ABNORMAL
EOSINOPHIL # BLD: 0.1 K/UL (ref 0–0.4)
EOSINOPHIL NFR BLD: 1 % (ref 0–5)
ERYTHROCYTE [DISTWIDTH] IN BLOOD BY AUTOMATED COUNT: 13.6 % (ref 11.6–14.5)
GLOBULIN SER CALC-MCNC: 3.6 G/DL (ref 2–4)
GLUCOSE SERPL-MCNC: 80 MG/DL (ref 74–99)
GLUCOSE SERPL-MCNC: 80 MG/DL (ref 74–99)
HCT VFR BLD AUTO: 39.9 % (ref 35–45)
HDLC SERPL-MCNC: 81 MG/DL (ref 40–60)
HDLC SERPL: 2.3 {RATIO} (ref 0–5)
HGB BLD-MCNC: 12.5 G/DL (ref 12–16)
IMM GRANULOCYTES # BLD AUTO: 0 K/UL
IMM GRANULOCYTES NFR BLD AUTO: 0 %
LDLC SERPL CALC-MCNC: 91 MG/DL (ref 0–100)
LIPID PROFILE,FLP: ABNORMAL
LYMPHOCYTES # BLD: 2.4 K/UL (ref 0.9–3.6)
LYMPHOCYTES NFR BLD: 50 % (ref 21–52)
MCH RBC QN AUTO: 30 PG (ref 24–34)
MCHC RBC AUTO-ENTMCNC: 31.3 G/DL (ref 31–37)
MCV RBC AUTO: 95.9 FL (ref 78–100)
MONOCYTES # BLD: 0.4 K/UL (ref 0.05–1.2)
MONOCYTES NFR BLD: 8 % (ref 3–10)
NEUTS SEG # BLD: 2.1 K/UL (ref 1.8–8)
NEUTS SEG NFR BLD: 41 % (ref 40–73)
NRBC # BLD: 0 K/UL (ref 0–0.01)
NRBC BLD-RTO: 0 PER 100 WBC
P-R INTERVAL, ECG05: 114 MS
PLATELET # BLD AUTO: 248 K/UL (ref 135–420)
PLATELET COMMENTS,PCOM: ABNORMAL
PMV BLD AUTO: 11 FL (ref 9.2–11.8)
POTASSIUM SERPL-SCNC: 4.1 MMOL/L (ref 3.5–5.5)
POTASSIUM SERPL-SCNC: 4.2 MMOL/L (ref 3.5–5.5)
PROT SERPL-MCNC: 7.3 G/DL (ref 6.4–8.2)
Q-T INTERVAL, ECG07: 420 MS
QRS DURATION, ECG06: 82 MS
QTC CALCULATION (BEZET), ECG08: 433 MS
RBC # BLD AUTO: 4.16 M/UL (ref 4.2–5.3)
RBC MORPH BLD: ABNORMAL
SODIUM SERPL-SCNC: 138 MMOL/L (ref 136–145)
SODIUM SERPL-SCNC: 138 MMOL/L (ref 136–145)
TRIGL SERPL-MCNC: 90 MG/DL (ref ?–150)
VENTRICULAR RATE, ECG03: 64 BPM
VLDLC SERPL CALC-MCNC: 18 MG/DL
WBC # BLD AUTO: 5 K/UL (ref 4.6–13.2)

## 2021-11-18 PROCEDURE — 80061 LIPID PANEL: CPT

## 2021-11-18 PROCEDURE — 82306 VITAMIN D 25 HYDROXY: CPT

## 2021-11-18 PROCEDURE — 93005 ELECTROCARDIOGRAM TRACING: CPT

## 2021-11-18 PROCEDURE — 85025 COMPLETE CBC W/AUTO DIFF WBC: CPT

## 2021-11-18 PROCEDURE — 80053 COMPREHEN METABOLIC PANEL: CPT

## 2021-11-18 PROCEDURE — 36415 COLL VENOUS BLD VENIPUNCTURE: CPT

## 2021-11-22 ENCOUNTER — ANESTHESIA EVENT (OUTPATIENT)
Dept: SURGERY | Age: 66
End: 2021-11-22
Payer: MEDICARE

## 2021-11-22 NOTE — PERIOP NOTES
PRE-SURGICAL INSTRUCTIONS        Patient's Name:  Jordy Rubi      VRCIB'J Date:  11/22/2021            Covid Testing Date and Time:    Surgery Date:  11/23/2021                1. Do NOT eat or drink anything, including candy, gum, or ice chips after midnight on 1123, unless you have specific instructions from your surgeon or anesthesia provider to do so.  2. You may brush your teeth before coming to the hospital.  3. No smoking 24 hours prior to the day of surgery. 4. No alcohol 24 hours prior to the day of surgery. 5. No recreational drugs for one week prior to the day of surgery. 6. Leave all valuables, including money/purse, at home. 7. Remove all jewelry, nail polish, acrylic nails, and makeup (including mascara); no lotions powders, deodorant, or perfume/cologne/after shave on the skin. 8. Follow instruction for Hibiclens washes and CHG wipes from surgeon's office. 9. Glasses/contact lenses and dentures may be worn to the hospital.  They will be removed prior to surgery. 10. Call your doctor if symptoms of a cold or illness develop within 24-48 hours prior to your surgery. 11.  If you are having an outpatient procedure, please make arrangements for a responsible ADULT TO [address] [place] [address] and stay with you for 24 hours after your surgery. 12. ONE VISITOR in the hospital at this time for outpatient procedures. Exceptions may be made for surgical admissions, per nursing unit guidelines      Special Instructions:      Bring list of CURRENT medications. Bring any pertinent legal medical records. Take these medications the morning of surgery with a sip of water:  Per office      On the day of surgery, come in the main entrance of [facility]. Let the  at the desk know you are there for surgery. A staff member will come escort you to the surgical area on the second floor.     If you have any questions or concerns, please do not hesitate to call:     (Prior to the day of surgery) [facility] department:  [phone]   (Day of surgery) Pre-Op department:  [phone]    These surgical instructions were reviewed with the patient during the [facility] phone call.

## 2021-11-23 ENCOUNTER — HOSPITAL ENCOUNTER (OUTPATIENT)
Age: 66
Setting detail: OUTPATIENT SURGERY
Discharge: HOME | End: 2021-11-23
Attending: SURGERY | Admitting: SURGERY
Payer: MEDICARE

## 2021-11-23 ENCOUNTER — ANESTHESIA (OUTPATIENT)
Dept: SURGERY | Age: 66
End: 2021-11-23
Payer: MEDICARE

## 2021-11-23 VITALS
RESPIRATION RATE: 20 BRPM | OXYGEN SATURATION: 100 % | BODY MASS INDEX: 25.83 KG/M2 | HEIGHT: 65 IN | SYSTOLIC BLOOD PRESSURE: 121 MMHG | WEIGHT: 155 LBS | DIASTOLIC BLOOD PRESSURE: 68 MMHG | TEMPERATURE: 97.5 F | HEART RATE: 68 BPM

## 2021-11-23 DIAGNOSIS — N64.52 NIPPLE DISCHARGE: ICD-10-CM

## 2021-11-23 PROCEDURE — 77030010509 HC AIRWY LMA MSK TELE -A: Performed by: ANESTHESIOLOGY

## 2021-11-23 PROCEDURE — 74011000272 HC RX REV CODE- 272: Performed by: SURGERY

## 2021-11-23 PROCEDURE — 19120 REMOVAL OF BREAST LESION: CPT | Performed by: SURGERY

## 2021-11-23 PROCEDURE — 76010000138 HC OR TIME 0.5 TO 1 HR: Performed by: SURGERY

## 2021-11-23 PROCEDURE — 77030040922 HC BLNKT HYPOTHRM STRY -A: Performed by: SURGERY

## 2021-11-23 PROCEDURE — 2709999900 HC NON-CHARGEABLE SUPPLY: Performed by: SURGERY

## 2021-11-23 PROCEDURE — 77030010507 HC ADH SKN DERMBND J&J -B: Performed by: SURGERY

## 2021-11-23 PROCEDURE — 77030002933 HC SUT MCRYL J&J -A: Performed by: SURGERY

## 2021-11-23 PROCEDURE — 76210000021 HC REC RM PH II 0.5 TO 1 HR: Performed by: SURGERY

## 2021-11-23 PROCEDURE — 74011000250 HC RX REV CODE- 250: Performed by: SURGERY

## 2021-11-23 PROCEDURE — 74011250637 HC RX REV CODE- 250/637: Performed by: NURSE ANESTHETIST, CERTIFIED REGISTERED

## 2021-11-23 PROCEDURE — 74011250636 HC RX REV CODE- 250/636: Performed by: NURSE ANESTHETIST, CERTIFIED REGISTERED

## 2021-11-23 PROCEDURE — 77030002996 HC SUT SLK J&J -A: Performed by: SURGERY

## 2021-11-23 PROCEDURE — 88307 TISSUE EXAM BY PATHOLOGIST: CPT

## 2021-11-23 PROCEDURE — 76210000006 HC OR PH I REC 0.5 TO 1 HR: Performed by: SURGERY

## 2021-11-23 PROCEDURE — 77030040201 HC PRB SET LOCALIZER DISP BIPT -D: Performed by: SURGERY

## 2021-11-23 PROCEDURE — 77030031139 HC SUT VCRL2 J&J -A: Performed by: SURGERY

## 2021-11-23 PROCEDURE — 00400 ANES INTEGUMENTARY SYS NOS: CPT | Performed by: ANESTHESIOLOGY

## 2021-11-23 PROCEDURE — 76060000032 HC ANESTHESIA 0.5 TO 1 HR: Performed by: SURGERY

## 2021-11-23 PROCEDURE — 74011000258 HC RX REV CODE- 258: Performed by: SURGERY

## 2021-11-23 PROCEDURE — 88305 TISSUE EXAM BY PATHOLOGIST: CPT

## 2021-11-23 PROCEDURE — 77030040361 HC SLV COMPR DVT MDII -B: Performed by: SURGERY

## 2021-11-23 PROCEDURE — 00400 ANES INTEGUMENTARY SYS NOS: CPT | Performed by: NURSE ANESTHETIST, CERTIFIED REGISTERED

## 2021-11-23 PROCEDURE — 74011000250 HC RX REV CODE- 250: Performed by: NURSE ANESTHETIST, CERTIFIED REGISTERED

## 2021-11-23 RX ORDER — INSULIN LISPRO 100 [IU]/ML
INJECTION, SOLUTION INTRAVENOUS; SUBCUTANEOUS ONCE
Status: DISCONTINUED | OUTPATIENT
Start: 2021-11-23 | End: 2021-11-23 | Stop reason: HOSPADM

## 2021-11-23 RX ORDER — HYDROMORPHONE HYDROCHLORIDE 2 MG/ML
0.2 INJECTION, SOLUTION INTRAMUSCULAR; INTRAVENOUS; SUBCUTANEOUS AS NEEDED
Status: DISCONTINUED | OUTPATIENT
Start: 2021-11-23 | End: 2021-11-23 | Stop reason: HOSPADM

## 2021-11-23 RX ORDER — EPHEDRINE SULFATE/0.9% NACL/PF 50 MG/5 ML
SYRINGE (ML) INTRAVENOUS AS NEEDED
Status: DISCONTINUED | OUTPATIENT
Start: 2021-11-23 | End: 2021-11-23 | Stop reason: HOSPADM

## 2021-11-23 RX ORDER — PROPOFOL 10 MG/ML
INJECTION, EMULSION INTRAVENOUS AS NEEDED
Status: DISCONTINUED | OUTPATIENT
Start: 2021-11-23 | End: 2021-11-23 | Stop reason: HOSPADM

## 2021-11-23 RX ORDER — LIDOCAINE HYDROCHLORIDE 20 MG/ML
INJECTION, SOLUTION EPIDURAL; INFILTRATION; INTRACAUDAL; PERINEURAL AS NEEDED
Status: DISCONTINUED | OUTPATIENT
Start: 2021-11-23 | End: 2021-11-23 | Stop reason: HOSPADM

## 2021-11-23 RX ORDER — OXYCODONE AND ACETAMINOPHEN 5; 325 MG/1; MG/1
1 TABLET ORAL AS NEEDED
Status: DISCONTINUED | OUTPATIENT
Start: 2021-11-23 | End: 2021-11-23 | Stop reason: HOSPADM

## 2021-11-23 RX ORDER — MAGNESIUM SULFATE 100 %
4 CRYSTALS MISCELLANEOUS AS NEEDED
Status: DISCONTINUED | OUTPATIENT
Start: 2021-11-23 | End: 2021-11-23 | Stop reason: HOSPADM

## 2021-11-23 RX ORDER — ONDANSETRON 2 MG/ML
INJECTION INTRAMUSCULAR; INTRAVENOUS AS NEEDED
Status: DISCONTINUED | OUTPATIENT
Start: 2021-11-23 | End: 2021-11-23 | Stop reason: HOSPADM

## 2021-11-23 RX ORDER — DEXAMETHASONE SODIUM PHOSPHATE 4 MG/ML
INJECTION, SOLUTION INTRA-ARTICULAR; INTRALESIONAL; INTRAMUSCULAR; INTRAVENOUS; SOFT TISSUE AS NEEDED
Status: DISCONTINUED | OUTPATIENT
Start: 2021-11-23 | End: 2021-11-23 | Stop reason: HOSPADM

## 2021-11-23 RX ORDER — OXYCODONE AND ACETAMINOPHEN 5; 325 MG/1; MG/1
1 TABLET ORAL
Qty: 5 TABLET | Refills: 0 | Status: SHIPPED | OUTPATIENT
Start: 2021-11-23 | End: 2021-11-26

## 2021-11-23 RX ORDER — HYDROMORPHONE HYDROCHLORIDE 2 MG/ML
0.5 INJECTION, SOLUTION INTRAMUSCULAR; INTRAVENOUS; SUBCUTANEOUS
Status: DISCONTINUED | OUTPATIENT
Start: 2021-11-23 | End: 2021-11-23 | Stop reason: HOSPADM

## 2021-11-23 RX ORDER — SODIUM CHLORIDE, SODIUM LACTATE, POTASSIUM CHLORIDE, CALCIUM CHLORIDE 600; 310; 30; 20 MG/100ML; MG/100ML; MG/100ML; MG/100ML
50 INJECTION, SOLUTION INTRAVENOUS CONTINUOUS
Status: DISCONTINUED | OUTPATIENT
Start: 2021-11-23 | End: 2021-11-23 | Stop reason: HOSPADM

## 2021-11-23 RX ORDER — ONDANSETRON 2 MG/ML
4 INJECTION INTRAMUSCULAR; INTRAVENOUS
Status: DISCONTINUED | OUTPATIENT
Start: 2021-11-23 | End: 2021-11-23 | Stop reason: HOSPADM

## 2021-11-23 RX ORDER — LIDOCAINE HYDROCHLORIDE AND EPINEPHRINE 10; 10 MG/ML; UG/ML
INJECTION, SOLUTION INFILTRATION; PERINEURAL AS NEEDED
Status: DISCONTINUED | OUTPATIENT
Start: 2021-11-23 | End: 2021-11-23 | Stop reason: HOSPADM

## 2021-11-23 RX ORDER — SODIUM CHLORIDE, SODIUM LACTATE, POTASSIUM CHLORIDE, CALCIUM CHLORIDE 600; 310; 30; 20 MG/100ML; MG/100ML; MG/100ML; MG/100ML
100 INJECTION, SOLUTION INTRAVENOUS CONTINUOUS
Status: DISCONTINUED | OUTPATIENT
Start: 2021-11-23 | End: 2021-11-23 | Stop reason: HOSPADM

## 2021-11-23 RX ORDER — SODIUM CHLORIDE 0.9 % (FLUSH) 0.9 %
5-40 SYRINGE (ML) INJECTION AS NEEDED
Status: DISCONTINUED | OUTPATIENT
Start: 2021-11-23 | End: 2021-11-23 | Stop reason: HOSPADM

## 2021-11-23 RX ORDER — LIDOCAINE HYDROCHLORIDE 10 MG/ML
0.1 INJECTION, SOLUTION EPIDURAL; INFILTRATION; INTRACAUDAL; PERINEURAL AS NEEDED
Status: DISCONTINUED | OUTPATIENT
Start: 2021-11-23 | End: 2021-11-23 | Stop reason: HOSPADM

## 2021-11-23 RX ORDER — SODIUM CHLORIDE 0.9 % (FLUSH) 0.9 %
5-40 SYRINGE (ML) INJECTION EVERY 8 HOURS
Status: DISCONTINUED | OUTPATIENT
Start: 2021-11-23 | End: 2021-11-23 | Stop reason: HOSPADM

## 2021-11-23 RX ORDER — DIPHENHYDRAMINE HYDROCHLORIDE 50 MG/ML
12.5 INJECTION, SOLUTION INTRAMUSCULAR; INTRAVENOUS
Status: DISCONTINUED | OUTPATIENT
Start: 2021-11-23 | End: 2021-11-23 | Stop reason: HOSPADM

## 2021-11-23 RX ORDER — FENTANYL CITRATE 50 UG/ML
INJECTION, SOLUTION INTRAMUSCULAR; INTRAVENOUS AS NEEDED
Status: DISCONTINUED | OUTPATIENT
Start: 2021-11-23 | End: 2021-11-23 | Stop reason: HOSPADM

## 2021-11-23 RX ORDER — DEXTROSE 50 % IN WATER (D50W) INTRAVENOUS SYRINGE
25-50 AS NEEDED
Status: DISCONTINUED | OUTPATIENT
Start: 2021-11-23 | End: 2021-11-23 | Stop reason: HOSPADM

## 2021-11-23 RX ADMIN — HYDROMORPHONE HYDROCHLORIDE 0.5 MG: 2 INJECTION, SOLUTION INTRAMUSCULAR; INTRAVENOUS; SUBCUTANEOUS at 14:10

## 2021-11-23 RX ADMIN — SODIUM CHLORIDE, SODIUM LACTATE, POTASSIUM CHLORIDE, AND CALCIUM CHLORIDE 50 ML/HR: 600; 310; 30; 20 INJECTION, SOLUTION INTRAVENOUS at 11:20

## 2021-11-23 RX ADMIN — PROPOFOL 50 MG: 10 INJECTION, EMULSION INTRAVENOUS at 13:30

## 2021-11-23 RX ADMIN — Medication 10 MG: at 13:44

## 2021-11-23 RX ADMIN — FENTANYL CITRATE 25 MCG: 50 INJECTION, SOLUTION INTRAMUSCULAR; INTRAVENOUS at 13:53

## 2021-11-23 RX ADMIN — FENTANYL CITRATE 25 MCG: 50 INJECTION, SOLUTION INTRAMUSCULAR; INTRAVENOUS at 13:30

## 2021-11-23 RX ADMIN — ONDANSETRON 4 MG: 2 INJECTION INTRAMUSCULAR; INTRAVENOUS at 13:27

## 2021-11-23 RX ADMIN — PROPOFOL 150 MG: 10 INJECTION, EMULSION INTRAVENOUS at 13:19

## 2021-11-23 RX ADMIN — FENTANYL CITRATE 25 MCG: 50 INJECTION, SOLUTION INTRAMUSCULAR; INTRAVENOUS at 13:26

## 2021-11-23 RX ADMIN — DEXAMETHASONE SODIUM PHOSPHATE 4 MG: 4 INJECTION, SOLUTION INTRAMUSCULAR; INTRAVENOUS at 13:27

## 2021-11-23 RX ADMIN — LIDOCAINE HYDROCHLORIDE 60 MG: 20 INJECTION, SOLUTION EPIDURAL; INFILTRATION; INTRACAUDAL; PERINEURAL at 13:19

## 2021-11-23 RX ADMIN — CLINDAMYCIN 600 MG: 150 INJECTION, SOLUTION INTRAMUSCULAR; INTRAVENOUS at 13:25

## 2021-11-23 RX ADMIN — OXYCODONE HYDROCHLORIDE AND ACETAMINOPHEN 1 TABLET: 5; 325 TABLET ORAL at 15:38

## 2021-11-23 RX ADMIN — FENTANYL CITRATE 25 MCG: 50 INJECTION, SOLUTION INTRAMUSCULAR; INTRAVENOUS at 13:12

## 2021-11-23 NOTE — OP NOTES
Date of Procedure: 11/23/2021  Preoperative Diagnosis: N64.52 NIPPLE DISCHARGE  Postoperative Diagnosis: N64.52 NIPPLE DISCHARGE  Procedure(s):  Procedure(s):  RIGHT RETROAREOLAR EXCISIONAL BIOPSY    Surgeon(s) and Role:      Angelo King MD - Primary         Surgical Staff: Circ-1: Micki Ryder RN  Circ-Relief: Caridad Walters RN  Scrub Tech-1: [facility]  Surg Asst-1: Gabrieljack Caballero      Event Time In     Event Time In   Incision Start 1331   Incision Close      Event Time In   Patient In - Facility (Arrived) 1006   Patient In - Pre-op 1059   Anesthesia Start [id number]   Patient Ready for Pre-op Visitors [phone]   Patient Ready for OR 1311   Surgeon Available 1311   Patient Out - Pre-op 1311   Patient In - OR 1312   Surgeon In OR 1323   Incision Start 1331   Incision Close    Surgeon out of OR    Patient Out - OR    Anesthesia Stop    Patient In - Phase I    Notice to Anesthesia    Care Complete - Phase I    Patient Out - Phase I    Patient In - Phase II    Patient Tolerates Liquids    Patient Ready for Visitors    Patient Education Complete    Patient Voided    Care Complete - Phase II    Patient Out - Phase II    End of [address]    Patient In - Overflow    Patient Out - Overflow        Anesthesia: General  Anesthesia staff: Anesthesiologist: Nathanael Jorgensen MD  CRNA: Justyn Oewns CRNA  Estimated Blood Loss: 2cc  Specimens:   ID Type Source Tests Collected by Time Destination   1 : right breast tissue Preservative Breast  Rody Jeffrey MD 11/23/2021 1338 Pathology        Findings: retroareolar tissue    Complications: none noted  Implants: * No implants in log *        The patient was identified in the preoperative holding area and the risks again were reviewed with the patient who understands and agrees. She understands the risk of bleeding, infection, damage to surrounding structures, hematoma/seroma formation, and the possibility of missing the lesion in question.  She also understands additional surgery may be indicated. She was also marked by me to confirm the site and the procedure. The patient was taken to the operating suite and placed supine on the table. Compression stockings were placed and anesthesia induced without complication. She was then prepped and draped in the usual sterile fashion and an appropriate time-out was performed to confirm the patient, the side, and the procedure. Local anesthetic was infiltrated. An incision was made at the lateral circumareolar position in the right breast. We then dissected into the subcutaneous tissue superficially retroareolar towards the lesion of concern. We then used electrocautery to remove a core of breast tissue around the lesion with attention to margins. There was excellent hemostasis and the specimen was marked for orientation on removal of the tissue. This was then handed off the field for pathologic analysis. A clip was placed to reta the site. The breast tissue closed with 2-0 vicryl pursestring. All sponge and instrument counts were reported to me as correct. We continued the closure with a 3-0 vicryl suture, followed by 4-0 monocryl suture. Dermabond was then applied. The patient was taken to recovery in good condition.

## 2021-11-23 NOTE — ANESTHESIA POSTPROCEDURE EVALUATION
Procedure(s):  RIGHT RETROAREOLAR EXCISIONAL BIOPSY. general    Anesthesia Post Evaluation      Multimodal analgesia: multimodal analgesia used between 6 hours prior to anesthesia start to PACU discharge  Patient location during evaluation: bedside  Patient participation: complete - patient participated  Level of consciousness: awake  Pain score: 5  Pain management: adequate  Airway patency: patent  Anesthetic complications: no  Cardiovascular status: stable  Respiratory status: acceptable  Hydration status: acceptable  Post anesthesia nausea and vomiting:  none  Final Post Anesthesia Temperature Assessment:  Normothermia (36.0-37.5 degrees C)      INITIAL Post-op Vital signs:   Vitals Value Taken Time   /63 11/23/21 1445   Temp     Pulse 81 11/23/21 1449   Resp 13 11/23/21 1449   SpO2 100 % 11/23/21 1449   Vitals shown include unvalidated device data.

## 2021-11-23 NOTE — ANESTHESIA PREPROCEDURE EVALUATION
Relevant Problems   NEUROLOGY   (+) Cervicogenic headache   (+) Frequent headaches   (+) Other headache syndrome      CARDIOVASCULAR   (+) Atrial fibrillation with RVR (HCC)   (+) Essential hypertension   (+) Supraventricular tachycardia (HCC)      GASTROINTESTINAL   (+) Gastroesophageal reflux disease with esophagitis   (+) Gastroesophageal reflux disease without esophagitis       Anesthetic History   No history of anesthetic complications            Review of Systems / Medical History  Patient summary reviewed and pertinent labs reviewed    Pulmonary  Within defined limits                 Neuro/Psych   Within defined limits           Cardiovascular    Hypertension        Dysrhythmias : atrial fibrillation      Exercise tolerance: >4 METS     GI/Hepatic/Renal     GERD           Endo/Other      Hypothyroidism       Other Findings              Physical Exam    Airway  Mallampati: II  TM Distance: 4 - 6 cm  Neck ROM: normal range of motion   Mouth opening: Normal     Cardiovascular  Regular rate and rhythm,  S1 and S2 normal,  no murmur, click, rub, or gallop  Rhythm: regular  Rate: normal         Dental    Dentition: Edentulous, Full lower dentures and Full upper dentures     Pulmonary  Breath sounds clear to auscultation               Abdominal  GI exam deferred       Other Findings            Anesthetic Plan    ASA: 3  Anesthesia type: general          Induction: Intravenous  Anesthetic plan and risks discussed with: Patient

## 2021-11-23 NOTE — INTERVAL H&P NOTE
Update History & Physical    The Patient's History and Physical  was reviewed with the patient and I examined the patient. There was no change. The surgical site was confirmed by the patient and me. Patient requesting Percocet for pain postoperatively. Allergies noted, confirmed patient has taken and tolerated this or similar medication in the past and this is her choice for narcotic pain medication. We discussed tylenol and ibuprofen may be sufficient, assuming she has not been told to avoid either medication (generally due to concerns for kidneys, stomach or liver). I have advised her to limit acetaminophen from all sources to less than 4,000mg per day and not to drive while taking narcotic pain medication. I have recommended taking narcotic pain medication sparingly and only if needed. If using the narcotic pain medication, I have recommended taking with food. We discussed this class of medication can constipate, so I have encouraged use of Miralax or Milk of Magnesia if constipation occurs. This class of medication can also be addicting. Again I recommend taking narcotic pain medication sparingly and only if needed. Plan:  The risk, benefits, expected outcome, and alternative to the recommended procedure have been discussed with the patient. Patient understands and wants to proceed with the procedure.     Electronically signed by Jax Licona MD on 11/23/2021 at 12:35 PM

## 2021-11-23 NOTE — DISCHARGE INSTRUCTIONS
Patient Education        Open Breast Biopsy: What to Expect at Home  Your Recovery  An open breast biopsy is surgery to remove abnormal breast tissue. The breast tissue will be sent to a lab, where a doctor will look at the tissue under a microscope to check for breast cancer. Your doctor may have some answers right away. But it can take up to 1 to 2 weeks to get the final results. Your doctor will discuss the results with you. For a few days after the surgery, you will probably feel tired and have some pain. The skin around the cut (incision) may feel firm, swollen, and tender. The area may be bruised. Tenderness should go away in about a week, and the bruising will fade within two weeks. Firmness and swelling may last 6 to 8 weeks. Your incision may have been closed with strips of tape or stitches. If you have strips of tape on the incision, leave the tape on for a week or until it falls off. If you have stitches, your doctor will remove them in about a week. This care sheet gives you a general idea about how long it will take for you to recover. But each person recovers at a different pace. Follow the steps below to get better as quickly as possible. How can you care for yourself at home? Activity    · Rest when you feel tired. Getting enough sleep will help you recover.     · Try to walk each day. Start by walking a little more than you did the day before. Bit by bit, increase the amount you walk. Walking boosts blood flow and helps prevent pneumonia and constipation.     · For 2 weeks, avoid strenuous activities that put pressure on your chest or that involve vigorous movement of your upper body and arm on the side of the biopsy. Examples of these might include strenuous housework, holding an active child, jogging, or aerobic exercise.     · For 2 weeks, avoid lifting anything that would make you strain.  This may include heavy grocery bags and milk containers, a heavy briefcase or backpack, cat litter or dog food bags, a vacuum , or a child.     · Ask your doctor when you can drive again.     · You will probably need to take 1 or 2 days off from work. This depends on the type of work you do and how you feel. Diet    · You can eat your normal diet. If your stomach is upset, try bland, low-fat foods like plain rice, broiled chicken, toast, and yogurt. Medicines    · Your doctor will tell you if and when you can restart your medicines. He or she will also give you instructions about taking any new medicines.     · If you take aspirin or some other blood thinner, ask your doctor if and when to start taking it again. Make sure that you understand exactly what your doctor wants you to do.     · Be safe with medicines. Take pain medicines exactly as directed. ? If the doctor gave you a prescription medicine for pain, take it as prescribed. ? If you are not taking a prescription pain medicine, ask your doctor if you can take an over-the-counter medicine.     · If you think your pain medicine is making you sick to your stomach:  ? Take your medicine after meals (unless your doctor has told you not to). ? Ask your doctor for a different pain medicine.     · If your doctor prescribed antibiotics, take them as directed. Do not stop taking them just because you feel better. You need to take the full course of antibiotics. Incision care    · If you have strips of tape on the incision, leave the tape on for a week or until it falls off.     · Wash the area daily with warm, soapy water, and pat it dry. Don't use hydrogen peroxide or alcohol, which can slow healing. You may cover the area with a gauze bandage if it weeps or rubs against clothing. Change the bandage every day.     · Keep the area clean and dry. Other instructions    · For the first 3 days after surgery, wear a supportive bra all the time, even at night. Follow-up care is a key part of your treatment and safety.  Be sure to make and go to all appointments, and call your doctor if you are having problems. It's also a good idea to know your test results and keep a list of the medicines you take. When should you call for help? Call 911 anytime you think you may need emergency care. For example, call if:    · You passed out (lost consciousness).     · You have chest pain, are short of breath, or cough up blood. Call your doctor now or seek immediate medical care if:    · You are sick to your stomach or cannot drink fluids.     · You have pain that does not get better after you take pain medicine.     · You cannot pass stools or gas.     · You have signs of a blood clot in your leg (called a deep vein thrombosis), such as:  ? Pain in your calf, back of the knee, thigh, or groin. ? Redness or swelling in your leg.     · You have signs of infection, such as:  ? Increased pain, swelling, warmth, or redness. ? Red streaks leading from the incision. ? Pus draining from the incision. ? A fever.     · You have loose stitches, or your incision comes open.     · Bright red blood has soaked through the bandage over your incision. Watch closely for changes in your health, and be sure to contact your doctor if:    · You have any problems.     · You have new or worse swelling or pain in your arm. Where can you learn more? Go to http://www.gray.com/  Enter O045 in the search box to learn more about \"Open Breast Biopsy: What to Expect at Home. \"  Current as of: December 17, 2020               Content Version: 13.0  © 2006-2021 [employer]. Care instructions adapted under license by [employer] (which disclaims liability or warranty for this information). If you have questions about a medical condition or this instruction, always ask your healthcare professional. [place] [address] any warranty or liability for your use of this information.     DISCHARGE SUMMARY from Nurse    PATIENT INSTRUCTIONS:    After general anesthesia or intravenous sedation, for 24 hours or while taking prescription Narcotics:  · Limit your activities  · Do not drive and operate hazardous machinery  · Do not make important personal or business decisions  · Do  not drink alcoholic beverages  · If you have not urinated within 8 hours after discharge, please contact your surgeon on call. Report the following to your surgeon:  · Excessive pain, swelling, redness or odor of or around the surgical area  · Temperature over 100.5  · Nausea and vomiting lasting longer than 4 hours or if unable to take medications  · Any signs of decreased circulation or nerve impairment to extremity: change in color, persistent  numbness, tingling, coldness or increase pain  · Any questions    What to do at Home:    *  Please give a list of your current medications to your Primary Care Provider. *  Please update this list whenever your medications are discontinued, doses are      changed, or new medications (including over-the-counter products) are added. *  Please carry medication information at all times in case of emergency situations. These are general instructions for a healthy lifestyle:    No smoking/ No tobacco products/ Avoid exposure to second hand smoke  Surgeon General's Warning:  Quitting smoking now greatly reduces serious risk to your health. Obesity, smoking, and sedentary lifestyle greatly increases your risk for illness    A healthy diet, regular physical exercise & weight monitoring are important for maintaining a healthy lifestyle    You may be retaining fluid if you have a history of heart failure or if you experience any of the following symptoms:  Weight gain of 3 pounds or more overnight or 5 pounds in a week, increased swelling in our hands or feet or shortness of breath while lying flat in bed.   Please call your doctor as soon as you notice any of these symptoms; do not wait until your next office visit.        The discharge information has been reviewed with the patient. The patient verbalized understanding. Discharge medications reviewed with the patient and appropriate educational materials and side effects teaching were provided.   ___________________________________________________________________________________________________________________________________

## 2021-11-29 ENCOUNTER — TELEPHONE (OUTPATIENT)
Dept: SURGERY | Age: 66
End: 2021-11-29

## 2021-11-29 NOTE — TELEPHONE ENCOUNTER
Attempted to reach patient Amanda Rai with pathology results per Dr. Karley Crawford. There was no answer, so a non urgent voicemail was left for Amanda Rai requesting a return call to the office at [phone].     ----- Message from Daniel Licona MD sent at 11/29/2021  9:13 AM EST -----  Please let her know there was no evidence of  cancer. We will discuss the results in detail at her follow up appointment.   Thank you    ----- Message -----  From: [facility] Results  Sent: 11/26/2021  10:54 AM EST  To: Daniel Licona MD

## 2021-11-29 NOTE — TELEPHONE ENCOUNTER
Patient returned Call. Notified Atlee Bodily 1955 per Dr. Estee Beltran to let her know that there was no evidence of cancer in pathology specimen. Dr. Etsee Beltran will review pathology report in detail at follow up appointment. Patient verbalized understanding. Confirmed upcoming appointment with patient.     ----- Message from Susan Molina MD sent at 11/29/2021  9:13 AM EST -----  Please let her know there was no evidence of  cancer. We will discuss the results in detail at her follow up appointment.   Thank you    ----- Message -----  From: [facility] Results  Sent: 11/26/2021  10:54 AM EST  To: Susan Molina MD

## 2021-12-10 NOTE — PROGRESS NOTES
Nipple Discharge     Ms. 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 She had been referred for right nipple discharge. She initially noticed the discharge around May 2021. She reports the discharge is clear and non bloody. It is spontaneous. She reports associated breast pain. She denied history of similar symptoms previously. She is not currently nursing. Imaging has been performed and was without evidence of malignancy. There is no family history of breast cancer      Breast/GYN history:    OB History    No obstetric history on file.           Past Medical History:   Diagnosis Date    Anxiety disorder     Arthritis     Cervical dystonia 9/5/2014    Cervical pain     Cervicogenic headache 9/5/2014    Chronic pain     knee    COVID-19 12/2020    DDD (degenerative disc disease), cervical 9/5/2014    Essential hypertension     Fibrillation, atrial (HCC)     Fibromyalgia     GERD (gastroesophageal reflux disease)     \"malfunctioning esophagus\"    Headache(784.0)     Hepatic steatosis     Herniated disc, cervical     Hypercholesterolemia     Hypertension     Joint pain     JRA (juvenile rheumatoid arthritis) ([address]     Menopause     Muscle pain     Musculoskeletal pain 9/5/2014    Non-intractable vomiting with nausea 6/4/2019    Numbness and tingling of left arm and leg     Status post cervical spinal fusion 9/5/2014    Thyroid disease     Vision decreased        Past Surgical History:   Procedure Laterality Date    COLONOSCOPY N/A 4/18/2017    COLONOSCOPY, DIAGNOSTIC performed by Larisa Rosales MD at [facility] HX BREAST BIOPSY Right 11/23/2021    RIGHT RETROAREOLAR EXCISIONAL BIOPSY performed by Radha Tidwell MD at [facility]  5/21/13    C3-4 ACDF     HX COLONOSCOPY      HX GASTRIC BYPASS  02/03/10    HX LUMBAR LAMINECTOMY      HX ORTHOPAEDIC  2013    Spinal SurgeryC-2 AND C-3    HX OTHER SURGICAL GIST tumor resected    HX OTHER SURGICAL      teeth extractions    HX PARTIAL HYSTERECTOMY      MA EXCISION TUMOR SOFT TISS FACE/SCALP SUBQ < 2CM N/A 2016    Dr. Zahira Sanchez       Current Outpatient Medications on File Prior to Visit   Medication Sig Dispense Refill    oxyCODONE-acetaminophen (Percocet)  mg per tablet Take 1 Tablet by mouth every six (6) hours as needed for Pain.  traZODone (DESYREL) 50 mg tablet take 1 tablet by mouth at bedtime if needed 90 Tablet 0    losartan (COZAAR) 25 mg tablet Take 1 Tablet by mouth daily. 90 Tablet 1    hydroCHLOROthiazide (HYDRODIURIL) 25 mg tablet take 1 tablet by mouth once daily (Patient taking differently: Takes PRN fluid build up) 90 Tab 1    cholecalciferol (VITAMIN D3) (50,000 UNITS /1250 MCG) capsule Take 1 Cap by mouth every seven (7) days. Indications: low vitamin D levels 12 Cap 0    levothyroxine (SYNTHROID) 75 mcg tablet take 1 tablet by mouth once daily (BEFORE BREAKFAST) 90 Tab 1    tiZANidine (ZANAFLEX) 4 mg tablet as needed.  omeprazole (PRILOSEC) 40 mg capsule Take 1 Cap by mouth two (2) times a day. (Patient taking differently: Take 40 mg by mouth as needed.) 60 Cap 5    naloxone (NARCAN) 1 mg/mL injection 1 mL by IntraMUSCular route once as needed for up to 1 dose. 1 Syringe 0    CYANOCOBALAMIN, VITAMIN B-12, (VITAMIN B-12 PO) Take 1 tablet by mouth as needed. No current facility-administered medications on file prior to visit.        Allergies   Allergen Reactions    Codeine Nausea and Vomiting    Pcn [Penicillins] Not Reported This Time and Hives    Tramadol Palpitations and Itching    Vicodin [Hydrocodone-Acetaminophen] Itching and Hives       Social History     Tobacco Use    Smoking status: Former Smoker     Packs/day: 1.00     Years: 5.00     Pack years: 5.00     Types: Cigarettes     Quit date: 1989     Years since quittin.9    Smokeless tobacco: Never Used   Vaping Use    Vaping Use: Never used   Substance Use Topics    Alcohol use: No    Drug use: Never     Types: Prescription, OTC       Family History   Problem Relation Age of Onset    Hypertension Mother     Heart Failure Mother     Hypertension Father     OSTEOARTHRITIS Father          ROS:   General: denies fevers, chills, night sweats, fatigue, weight loss, weight gain  GI: denies nausea, vomiting, abdominal pain, change in bowel habits, hematochezia, melena, GERD  Integ: denies dermatitis, abnormal moles  HEENT: denies visual changes, vertigo, epistaxis, dysphagia, hoarseness  Cardiac: denies chest pain, palpitations, HTN, edema, varicosities  Resp: denies cough, shortness of breath, wheezing, hemoptysis, snoring, reactive airway disease  : denies hematuria, dysuria, frequency, urgency, nocturia, stress urinary incontinence MSK: weakness, joint pain, arthritis  Endocrine: denies diabetes, thyroid disease, polyuria, polydipsia, polyphagia, poor wound healing, heat intolerance, cold intolerance  Lymph/Heme: denies anemia, bruising, history of blood transfusions  Neuro: denies dizziness, headache, fainting, seizures, stroke  Psych: denies anxiety, depression    Physical Exam:  Visit Vitals  /74 (BP 1 Location: Left arm, BP Patient Position: Sitting, BP Cuff Size: Adult)   Pulse 70   Temp 97.3 °F (36.3 °C) (Temporal)   Resp 16   Ht 5' 5\" (1.651 m)   Wt 69.4 kg (153 lb)   LMP 12/30/1988   SpO2 98%   BMI 25.46 kg/m²       Gen: No distress  Head: Normocephalic, atraumatic  Mouth: Clear, no overt lesions, oral mucosa pink and moist  Neck: Supple, no masses, no adenopathy, trachea midline  Resp: Clear bilaterally  Cardio: Regular rate and rhythm  Abdomen: soft, nontender, nondistended  Extremeties: warm, well-perfused  Neuro: sensation and strength grossly intact and symmetrical  Psych: alert and oriented to person, place and time  Breasts: palpation deferred, incision clean, previously  Right: Examined in both the supine and upright positions. There was no supraclavicular, infraclavicular, or axillary lympadenopathy. There were no dominant masses, no skin changes, no asymmetry identified tender, no drainage today  Left:  Examined in both the supine and upright positions. There was no supraclavicular, infraclavicular, or axillary lympadenopathy. There were no dominant masses, no skin changes, no asymmetry identified     Imagin21 bilateral mammogram/right ultrasound  No mammographic or sonographic evidence of malignancy. -Given reported history of spontaneous clear unilateral nipple discharge from  the right breast, recommend surgical consultation for further clinical  evaluation/management.        -Findings and recommendations were discussed with the patient. The patient met  with breast care coordinator prior to leaving the department.        BIRADS 1:  Negative  (PLEASE SEE DISCUSSION ABOVE WITH RECOMMENDATION FOR SURGICAL CONSULT). Pathology:  21  Right breast, excisional biopsy:        Duct ectasia.       Impression:  Patient Active Problem List   Diagnosis Code    S/P gastric bypass - date of surgery 02/10 Z98.84    Cervical disc disease with myelopathy M50.00    S/P cervical discectomy Z98.890    Status post cervical spinal arthrodesis Z98.1    Chronic pain syndrome G89.4    Postlaminectomy syndrome, cervical region M96.1    Myalgia and myositis, unspecified [id number]    Encounter for postoperative care Z48.89    Thyroid disease E07.9    Essential hypertension I10    Hypercholesterolemia E78.00    Musculoskeletal pain M79.18    DDD (degenerative disc disease), cervical M50.30    Status post cervical spinal fusion Z98.1    Cervicogenic headache G44.86    Cervical dystonia G24.3    Neck pain M54.2    Esophageal dysfunction K22.4    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    Supraventricular tachycardia (HCC) I47.1    Gastroesophageal reflux disease with esophagitis K21.00    Anxiety F41.9    Weakness generalized R53.1    Other chest pain R07.89    Generalized abdominal pain R10.84    Other headache syndrome G44.89    Non-intractable vomiting with nausea R11.2    Malaise R53.81    Atrial fibrillation with RVR (HCC) I48.91    Gastroesophageal reflux disease without esophagitis K21.9    Nausea R11.0    Frequent headaches R51.9    Age-related nuclear cataract, bilateral H25.13    Nipple discharge N64.52        77year old woman with right nipple discharge. The pathology was reviewed. Follow up 1 month. She was asked to call with questions or concerns.

## 2021-12-13 ENCOUNTER — OFFICE VISIT (OUTPATIENT)
Dept: SURGERY | Age: 66
End: 2021-12-13
Payer: MEDICARE

## 2021-12-13 VITALS
SYSTOLIC BLOOD PRESSURE: 130 MMHG | HEIGHT: 65 IN | WEIGHT: 153 LBS | RESPIRATION RATE: 16 BRPM | DIASTOLIC BLOOD PRESSURE: 74 MMHG | OXYGEN SATURATION: 98 % | TEMPERATURE: 97.3 F | BODY MASS INDEX: 25.49 KG/M2 | HEART RATE: 70 BPM

## 2021-12-13 DIAGNOSIS — N64.52 NIPPLE DISCHARGE: Primary | ICD-10-CM

## 2021-12-13 PROCEDURE — 99024 POSTOP FOLLOW-UP VISIT: CPT | Performed by: SURGERY

## 2021-12-13 RX ORDER — OXYCODONE AND ACETAMINOPHEN 10; 325 MG/1; MG/1
1 TABLET ORAL
COMMUNITY
End: 2022-03-08 | Stop reason: SDUPTHER

## 2021-12-13 NOTE — LETTER
12/13/2021 11:31 AM    Patient:  Amanuel Shah   YOB: 1955  Date of Visit: 12/13/2021      Leti Meyer MD  [id number]  [phone]  [facility]    Dear Leti Meyer MD,      I had the pleasure of seeing Ms. Obdulia Edgar in my office today for her nipple discharge. I am including a copy of my office visit today. If you have questions, please do not hesitate to call me. I look forward to following Ms. Tayler Roque along with you and will keep you updated as to her progress.            Sincerely,      Jax Licona MD

## 2021-12-13 NOTE — PROGRESS NOTES
Monie Ming is a 77 y.o. female  Chief Complaint   Patient presents with    Post OP Follow Up     RIGHT RETROAREOLAR EXCISIONAL BIOPSY

## 2022-01-07 NOTE — PROGRESS NOTES
Nipple Discharge     Ms. 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 She had been referred for right nipple discharge. She initially noticed the discharge around May 2021. She reports the discharge is clear and non bloody. It is spontaneous. She reports associated breast pain. She denied history of similar symptoms previously. She is not currently nursing. Imaging has been performed and was without evidence of malignancy. There is no family history of breast cancer. She notes continued intermittent pain and yellow discharge. Breast/GYN history:    OB History    No obstetric history on file.           Past Medical History:   Diagnosis Date    Anxiety disorder     Arthritis     Cervical dystonia 9/5/2014    Cervical pain     Cervicogenic headache 9/5/2014    Chronic pain     knee    COVID-19 12/2020    DDD (degenerative disc disease), cervical 9/5/2014    Essential hypertension     Fibrillation, atrial (HCC)     denies    Fibromyalgia     GERD (gastroesophageal reflux disease)     \"malfunctioning esophagus\"    Headache(784.0)     Hepatic steatosis     Herniated disc, cervical     Hypercholesterolemia     no longer    Hypertension     Joint pain     JRA (juvenile rheumatoid arthritis) ([facility] [address]     Menopause     Muscle pain     Musculoskeletal pain 9/5/2014    Non-intractable vomiting with nausea 6/4/2019    Numbness and tingling of left arm and leg     Status post cervical spinal fusion 9/5/2014    Thyroid disease     Vision decreased        Past Surgical History:   Procedure Laterality Date    COLONOSCOPY N/A 4/18/2017    COLONOSCOPY, DIAGNOSTIC performed by Steven Gregorio MD at [facility] HX BREAST BIOPSY Right 11/23/2021    RIGHT RETROAREOLAR EXCISIONAL BIOPSY performed by Garth Terrell MD at [address] [facility]  5/21/13    C3-4 ACDF     HX COLONOSCOPY      HX GASTRIC BYPASS  02/03/10    HX HEENT cataract right and left    HX LUMBAR LAMINECTOMY      HX ORTHOPAEDIC  2013    Spinal SurgeryC-2 AND C-3    HX OTHER SURGICAL      GIST tumor resected    HX OTHER SURGICAL      teeth extractions    HX OTHER SURGICAL      thyroidectomy    HX PARTIAL HYSTERECTOMY  1988    IN EXCISION TUMOR SOFT TISS FACE/SCALP SUBQ < 2CM N/A 09/30/2016    Dr. Kishore Funez       Current Outpatient Medications on File Prior to Visit   Medication Sig Dispense Refill    traZODone (DESYREL) 50 mg tablet take 1 tablet by mouth at bedtime if needed 90 Tablet 0    oxyCODONE-acetaminophen (Percocet)  mg per tablet Take 1 Tablet by mouth every six (6) hours as needed for Pain.  losartan (COZAAR) 25 mg tablet Take 1 Tablet by mouth daily. 90 Tablet 1    hydroCHLOROthiazide (HYDRODIURIL) 25 mg tablet take 1 tablet by mouth once daily (Patient taking differently: Takes PRN fluid build up) 90 Tab 1    cholecalciferol (VITAMIN D3) (50,000 UNITS /1250 MCG) capsule Take 1 Cap by mouth every seven (7) days. Indications: low vitamin D levels 12 Cap 0    levothyroxine (SYNTHROID) 75 mcg tablet take 1 tablet by mouth once daily (BEFORE BREAKFAST) 90 Tab 1    tiZANidine (ZANAFLEX) 4 mg tablet as needed.  omeprazole (PRILOSEC) 40 mg capsule Take 1 Cap by mouth two (2) times a day. (Patient taking differently: Take 40 mg by mouth as needed.) 60 Cap 5    naloxone (NARCAN) 1 mg/mL injection 1 mL by IntraMUSCular route once as needed for up to 1 dose. 1 Syringe 0    CYANOCOBALAMIN, VITAMIN B-12, (VITAMIN B-12 PO) Take 1 tablet by mouth as needed. No current facility-administered medications on file prior to visit.        Allergies   Allergen Reactions    Codeine Nausea and Vomiting    Pcn [Penicillins] Not Reported This Time and Hives    Tramadol Palpitations and Itching    Vicodin [Hydrocodone-Acetaminophen] Itching and Hives       Social History     Tobacco Use    Smoking status: Former Smoker     Packs/day: 1.00     Years: 5.00     Pack years: 5.00     Types: Cigarettes     Quit date: 1989     Years since quittin.0    Smokeless tobacco: Never Used   Vaping Use    Vaping Use: Never used   Substance Use Topics    Alcohol use: No     Comment: rare    Drug use: Never     Types: Prescription, OTC       Family History   Problem Relation Age of Onset    Hypertension Mother     Heart Failure Mother     Hypertension Father     OSTEOARTHRITIS Father          ROS:   General: denies fevers, chills, night sweats, fatigue, weight loss, weight gain  GI: denies nausea, vomiting, abdominal pain, change in bowel habits, hematochezia, melena, GERD  Integ: denies dermatitis, abnormal moles  HEENT: denies visual changes, vertigo, epistaxis, dysphagia, hoarseness  Cardiac: denies chest pain, palpitations, HTN, edema, varicosities  Resp: denies cough, shortness of breath, wheezing, hemoptysis, snoring, reactive airway disease  : denies hematuria, dysuria, frequency, urgency, nocturia, stress urinary incontinence MSK: weakness, joint pain, arthritis  Endocrine: denies diabetes, thyroid disease, polyuria, polydipsia, polyphagia, poor wound healing, heat intolerance, cold intolerance  Lymph/Heme: denies anemia, bruising, history of blood transfusions  Neuro: denies dizziness, headache, fainting, seizures, stroke  Psych: denies anxiety, depression    Physical Exam:  Visit Vitals  /66   Pulse 82   Temp 97.7 °F (36.5 °C)   Resp 18   Ht 5' 5\" (1.651 m)   Wt 69.9 kg (154 lb)   LMP 1988   SpO2 98%   BMI 25.63 kg/m²       Gen: No distress  Head: Normocephalic, atraumatic  Mouth: Clear, no overt lesions, oral mucosa pink and moist  Neck: Supple, no masses, no adenopathy, trachea midline  Resp: Clear bilaterally  Cardio: Regular rate and rhythm  Abdomen: soft, nontender, nondistended  Extremeties: warm, well-perfused  Neuro: sensation and strength grossly intact and symmetrical  Psych: alert and oriented to person, place and time  Breasts: palpation deferred, incision clean, previously  Right: Examined in both the supine and upright positions. There was no supraclavicular, infraclavicular, or axillary lympadenopathy. There were no dominant masses, no skin changes, no asymmetry identified tender, no drainage today  Left:  Examined in both the supine and upright positions. There was no supraclavicular, infraclavicular, or axillary lympadenopathy. There were no dominant masses, no skin changes, no asymmetry identified     Imagin21 bilateral mammogram/right ultrasound  No mammographic or sonographic evidence of malignancy. -Given reported history of spontaneous clear unilateral nipple discharge from  the right breast, recommend surgical consultation for further clinical  evaluation/management.        -Findings and recommendations were discussed with the patient. The patient met  with breast care coordinator prior to leaving the department.        BIRADS 1:  Negative  (PLEASE SEE DISCUSSION ABOVE WITH RECOMMENDATION FOR SURGICAL CONSULT). Pathology:  21  Right breast, excisional biopsy:        Duct ectasia.       Impression:  Patient Active Problem List   Diagnosis Code    S/P gastric bypass - date of surgery 02/10 Z98.84    Cervical disc disease with myelopathy M50.00    S/P cervical discectomy Z98.890    Status post cervical spinal arthrodesis Z98.1    Chronic pain syndrome G89.4    Postlaminectomy syndrome, cervical region M96.1    Myalgia and myositis, unspecified [id number]    Encounter for postoperative care Z48.89    Thyroid disease E07.9    Essential hypertension I10    Hypercholesterolemia E78.00    Musculoskeletal pain M79.18    DDD (degenerative disc disease), cervical M50.30    Status post cervical spinal fusion Z98.1    Cervicogenic headache G44.86    Cervical dystonia G24.3    Neck pain M54.2    Esophageal dysfunction K22.4    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    Supraventricular tachycardia (HCC) I47.1    Gastroesophageal reflux disease with esophagitis K21.00    Anxiety F41.9    Weakness generalized R53.1    Other chest pain R07.89    Generalized abdominal pain R10.84    Other headache syndrome G44.89    Non-intractable vomiting with nausea R11.2    Malaise R53.81    Atrial fibrillation with RVR (HCC) I48.91    Gastroesophageal reflux disease without esophagitis K21.9    Nausea R11.0    Frequent headaches R51.9    Age-related nuclear cataract, bilateral H25.13    Nipple discharge       77year old woman with right duct ectasia, 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 The pathology was reviewed. She is due for bilateral mammogram May 2022. Follow up after imaging. She was asked to call with questions or concerns.

## 2022-01-09 DIAGNOSIS — G47.00 INSOMNIA, UNSPECIFIED TYPE: ICD-10-CM

## 2022-01-10 RX ORDER — TRAZODONE HYDROCHLORIDE 50 MG/1
TABLET ORAL
Qty: 90 TABLET | Refills: 0 | Status: SHIPPED | OUTPATIENT
Start: 2022-01-10 | End: 2022-04-10

## 2022-01-12 ENCOUNTER — OFFICE VISIT (OUTPATIENT)
Dept: SURGERY | Age: 67
End: 2022-01-12
Payer: MEDICARE

## 2022-01-12 VITALS
OXYGEN SATURATION: 98 % | BODY MASS INDEX: 25.66 KG/M2 | RESPIRATION RATE: 18 BRPM | HEIGHT: 65 IN | TEMPERATURE: 97.7 F | SYSTOLIC BLOOD PRESSURE: 130 MMHG | WEIGHT: 154 LBS | DIASTOLIC BLOOD PRESSURE: 66 MMHG | HEART RATE: 82 BPM

## 2022-01-12 DIAGNOSIS — N64.52 NIPPLE DISCHARGE: Primary | ICD-10-CM

## 2022-01-12 PROCEDURE — 99024 POSTOP FOLLOW-UP VISIT: CPT | Performed by: SURGERY

## 2022-01-12 NOTE — PROGRESS NOTES
Chief Complaint   Patient presents with    Breast Problem     right duct estasia exc 11/2021 right drainage    1. Have you been to the ER, urgent care clinic since your last visit? Hospitalized since your last visit? No    2. Have you seen or consulted any other health care providers outside of the [address] [place] [address] since your last visit? Include any pap smears or colon screening.  Yes ortho

## 2022-01-12 NOTE — LETTER
1/12/2022 10:11 AM    Patient:  Carl Hilario   YOB: 1955  Date of Visit: 1/12/2022      Asha Lyle MD  [id number] [phone]  [facility]    Dear Asha Lyle MD,      I had the pleasure of seeing Ms. Aubrey Coles in my office today for nipple discharge. I am including a copy of my office visit today. If you have questions, please do not hesitate to call me. I look forward to following Ms. Thierry Red along with you and will keep you updated as to her progress.            Sincerely,      Katie Griggs MD

## 2022-01-17 NOTE — PROGRESS NOTES
Amber Reid presents today for No chief complaint on file. Is someone accompanying this pt? ***    Is the patient using any DME equipment during OV? ***    Depression Screening:  3 most recent PHQ Screens 4/27/2021   Little interest or pleasure in doing things Not at all   Feeling down, depressed, irritable, or hopeless Not at all   Total Score PHQ 2 0       Learning Assessment:  Learning Assessment 2/22/2021   PRIMARY LEARNER Patient   HIGHEST LEVEL OF EDUCATION - PRIMARY LEARNER  SOME COLLEGE   BARRIERS PRIMARY LEARNER NONE   CO-LEARNER CAREGIVER No   PRIMARY LANGUAGE ENGLISH   LEARNER PREFERENCE PRIMARY DEMONSTRATION     LISTENING     -     -     -   ANSWERED BY patient    RELATIONSHIP SELF       Abuse Screening:  Abuse Screening Questionnaire 4/27/2021   Do you ever feel afraid of your partner? N   Are you in a relationship with someone who physically or mentally threatens you? N   Is it safe for you to go home? Y       Fall Screening  Fall Risk Assessment, last 12 mths 4/27/2021   Able to walk? Yes   Fall in past 12 months? 0   Do you feel unsteady? 0   Are you worried about falling 0       Generalized Anxiety  No flowsheet data found. Health Maintenance Due   Topic Date Due    DTaP/Tdap/Td series (1 - Tdap) Never done    Pneumococcal 65+ years (2 of 2 - PPSV23) 02/04/2021    Flu Vaccine (1) 09/01/2021    COVID-19 Vaccine (3 - Booster for Moderna series) 09/15/2021    Medicare Yearly Exam  12/30/2021   . Health Maintenance reviewed and discussed and ordered per Provider. Vaccines Due   Pneumococcal   Flu   covid    Tdap     Screenings Due     Exams/testing   Medicare Yearly exam     Amber Reid is updated on all HM    1. \"Have you been to the ER, urgent care clinic since your last visit? Hospitalized since your last visit? \" {Yes when where and reason for visit:20441}    2.  \"Have you seen or consulted any other health care providers outside of the [address] [place] [address] since your last visit? \" {Yes when where and reason for visit:20441}     3. For patients aged 39-70: Has the patient had a colonoscopy / FIT/ Cologuard? {yes when and where or no:33865}     If the patient is female:    4. For patients aged 41-77: Has the patient had a mammogram within the past 2 years? {yes when and where or no:07509}    5. For patients aged 21-65: Has the patient had a pap smear?  {yes when and where or no:19104}

## 2022-01-18 ENCOUNTER — OFFICE VISIT (OUTPATIENT)
Dept: FAMILY MEDICINE CLINIC | Age: 67
End: 2022-01-18
Payer: MEDICARE

## 2022-01-18 VITALS
HEIGHT: 65 IN | RESPIRATION RATE: 17 BRPM | DIASTOLIC BLOOD PRESSURE: 88 MMHG | SYSTOLIC BLOOD PRESSURE: 157 MMHG | OXYGEN SATURATION: 100 % | TEMPERATURE: 98.2 F | WEIGHT: 156 LBS | HEART RATE: 72 BPM | BODY MASS INDEX: 25.99 KG/M2

## 2022-01-18 DIAGNOSIS — G89.4 CHRONIC PAIN SYNDROME: ICD-10-CM

## 2022-01-18 DIAGNOSIS — I47.1 SUPRAVENTRICULAR TACHYCARDIA (HCC): ICD-10-CM

## 2022-01-18 DIAGNOSIS — I10 ESSENTIAL HYPERTENSION: Primary | ICD-10-CM

## 2022-01-18 DIAGNOSIS — G47.00 INSOMNIA, UNSPECIFIED TYPE: ICD-10-CM

## 2022-01-18 DIAGNOSIS — E55.9 VITAMIN D DEFICIENCY: ICD-10-CM

## 2022-01-18 PROCEDURE — G8427 DOCREV CUR MEDS BY ELIG CLIN: HCPCS | Performed by: FAMILY MEDICINE

## 2022-01-18 PROCEDURE — 99214 OFFICE O/P EST MOD 30 MIN: CPT | Performed by: FAMILY MEDICINE

## 2022-01-18 PROCEDURE — G8399 PT W/DXA RESULTS DOCUMENT: HCPCS | Performed by: FAMILY MEDICINE

## 2022-01-18 PROCEDURE — 1101F PT FALLS ASSESS-DOCD LE1/YR: CPT | Performed by: FAMILY MEDICINE

## 2022-01-18 PROCEDURE — G8419 CALC BMI OUT NRM PARAM NOF/U: HCPCS | Performed by: FAMILY MEDICINE

## 2022-01-18 PROCEDURE — G9899 SCRN MAM PERF RSLTS DOC: HCPCS | Performed by: FAMILY MEDICINE

## 2022-01-18 PROCEDURE — G8754 DIAS BP LESS 90: HCPCS | Performed by: FAMILY MEDICINE

## 2022-01-18 PROCEDURE — G8536 NO DOC ELDER MAL SCRN: HCPCS | Performed by: FAMILY MEDICINE

## 2022-01-18 PROCEDURE — 1090F PRES/ABSN URINE INCON ASSESS: CPT | Performed by: FAMILY MEDICINE

## 2022-01-18 PROCEDURE — 3017F COLORECTAL CA SCREEN DOC REV: CPT | Performed by: FAMILY MEDICINE

## 2022-01-18 PROCEDURE — G8752 SYS BP LESS 140: HCPCS | Performed by: FAMILY MEDICINE

## 2022-01-18 PROCEDURE — G8432 DEP SCR NOT DOC, RNG: HCPCS | Performed by: FAMILY MEDICINE

## 2022-01-18 RX ORDER — HYDROCHLOROTHIAZIDE 25 MG/1
TABLET ORAL
Qty: 90 TABLET | Refills: 1 | Status: SHIPPED | OUTPATIENT
Start: 2022-01-18 | End: 2022-01-18 | Stop reason: SDUPTHER

## 2022-01-18 RX ORDER — HYDROCHLOROTHIAZIDE 25 MG/1
TABLET ORAL
Qty: 90 TABLET | Refills: 1 | Status: SHIPPED | OUTPATIENT
Start: 2022-01-18

## 2022-01-18 NOTE — PROGRESS NOTES
Carlos Manuel Ward presents today for   Chief Complaint   Patient presents with    Follow-up    Hypertension       Is someone accompanying this pt? No     Is the patient using any DME equipment during OV? No     Depression Screening:  3 most recent PHQ Screens 4/27/2021   Little interest or pleasure in doing things Not at all   Feeling down, depressed, irritable, or hopeless Not at all   Total Score PHQ 2 0       Learning Assessment:  Learning Assessment 2/22/2021   PRIMARY LEARNER Patient   HIGHEST LEVEL OF EDUCATION - PRIMARY LEARNER  SOME COLLEGE   BARRIERS PRIMARY LEARNER NONE   CO-LEARNER CAREGIVER No   PRIMARY LANGUAGE ENGLISH   LEARNER PREFERENCE PRIMARY DEMONSTRATION     LISTENING     -     -     -   ANSWERED BY patient    RELATIONSHIP SELF       Fall Risk  Fall Risk Assessment, last 12 mths 4/27/2021   Able to walk? Yes   Fall in past 12 months? 0   Do you feel unsteady? 0   Are you worried about falling 0       ADL  No flowsheet data found. Travel Screening:    Travel Screening     No screening recorded since 01/17/22 0000     Travel History   Travel since 12/18/21    No documented travel since 12/18/21         Health Maintenance reviewed and discussed and ordered per Provider. Health Maintenance Due   Topic Date Due    DTaP/Tdap/Td series (1 - Tdap) Never done    Pneumococcal 65+ years (2 of 2 - PPSV23) 02/04/2021    Flu Vaccine (1) 09/01/2021    COVID-19 Vaccine (3 - Booster for Moderna series) 09/15/2021    Medicare Yearly Exam  12/30/2021   . Coordination of Care:  1. Have you been to the ER, urgent care clinic since your last visit? Hospitalized since your last visit? No     2. Have you seen or consulted any other health care providers outside of the [address] [place] [address] since your last visit? Include any pap smears or colon screening.  No

## 2022-01-18 NOTE — PROGRESS NOTES
Chief Complaint   Patient presents with    Follow-up    Hypertension         HPI    Jillian Santana is a 77 y.o. female presenting today for    3 months  follow up of htn, vit d def'cy, insomnia. Patient had labs on 11/18/21. Labs reviewed in detail with patient     Pt saw ortho for knee pain and had injections. Patient does need medication refills today. She was not taking hctz daily and needs a refill. She feels that she is retaining fluid at this time. New concerns today: pt needs a referral to a new pain management due to changing insurance. Review of Systems   Constitutional: Negative. HENT: Negative. Respiratory: Negative. Cardiovascular: Negative. All other systems reviewed and are negative. Physical Exam  Vitals and nursing note reviewed. Constitutional:       Appearance: Normal appearance. She is not ill-appearing. HENT:      Head: Normocephalic and atraumatic. Right Ear: External ear normal.      Left Ear: External ear normal.      Nose: Nose normal.      Mouth/Throat:      Mouth: Mucous membranes are moist.   Eyes:      Extraocular Movements: Extraocular movements intact. Conjunctiva/sclera: Conjunctivae normal.   Cardiovascular:      Rate and Rhythm: Normal rate and regular rhythm. Heart sounds: No murmur heard. No friction rub. No gallop. Pulmonary:      Effort: Pulmonary effort is normal.      Breath sounds: Normal breath sounds. No wheezing, rhonchi or rales. Musculoskeletal:         General: Normal range of motion. Cervical back: Normal range of motion. Skin:     General: Skin is warm and dry. Neurological:      Mental Status: She is alert and oriented to person, place, and time. Coordination: Coordination normal.   Psychiatric:         Mood and Affect: Mood normal.         Behavior: Behavior normal.         Thought Content:  Thought content normal.         Judgment: Judgment normal.         Diagnoses and all orders for this visit:    1. Essential hypertension  -     hydroCHLOROthiazide (HYDRODIURIL) 25 mg tablet; take 1 tablet by mouth once daily    2. Supraventricular tachycardia ([address]  Assessment & Plan:   monitored by specialist. No acute findings meriting change in the plan      3. Insomnia, unspecified type  Stable, cont pres tx plan. 4. Vitamin D deficiency  Cont vit d supplement    5. Chronic pain syndrome  -     REFERRAL TO PAIN MANAGEMENT    Follow-up and Dispositions    · Return in about 3 months (around 4/18/2022) for high blood pressure, vit D deficiency, insomnia.

## 2022-03-07 ENCOUNTER — HOSPITAL ENCOUNTER (OUTPATIENT)
Dept: LAB | Age: 67
Discharge: HOME | End: 2022-03-07
Payer: MEDICARE

## 2022-03-07 ENCOUNTER — OFFICE VISIT (OUTPATIENT)
Dept: FAMILY MEDICINE CLINIC | Age: 67
End: 2022-03-07
Payer: MEDICARE

## 2022-03-07 DIAGNOSIS — Z51.81 MEDICATION MONITORING ENCOUNTER: ICD-10-CM

## 2022-03-07 DIAGNOSIS — R11.0 NAUSEA: ICD-10-CM

## 2022-03-07 DIAGNOSIS — Z23 NEEDS FLU SHOT: ICD-10-CM

## 2022-03-07 DIAGNOSIS — G89.4 CHRONIC PAIN SYNDROME: Primary | ICD-10-CM

## 2022-03-07 DIAGNOSIS — Z23 ENCOUNTER FOR IMMUNIZATION: ICD-10-CM

## 2022-03-07 PROCEDURE — G0008 ADMIN INFLUENZA VIRUS VAC: HCPCS | Performed by: FAMILY MEDICINE

## 2022-03-07 PROCEDURE — 80307 DRUG TEST PRSMV CHEM ANLYZR: CPT

## 2022-03-07 PROCEDURE — 90732 PPSV23 VACC 2 YRS+ SUBQ/IM: CPT | Performed by: FAMILY MEDICINE

## 2022-03-07 PROCEDURE — G0009 ADMIN PNEUMOCOCCAL VACCINE: HCPCS | Performed by: FAMILY MEDICINE

## 2022-03-07 PROCEDURE — 99214 OFFICE O/P EST MOD 30 MIN: CPT | Performed by: FAMILY MEDICINE

## 2022-03-07 PROCEDURE — G8427 DOCREV CUR MEDS BY ELIG CLIN: HCPCS | Performed by: FAMILY MEDICINE

## 2022-03-07 PROCEDURE — G8510 SCR DEP NEG, NO PLAN REQD: HCPCS | Performed by: FAMILY MEDICINE

## 2022-03-07 PROCEDURE — 1101F PT FALLS ASSESS-DOCD LE1/YR: CPT | Performed by: FAMILY MEDICINE

## 2022-03-07 PROCEDURE — G8756 NO BP MEASURE DOC: HCPCS | Performed by: FAMILY MEDICINE

## 2022-03-07 PROCEDURE — G8419 CALC BMI OUT NRM PARAM NOF/U: HCPCS | Performed by: FAMILY MEDICINE

## 2022-03-07 PROCEDURE — 3017F COLORECTAL CA SCREEN DOC REV: CPT | Performed by: FAMILY MEDICINE

## 2022-03-07 PROCEDURE — G8399 PT W/DXA RESULTS DOCUMENT: HCPCS | Performed by: FAMILY MEDICINE

## 2022-03-07 PROCEDURE — G8536 NO DOC ELDER MAL SCRN: HCPCS | Performed by: FAMILY MEDICINE

## 2022-03-07 PROCEDURE — 1090F PRES/ABSN URINE INCON ASSESS: CPT | Performed by: FAMILY MEDICINE

## 2022-03-07 PROCEDURE — 90694 VACC AIIV4 NO PRSRV 0.5ML IM: CPT | Performed by: FAMILY MEDICINE

## 2022-03-07 PROCEDURE — G9899 SCRN MAM PERF RSLTS DOC: HCPCS | Performed by: FAMILY MEDICINE

## 2022-03-07 RX ORDER — ONDANSETRON 4 MG/1
4 TABLET, FILM COATED ORAL
Qty: 20 TABLET | Refills: 0 | Status: SHIPPED | OUTPATIENT
Start: 2022-03-07

## 2022-03-07 RX ORDER — ONDANSETRON 4 MG/1
4 TABLET, FILM COATED ORAL
COMMUNITY
End: 2022-03-07 | Stop reason: SDUPTHER

## 2022-03-07 RX ORDER — OXYCODONE AND ACETAMINOPHEN 10; 325 MG/1; MG/1
1 TABLET ORAL
Status: CANCELLED | OUTPATIENT
Start: 2022-03-07

## 2022-03-07 NOTE — PROGRESS NOTES
Robert Whitaker presents today for   Chief Complaint   Patient presents with    Medication Refill      patient needs new referral to pain managment would like temporary supply until she can get in . Is someone accompanying this pt? No     Is the patient using any DME equipment during OV? No     Depression Screening:  3 most recent PHQ Screens 3/7/2022   Little interest or pleasure in doing things Not at all   Feeling down, depressed, irritable, or hopeless Not at all   Total Score PHQ 2 0       Learning Assessment:  Learning Assessment 2/22/2021   PRIMARY LEARNER Patient   HIGHEST LEVEL OF EDUCATION - PRIMARY LEARNER  SOME COLLEGE   BARRIERS PRIMARY LEARNER NONE   CO-LEARNER CAREGIVER No   PRIMARY LANGUAGE ENGLISH   LEARNER PREFERENCE PRIMARY DEMONSTRATION     LISTENING     -     -     -   ANSWERED BY patient    RELATIONSHIP SELF       Abuse Screening:  Abuse Screening Questionnaire 4/27/2021   Do you ever feel afraid of your partner? N   Are you in a relationship with someone who physically or mentally threatens you? N   Is it safe for you to go home? Y       Fall Screening  Fall Risk Assessment, last 12 mths 3/7/2022   Able to walk? Yes   Fall in past 12 months? 0   Do you feel unsteady? 0   Are you worried about falling 0       Generalized Anxiety  No flowsheet data found. Health Maintenance Due   Topic Date Due    DTaP/Tdap/Td series (1 - Tdap) Never done    Pneumococcal 65+ years (2 of 2 - PPSV23) 02/04/2021    Flu Vaccine (1) 09/01/2021    Medicare Yearly Exam  12/30/2021   . Health Maintenance reviewed and discussed and ordered per Provider. Vaccines Due   Screenings Due       Robert Whitaker is updated on all HM    1. \"Have you been to the ER, urgent care clinic since your last visit? Hospitalized since your last visit? \" No    2. \"Have you seen or consulted any other health care providers outside of the [address] [place] [address] since your last visit? \" No     3.  For patients aged 45-75: Has the patient had a colonoscopy / FIT/ Cologuard? No     If the patient is female:    4. For patients aged 41-77: Has the patient had a mammogram within the past 2 years? Yes - no Care Gap present    5. For patients aged 21-65: Has the patient had a pap smear?  NA - based on age

## 2022-03-07 NOTE — PROGRESS NOTES
Chief Complaint   Patient presents with    Medication Refill      patient needs new referral to pain managment would like temporary supply until she can get in . HPI    Vineet Liang is a 79 y.o. female presenting today for follow up of pain. Pt has decided to stop seeing her prior pain clinic as it was all the way in [place] and she no longer wants to have injections. She would like a referral to a new ofc but would like to get pain meds to bridge her to the first appt at the new ofc. She was last seen at the prior pain management ofc in early Feb 2022. She is out of medication now. Pt would like a flu and prevnar 23 today. Review of Systems   Constitutional: Negative. HENT: Negative. Respiratory: Negative. Cardiovascular: Negative. Musculoskeletal: Positive for back pain, joint pain, myalgias and neck pain. All other systems reviewed and are negative. Physical Exam  Vitals and nursing note reviewed. Constitutional:       Appearance: Normal appearance. She is not ill-appearing. HENT:      Head: Normocephalic and atraumatic. Right Ear: External ear normal.      Left Ear: External ear normal.      Nose: Nose normal.      Mouth/Throat:      Mouth: Mucous membranes are moist.   Eyes:      Extraocular Movements: Extraocular movements intact. Conjunctiva/sclera: Conjunctivae normal.   Cardiovascular:      Rate and Rhythm: Normal rate and regular rhythm. Heart sounds: No murmur heard. No friction rub. No gallop. Pulmonary:      Effort: Pulmonary effort is normal.      Breath sounds: Normal breath sounds. No wheezing, rhonchi or rales. Musculoskeletal:         General: Normal range of motion. Cervical back: Normal range of motion. Skin:     General: Skin is warm and dry. Neurological:      Mental Status: She is alert and oriented to person, place, and time.       Coordination: Coordination normal.   Psychiatric:         Mood and Affect: Mood normal.         Behavior: Behavior normal.         Thought Content: Thought content normal.         Judgment: Judgment normal.         Diagnoses and all orders for this visit:    1. Chronic pain syndrome  -     REFERRAL TO PAIN MANAGEMENT    2. Nausea  -     ondansetron hcl (Zofran) 4 mg tablet; Take 1 Tablet by mouth every eight (8) hours as needed for Nausea or Vomiting. 3. Medication monitoring encounter  -     COMPLIANCE DRUG SCREEN/PRESCRIPTION MONITORING; Future    4. Needs flu shot  -     FLU (FLUAD QUAD INFLUENZA VACCINE,QUAD,ADJUVANTED)    5. Encounter for immunization  -     PNEUMOCOCCAL POLYSACCHARIDE VACCINE, 23-VALENT, ADULT OR IMMUNOSUPPRESSED PT DOSE,      Follow-up and Dispositions    · Return in about 6 weeks (around 4/18/2022) for high blood pressure, specialist eval, chronic pain.

## 2022-03-08 DIAGNOSIS — G89.4 CHRONIC PAIN SYNDROME: Primary | ICD-10-CM

## 2022-03-08 NOTE — TELEPHONE ENCOUNTER
Patient requesting refill on this medication. Please advise. Requested Prescriptions     Pending Prescriptions Disp Refills    oxyCODONE-acetaminophen (Percocet)  mg per tablet       Sig: Take 1 Tablet by mouth every six (6) hours as needed for Pain. Max Daily Amount: 4 Tablets.

## 2022-03-10 ENCOUNTER — TELEPHONE (OUTPATIENT)
Dept: FAMILY MEDICINE CLINIC | Age: 67
End: 2022-03-10

## 2022-03-10 DIAGNOSIS — G89.4 CHRONIC PAIN SYNDROME: Primary | ICD-10-CM

## 2022-03-11 NOTE — TELEPHONE ENCOUNTER
Chief Complaint   Patient presents with   • Follow-up     f/u FM, OA, osteoporosis.        History of Present Illness    Mildred Barr is a 66 year old, female, follow up for hand pain from osteoarthritis in her feet, hands and has 7 week history for swelling in her .mpc, right shoulder joint(s) pain and stiffness, inability to abduct, toes. She has not been able to do yoga. She has bad flare of her Rheumatoid Arthritis.   She had reaction to prednisone and meloxicam her arthritis is from osteoarthritis in multiple sites.   She has mechanical knee pain from osteoarthritis   She has new osteoporosis and she has failed calictonin and she had GI intolerance with fosamax. She got sick for five days w zoledronic acid  She has history of pain in her toes, knees and hands and associated swelling, stiffness, numbness/tingling at times. In 90s when the symptoms started she was seen by rheumatology and was offered nsaids, her RF was negative.    She has pain all over with tender points fibromyalgia syndrome  She has also pain in her feet after cuboid left side.   Past Medical History  Past Medical History:   Diagnosis Date   • Bronchitis    • Fracture 2014    cuboid bone in left foot   • GERD (gastroesophageal reflux disease)    • High cholesterol    • Osteoarthritis of knee     bilateral   • Osteoporosis    • Otitis media    • Pneumonia    • Sinusitis, chronic    • Urinary tract infection         Past Surgical History  Past Surgical History:   Procedure Laterality Date   • Appendectomy     • Hysterectomy  1991    endometriosis   • Laminectomy     • Spinal fusion     • Tonsillectomy and adenoidectomy          Allergies  ALLERGIES:   Allergen Reactions   • Penicillins      Faint   • Aspirin    • Dust Other (See Comments)     Sinus congestion        Current Medications  Current Outpatient Prescriptions   Medication Sig   • amylase-lipase-protease 27,000-5,000-17,000 units (ZENPEP) 5000 units per capsule Take 3 capsules by  Results of UDS not yet available. mouth 3 times daily (with meals).   • Glucosamine Sulfate (RONNELL PO) Take by mouth daily.   • omeprazole (PRILOSEC) 20 MG capsule TAKE 1 CAPSULE BY MOUTH DAILY   • Multiple Vitamins-Minerals (MULTIVITAMIN PO)    • EPINEPHrine 0.3 MG/0.3ML Solution Auto-injector Inject 0.3 mLs into the muscle once as needed for Anaphylaxis.   • calcium-vitamin D (OSCAL) 250-125 MG-UNIT per tablet Take 1 tablet by mouth 2 times daily.   • saccharomyces boulardii (FLORASTOR) 250 MG capsule Take 250 mg by mouth daily.   • albuterol (VENTOLIN HFA) 108 (90 Base) MCG/ACT inhaler Inhale 2 puffs into the lungs every 4 hours as needed for Other (cough).   • Spacer/Aero-Holding Chambers (AEROCHAMBER) For use with MDI   • diclofenac (VOLTAREN) 1 % gel Apply to the QID over knee and mid foot.     No current facility-administered medications for this visit.        Family History  Family History   Problem Relation Age of Onset   • Heart Mother      CHF   • Heart disease Mother    • Other Father      AAA   • Heart disease Father    • High cholesterol Sister    • High blood pressure Brother    • High cholesterol Brother       Reviewed   Social History  Social History     Social History   • Marital status:      Spouse name: N/A   • Number of children: N/A   • Years of education: N/A     Occupational History   • Not on file.     Social History Main Topics   • Smoking status: Never Smoker   • Smokeless tobacco: Never Used   • Alcohol use 1.2 oz/week     2 Glasses of wine per week      Comment: occassionally   • Drug use: No   • Sexual activity: Yes     Partners: Male     Birth control/ protection: Post-menopausal      Comment:      Other Topics Concern   • Not on file     Social History Narrative   • No narrative on file        Review of Systems  GEN:  No Fever, wt loss, fatigue,   ENT: No new sudden hearing loss, recurrent severe sinusitis,  otitis media  EYES: No redness, or inflammation like iritis, scleritis etc  MOUTH: No oral sores,    NECK: No lymph node enlargement  CHEST: No cough, hemoptysis, pleuritic pain  HEART: No new murmurs, shortness of breath, edema  ABDOMEN: normal, No blood in stool, upper gi bleeding,  inflammatory bowel disease  HEMAPOIETIC:  No blood clots  NEURO: No stroke like symptoms, numbness and tingling,   MsK: as above  PSYCH: normal  SKIN: no nail pitting, psoriatic like rash, photosensitivity,     Physical Exam    Constitutional:  Vitals:    03/29/18 0948   BP: 106/70   Pulse: 62   Temp: 97 °F (36.1 °C)     General Appearance:  Well groomed, NAD  Musculoskeletal:   Gait is normal ,  Hands are inspected for deformity, swelling and moved for range of motion, strength and stability  distal interphalangeal joint and proximal interphalangeal joint bony enlargement, proximal interphalangeal joint 23 right s1t1, t1 on right,   Both wrists, elbows, and shoulders are inspected and palpated for swelling deformities and weakness and moved to assess range of motion, stability, strength and tone. The result are normal. right shoulder joint(s) pain,   The feet are inspected for deformity, swelling and moved for range of motion, strength and stability  metatarso-phalangeal joint tenderness  Both subtalar joints, tibiotalar joints, knees, and hip are inspected and palpated for swelling deformities and weakness and moved to assess range of motion, stability, strength and tone. .normal range of motion and no synovitis    Achilles:  normal  Planter Fascia: non tender  Tender points: none  Cervical spine The result are normal. No change in plan needed.  .   Lumbar Spine:   TJC:   SJC:   Muscle strength normal  in all 4 extremities.  Skin: no rashes, ulcerations of the skin.  Inspection and palpation of nails: no pitting, deformity   Eyes: pupils equal, normal conjunctiva  Neck: supple  Resp: normal and symmetric effort  CVS: no cyanosis, edema,   Neuro: nonfocal, no cranial nerve abnormalities,   Psych: normal mood, affect, judgement and  oriented.      Diagnostic Data:     Lab:   reviewed  Component      Latest Ref Rng & Units 1/10/2012 12/1/2016 3/1/2017   Cyclic Citrul Pep AB       <20 Units  4    TSH      0.350 - 5.000 mcUnits/mL   0.924   DNA AB,DS      <10 IUnits/mL  <1    SSA AB      0 - 0.9 AI  <0.2    CHROMATIN AB, IGG      0 - 0.9 AI  <0.2    RIBOSOMAL P PROTEIN      0 - 0.9 AI  <0.2    SSB AB      0 - 0.9 AI  <0.2    SM ANTIBODY      0 - 0.9 AI  <0.2    RNP ANTIBODY      0 - 0.9 AI  <0.2    CHIQUITA 1 AB      0 - 0.9 AI  <0.2    SCLERODERMA SCL 70 AB      0 - 0.9 AI  <0.2    ANTI CENTROMERE AB      0 - 0.9 AI  <0.2    SM/RNP AB IGG      0 - 0.9 AI  <0.2    KAITLIN      NEGATIVE NEGATIVE     RHEUMATOID FACTOR      <15 Units/mL  <10    KAITLIN WITHOUT REFLEX      NEGATIVE  NEGATIVE        Radiology Studies:   reviewed    Assessment  66 year old female with joint pain due osteoarthritis and flare of her Rheumatoid Arthritis     She had fever and joint pain for five days with Reclast was likely her Rheumatoid Arthritis   Flare   We will repeat reclast at one year    Recommendations  1. Seronegative rheumatoid arthritis (CMS/HCC)      2. Long-term use of high-risk medication    - CBC & AUTO DIFFERENTIAL; Standing  - HEPATIC FUNCTION PANEL; Standing  - CREATININE SERUM; Standing    3. Fibromyalgia syndrome      4. Osteoarthritis of both feet, unspecified osteoarthritis type      5. Primary osteoarthritis of both knees                  Thank you, Albertina Mckay MD , for allowing me to share in the care of this patient.

## 2022-03-17 LAB — DRUGS UR: NORMAL

## 2022-03-18 ENCOUNTER — TELEPHONE (OUTPATIENT)
Dept: FAMILY MEDICINE CLINIC | Age: 67
End: 2022-03-18

## 2022-03-18 PROBLEM — F41.9 ANXIETY: Status: ACTIVE | Noted: 2017-01-30

## 2022-03-18 PROBLEM — R10.84 GENERALIZED ABDOMINAL PAIN: Status: ACTIVE | Noted: 2019-06-04

## 2022-03-18 PROBLEM — K21.00 GASTROESOPHAGEAL REFLUX DISEASE WITH ESOPHAGITIS: Status: ACTIVE | Noted: 2017-01-30

## 2022-03-18 PROBLEM — H25.13 AGE-RELATED NUCLEAR CATARACT, BILATERAL: Status: ACTIVE | Noted: 2020-07-24

## 2022-03-18 PROBLEM — R53.1 WEAKNESS GENERALIZED: Status: ACTIVE | Noted: 2017-01-30

## 2022-03-18 PROBLEM — R07.89 OTHER CHEST PAIN: Status: ACTIVE | Noted: 2019-06-04

## 2022-03-18 NOTE — TELEPHONE ENCOUNTER
Patients UDS is finally resulted . Will send referral to [facility] after clarifying with patient which patient  Wants to see .

## 2022-03-18 NOTE — TELEPHONE ENCOUNTER
----- Message from Denise Bradley sent at 3/9/2022 12:44 PM EST -----  Subject: Referral Request    QUESTIONS   Reason for referral request? patient would like referral for pain   management at [facility] in [place]   Has the physician seen you for this condition before? No   Preferred Specialist (if applicable)? Do you already have an appointment scheduled? No  Additional Information for Provider?   ---------------------------------------------------------------------------  --------------  CALL BACK INFO  What is the best way for the office to contact you? OK to leave message on   voicemail  Preferred Call Back Phone Number?  [phone]

## 2022-03-19 PROBLEM — R11.0 NAUSEA: Status: ACTIVE | Noted: 2019-07-08

## 2022-03-19 PROBLEM — N64.52 NIPPLE DISCHARGE: Status: ACTIVE | Noted: 2021-10-27

## 2022-03-19 PROBLEM — K21.9 GASTROESOPHAGEAL REFLUX DISEASE WITHOUT ESOPHAGITIS: Status: ACTIVE | Noted: 2019-07-08

## 2022-03-19 PROBLEM — I47.10 SUPRAVENTRICULAR TACHYCARDIA (HCC): Status: ACTIVE | Noted: 2017-01-30

## 2022-03-19 PROBLEM — R53.81 MALAISE: Status: ACTIVE | Noted: 2019-06-04

## 2022-03-20 PROBLEM — G44.89 OTHER HEADACHE SYNDROME: Status: ACTIVE | Noted: 2019-06-04

## 2022-03-20 PROBLEM — R51.9 FREQUENT HEADACHES: Status: ACTIVE | Noted: 2019-07-08

## 2022-03-20 PROBLEM — R11.2 NON-INTRACTABLE VOMITING WITH NAUSEA: Status: ACTIVE | Noted: 2019-06-04

## 2022-03-22 ENCOUNTER — TELEPHONE (OUTPATIENT)
Dept: FAMILY MEDICINE CLINIC | Age: 67
End: 2022-03-22

## 2022-03-22 RX ORDER — OXYCODONE AND ACETAMINOPHEN 10; 325 MG/1; MG/1
1 TABLET ORAL
Qty: 60 TABLET | Refills: 0 | Status: SHIPPED | OUTPATIENT
Start: 2022-03-22 | End: 2022-04-21

## 2022-04-07 DIAGNOSIS — G47.00 INSOMNIA, UNSPECIFIED TYPE: ICD-10-CM

## 2022-04-10 RX ORDER — TRAZODONE HYDROCHLORIDE 50 MG/1
TABLET ORAL
Qty: 90 TABLET | Refills: 0 | Status: SHIPPED | OUTPATIENT
Start: 2022-04-10 | End: 2022-09-06

## 2022-09-04 DIAGNOSIS — G47.00 INSOMNIA, UNSPECIFIED TYPE: ICD-10-CM

## 2022-09-06 RX ORDER — TRAZODONE HYDROCHLORIDE 50 MG/1
TABLET ORAL
Qty: 90 TABLET | Refills: 0 | Status: SHIPPED | OUTPATIENT
Start: 2022-09-06

## 2024-08-14 ENCOUNTER — OFFICE VISIT (OUTPATIENT)
Facility: CLINIC | Age: 69
End: 2024-08-14

## 2024-08-14 VITALS
SYSTOLIC BLOOD PRESSURE: 135 MMHG | HEIGHT: 65 IN | RESPIRATION RATE: 16 BRPM | DIASTOLIC BLOOD PRESSURE: 65 MMHG | HEART RATE: 61 BPM | WEIGHT: 149.8 LBS | TEMPERATURE: 98 F | OXYGEN SATURATION: 99 % | BODY MASS INDEX: 24.96 KG/M2

## 2024-08-14 DIAGNOSIS — Z87.19 HX OF HEMORRHOIDS: ICD-10-CM

## 2024-08-14 DIAGNOSIS — R11.0 CHRONIC NAUSEA: ICD-10-CM

## 2024-08-14 DIAGNOSIS — Z13.1 SCREENING FOR DIABETES MELLITUS (DM): ICD-10-CM

## 2024-08-14 DIAGNOSIS — Z12.31 ENCOUNTER FOR SCREENING MAMMOGRAM FOR MALIGNANT NEOPLASM OF BREAST: Primary | ICD-10-CM

## 2024-08-14 DIAGNOSIS — Z12.11 SCREEN FOR COLON CANCER: ICD-10-CM

## 2024-08-14 DIAGNOSIS — Z11.4 SCREENING FOR HUMAN IMMUNODEFICIENCY VIRUS: ICD-10-CM

## 2024-08-14 DIAGNOSIS — Z13.29 THYROID DISORDER SCREENING: ICD-10-CM

## 2024-08-14 DIAGNOSIS — Z13.220 NEED FOR LIPID SCREENING: ICD-10-CM

## 2024-08-14 DIAGNOSIS — E03.9 HYPOTHYROIDISM, UNSPECIFIED TYPE: ICD-10-CM

## 2024-08-14 RX ORDER — ONDANSETRON 4 MG/1
4 TABLET, FILM COATED ORAL EVERY 8 HOURS PRN
Qty: 60 TABLET | Refills: 0 | Status: SHIPPED | OUTPATIENT
Start: 2024-08-14

## 2024-08-14 SDOH — ECONOMIC STABILITY: FOOD INSECURITY: WITHIN THE PAST 12 MONTHS, THE FOOD YOU BOUGHT JUST DIDN'T LAST AND YOU DIDN'T HAVE MONEY TO GET MORE.: NEVER TRUE

## 2024-08-14 SDOH — ECONOMIC STABILITY: INCOME INSECURITY: HOW HARD IS IT FOR YOU TO PAY FOR THE VERY BASICS LIKE FOOD, HOUSING, MEDICAL CARE, AND HEATING?: NOT HARD AT ALL

## 2024-08-14 SDOH — ECONOMIC STABILITY: FOOD INSECURITY: WITHIN THE PAST 12 MONTHS, YOU WORRIED THAT YOUR FOOD WOULD RUN OUT BEFORE YOU GOT MONEY TO BUY MORE.: NEVER TRUE

## 2024-08-14 ASSESSMENT — PATIENT HEALTH QUESTIONNAIRE - PHQ9
SUM OF ALL RESPONSES TO PHQ QUESTIONS 1-9: 0
SUM OF ALL RESPONSES TO PHQ9 QUESTIONS 1 & 2: 0
2. FEELING DOWN, DEPRESSED OR HOPELESS: NOT AT ALL
SUM OF ALL RESPONSES TO PHQ QUESTIONS 1-9: 0
1. LITTLE INTEREST OR PLEASURE IN DOING THINGS: NOT AT ALL

## 2024-08-14 NOTE — PROGRESS NOTES
\"Have you been to the ER, urgent care clinic since your last visit?  Hospitalized since your last visit?\"    NO    “Have you seen or consulted any other health care providers outside of [facility] since your last visit?”    NO    Have you had a mammogram?”   NO    Date of last Mammogram: 5/20/2021             Click Here for Release of Records Request    
continue to monitor.  DASH diet.    3.  Hypothyroid  Check TSH level.  And continue Synthroid 75 mcg as needed.    4.  Ordered screening labs.    5.  Follow-up in 1 month that she has multiple issues she may need to address.      Ángela Downs MD

## 2024-08-16 ENCOUNTER — TELEPHONE (OUTPATIENT)
Facility: CLINIC | Age: 69
End: 2024-08-16

## 2024-08-16 NOTE — TELEPHONE ENCOUNTER
Skye from [facility] Mammogram Dept called and stated they need an order for a bilateral diagnostic mammogram with a bilateral ultrasound. Pt came in there office complaining of pain in the left breast and left nipple discharge. The fax number is [phone]. For more information Skye can be reached at [phone]. Please advise. Thank you!!!

## 2024-08-20 ENCOUNTER — TELEPHONE (OUTPATIENT)
Facility: CLINIC | Age: 69
End: 2024-08-20

## 2024-08-20 NOTE — TELEPHONE ENCOUNTER
Skye from [facility] Mammogram Dept called and stated they need an order for bilateral diagnostic mammogram with bilateral ultrasound of breast. Because the pt is having breast issues. The fax number is [phone]

## 2024-08-23 DIAGNOSIS — R92.8 ABNORMAL MAMMOGRAM OF RIGHT BREAST: Primary | ICD-10-CM

## 2024-08-27 ENCOUNTER — TELEPHONE (OUTPATIENT)
Facility: CLINIC | Age: 69
End: 2024-08-27

## 2024-08-27 NOTE — TELEPHONE ENCOUNTER
Skye from [facility] Mammogram Dept called and stated they need an order for bilateral diagnostic mammogram with bilateral ultrasound of breast. Because the pt is having nipple discharge. The fax number is [phone]

## 2024-08-28 DIAGNOSIS — N64.52 NIPPLE DISCHARGE: Primary | ICD-10-CM

## 2024-09-13 PROBLEM — Z12.11 SCREEN FOR COLON CANCER: Status: RESOLVED | Noted: 2024-08-14 | Resolved: 2024-09-13

## 2024-10-15 ENCOUNTER — COMMUNITY OUTREACH (OUTPATIENT)
Facility: CLINIC | Age: 69
End: 2024-10-15

## 2024-10-15 NOTE — PROGRESS NOTES
Patient's HM shows they are overdue for Mammogram.  Care Everywhere and  files searched.  HM updated.

## 2024-11-29 ENCOUNTER — TELEPHONE (OUTPATIENT)
Facility: CLINIC | Age: 69
End: 2024-11-29

## 2024-12-06 ENCOUNTER — OFFICE VISIT (OUTPATIENT)
Facility: CLINIC | Age: 69
End: 2024-12-06
Payer: MEDICARE

## 2024-12-06 VITALS
BODY MASS INDEX: 22.99 KG/M2 | HEIGHT: 65 IN | SYSTOLIC BLOOD PRESSURE: 153 MMHG | DIASTOLIC BLOOD PRESSURE: 83 MMHG | WEIGHT: 138 LBS | HEART RATE: 63 BPM | OXYGEN SATURATION: 96 % | TEMPERATURE: 97.3 F | RESPIRATION RATE: 18 BRPM

## 2024-12-06 DIAGNOSIS — I10 PRIMARY HYPERTENSION: ICD-10-CM

## 2024-12-06 DIAGNOSIS — F51.01 PRIMARY INSOMNIA: ICD-10-CM

## 2024-12-06 DIAGNOSIS — I47.10 SUPRAVENTRICULAR TACHYCARDIA (HCC): ICD-10-CM

## 2024-12-06 DIAGNOSIS — R11.0 CHRONIC NAUSEA: ICD-10-CM

## 2024-12-06 DIAGNOSIS — E03.9 HYPOTHYROIDISM, UNSPECIFIED TYPE: Primary | ICD-10-CM

## 2024-12-06 PROCEDURE — 99214 OFFICE O/P EST MOD 30 MIN: CPT | Performed by: FAMILY MEDICINE

## 2024-12-06 PROCEDURE — 1090F PRES/ABSN URINE INCON ASSESS: CPT | Performed by: FAMILY MEDICINE

## 2024-12-06 PROCEDURE — G8428 CUR MEDS NOT DOCUMENT: HCPCS | Performed by: FAMILY MEDICINE

## 2024-12-06 PROCEDURE — 3079F DIAST BP 80-89 MM HG: CPT | Performed by: FAMILY MEDICINE

## 2024-12-06 PROCEDURE — G8484 FLU IMMUNIZE NO ADMIN: HCPCS | Performed by: FAMILY MEDICINE

## 2024-12-06 PROCEDURE — 1036F TOBACCO NON-USER: CPT | Performed by: FAMILY MEDICINE

## 2024-12-06 PROCEDURE — 1123F ACP DISCUSS/DSCN MKR DOCD: CPT | Performed by: FAMILY MEDICINE

## 2024-12-06 PROCEDURE — 1126F AMNT PAIN NOTED NONE PRSNT: CPT | Performed by: FAMILY MEDICINE

## 2024-12-06 PROCEDURE — G8399 PT W/DXA RESULTS DOCUMENT: HCPCS | Performed by: FAMILY MEDICINE

## 2024-12-06 PROCEDURE — G8420 CALC BMI NORM PARAMETERS: HCPCS | Performed by: FAMILY MEDICINE

## 2024-12-06 PROCEDURE — 3017F COLORECTAL CA SCREEN DOC REV: CPT | Performed by: FAMILY MEDICINE

## 2024-12-06 PROCEDURE — 3077F SYST BP >= 140 MM HG: CPT | Performed by: FAMILY MEDICINE

## 2024-12-06 RX ORDER — HYDROCHLOROTHIAZIDE 25 MG/1
25 TABLET ORAL DAILY
Qty: 90 TABLET | Refills: 1 | Status: SHIPPED | OUTPATIENT
Start: 2024-12-06

## 2024-12-06 RX ORDER — ONDANSETRON 4 MG/1
4 TABLET, FILM COATED ORAL EVERY 8 HOURS PRN
Qty: 20 TABLET | Refills: 0 | Status: SHIPPED | OUTPATIENT
Start: 2024-12-06

## 2024-12-06 RX ORDER — LEVOTHYROXINE SODIUM 50 UG/1
50 TABLET ORAL DAILY
Qty: 90 TABLET | Refills: 1 | Status: SHIPPED | OUTPATIENT
Start: 2024-12-06

## 2024-12-06 RX ORDER — TRAZODONE HYDROCHLORIDE 50 MG/1
50 TABLET, FILM COATED ORAL NIGHTLY PRN
Qty: 90 TABLET | Refills: 1 | Status: SHIPPED | OUTPATIENT
Start: 2024-12-06

## 2024-12-06 SDOH — ECONOMIC STABILITY: INCOME INSECURITY: HOW HARD IS IT FOR YOU TO PAY FOR THE VERY BASICS LIKE FOOD, HOUSING, MEDICAL CARE, AND HEATING?: NOT VERY HARD

## 2024-12-06 SDOH — ECONOMIC STABILITY: FOOD INSECURITY: WITHIN THE PAST 12 MONTHS, YOU WORRIED THAT YOUR FOOD WOULD RUN OUT BEFORE YOU GOT MONEY TO BUY MORE.: NEVER TRUE

## 2024-12-06 SDOH — ECONOMIC STABILITY: FOOD INSECURITY: WITHIN THE PAST 12 MONTHS, THE FOOD YOU BOUGHT JUST DIDN'T LAST AND YOU DIDN'T HAVE MONEY TO GET MORE.: NEVER TRUE

## 2024-12-06 ASSESSMENT — PATIENT HEALTH QUESTIONNAIRE - PHQ9
SUM OF ALL RESPONSES TO PHQ QUESTIONS 1-9: 0
SUM OF ALL RESPONSES TO PHQ9 QUESTIONS 1 & 2: 0
SUM OF ALL RESPONSES TO PHQ QUESTIONS 1-9: 0
1. LITTLE INTEREST OR PLEASURE IN DOING THINGS: NOT AT ALL
SUM OF ALL RESPONSES TO PHQ QUESTIONS 1-9: 0
SUM OF ALL RESPONSES TO PHQ QUESTIONS 1-9: 0
2. FEELING DOWN, DEPRESSED OR HOPELESS: NOT AT ALL

## 2024-12-06 NOTE — PROGRESS NOTES
Maia Anderson is a 69 y.o. female who presents to the office today for the following:  No chief complaint on file.      Allergies   Allergen Reactions    Hydrocodone-Acetaminophen Hives and Itching    Penicillins Hives     Other reaction(s): Not Reported This Time    Codeine Nausea And Vomiting    Tramadol Itching and Palpitations         Current Outpatient Medications:     ondansetron (ZOFRAN) 4 MG tablet, Take 1 tablet by mouth every 8 hours as needed for Nausea (Patient not taking: Reported on 12/6/2024), Disp: 60 tablet, Rfl: 0    hydroCHLOROthiazide (HYDRODIURIL) 25 MG tablet, take 1 tablet by mouth once daily (Patient not taking: Reported on 8/14/2024), Disp: , Rfl:     levothyroxine (SYNTHROID) 75 MCG tablet, take 1 tablet by mouth once daily (BEFORE BREAKFAST) (Patient not taking: Reported on 8/14/2024), Disp: , Rfl:     losartan (COZAAR) 25 MG tablet, Take 25 mg by mouth daily (Patient not taking: Reported on 8/14/2024), Disp: , Rfl:     naloxone (NARCAN) 2 MG/2ML injection, Inject 1 mg into the muscle once as needed (Patient not taking: Reported on 8/14/2024), Disp: , Rfl:     traZODone (DESYREL) 50 MG tablet, take 1 tablet by mouth at bedtime if needed (Patient not taking: Reported on 8/14/2024), Disp: , Rfl:       Past Medical History:   Diagnosis Date    Anxiety disorder     Arthritis     Cervical dystonia 9/5/2014    Cervical pain     Cervicogenic headache 9/5/2014    Chronic pain     knee    COVID-19 12/2020    DDD (degenerative disc disease), cervical 9/5/2014    Essential hypertension     Fibrillation, atrial (HCC)     denies    Fibromyalgia     GERD (gastroesophageal reflux disease)     \"malfunctioning esophagus\"    Headache(784.0)     Hepatic steatosis     Herniated disc, cervical     Hypercholesterolemia     no longer    Hypertension     Joint pain     JRA (juvenile rheumatoid arthritis) (HCC)     Menopause     Muscle pain     Musculoskeletal pain 9/5/2014    Non-intractable vomiting with

## 2024-12-19 ENCOUNTER — HOSPITAL ENCOUNTER (OUTPATIENT)
Age: 69
Setting detail: SPECIMEN
Discharge: HOME | End: 2024-12-22

## 2024-12-19 LAB — LABCORP DRAW FEE: NORMAL

## 2024-12-19 PROCEDURE — 99001 SPECIMEN HANDLING PT-LAB: CPT

## 2024-12-20 ENCOUNTER — TELEPHONE (OUTPATIENT)
Facility: CLINIC | Age: 69
End: 2024-12-20

## 2024-12-20 DIAGNOSIS — E03.9 HYPOTHYROIDISM, UNSPECIFIED TYPE: Primary | ICD-10-CM

## 2024-12-20 LAB
ALBUMIN SERPL-MCNC: 4.3 G/DL (ref 3.9–4.9)
ALP SERPL-CCNC: 83 IU/L (ref 44–121)
ALT SERPL-CCNC: 8 IU/L (ref 0–32)
AST SERPL-CCNC: 20 IU/L (ref 0–40)
BASOPHILS # BLD AUTO: 0 X10E3/UL (ref 0–0.2)
BASOPHILS NFR BLD AUTO: 1 %
BILIRUB SERPL-MCNC: 0.5 MG/DL (ref 0–1.2)
BUN SERPL-MCNC: 11 MG/DL (ref 8–27)
BUN/CREAT SERPL: 10 (ref 12–28)
CALCIUM SERPL-MCNC: 9.2 MG/DL (ref 8.7–10.3)
CHLORIDE SERPL-SCNC: 95 MMOL/L (ref 96–106)
CHOLEST SERPL-MCNC: 192 MG/DL (ref 100–199)
CO2 SERPL-SCNC: 20 MMOL/L (ref 20–29)
CREAT SERPL-MCNC: 1.05 MG/DL (ref 0.57–1)
EGFRCR SERPLBLD CKD-EPI 2021: 58 ML/MIN/1.73
EOSINOPHIL # BLD AUTO: 0 X10E3/UL (ref 0–0.4)
EOSINOPHIL NFR BLD AUTO: 1 %
ERYTHROCYTE [DISTWIDTH] IN BLOOD BY AUTOMATED COUNT: 12.7 % (ref 11.7–15.4)
GLOBULIN SER CALC-MCNC: 2.7 G/DL (ref 1.5–4.5)
GLUCOSE SERPL-MCNC: 88 MG/DL (ref 70–99)
HBA1C MFR BLD: 5.4 % (ref 4.8–5.6)
HCT VFR BLD AUTO: 38.1 % (ref 34–46.6)
HDLC SERPL-MCNC: 96 MG/DL
HGB BLD-MCNC: 12.7 G/DL (ref 11.1–15.9)
IMM GRANULOCYTES # BLD AUTO: 0 X10E3/UL (ref 0–0.1)
IMM GRANULOCYTES NFR BLD AUTO: 0 %
LDLC SERPL CALC-MCNC: 82 MG/DL (ref 0–99)
LYMPHOCYTES # BLD AUTO: 1.9 X10E3/UL (ref 0.7–3.1)
LYMPHOCYTES NFR BLD AUTO: 35 %
MCH RBC QN AUTO: 32.8 PG (ref 26.6–33)
MCHC RBC AUTO-ENTMCNC: 33.3 G/DL (ref 31.5–35.7)
MCV RBC AUTO: 98 FL (ref 79–97)
MONOCYTES # BLD AUTO: 0.4 X10E3/UL (ref 0.1–0.9)
MONOCYTES NFR BLD AUTO: 8 %
NEUTROPHILS # BLD AUTO: 3.1 X10E3/UL (ref 1.4–7)
NEUTROPHILS NFR BLD AUTO: 55 %
PLATELET # BLD AUTO: 265 X10E3/UL (ref 150–450)
POTASSIUM SERPL-SCNC: 3.8 MMOL/L (ref 3.5–5.2)
PROT SERPL-MCNC: 7 G/DL (ref 6–8.5)
RBC # BLD AUTO: 3.87 X10E6/UL (ref 3.77–5.28)
SODIUM SERPL-SCNC: 133 MMOL/L (ref 134–144)
SPECIMEN STATUS REPORT: NORMAL
TRIGL SERPL-MCNC: 78 MG/DL (ref 0–149)
VLDLC SERPL CALC-MCNC: 14 MG/DL (ref 5–40)
WBC # BLD AUTO: 5.5 X10E3/UL (ref 3.4–10.8)

## 2024-12-20 NOTE — TELEPHONE ENCOUNTER
----- Message from Lenny CHEW sent at 12/19/2024  3:02 PM EST -----  Regarding: ECC Escalation To Practice  ECC Escalation To Practice      Type of Escalation: Red Flag Symptom  --------------------------------------------------------------------------------------------------------------------------    Information for Provider:  Patient is looking for appointment for: Symptom Chest Pain  Reasons for Message: Unable to reach practice     Additional Information: Patient is calling to make an appointment to see her provider in regards for the pain on her chest that she is experiencing for about 2 weeks now.She also mentioned that she is experiencing back pain, and her whole body is having a pain.   --------------------------------------------------------------------------------------------------------------------------    Relationship to Patient: Self     Call Back Info: OK to leave message on voicemail  Preferred Call Back Number: Phone [phone]

## 2024-12-20 NOTE — TELEPHONE ENCOUNTER
Called patient in regards to her chest pain, patient states it has been two weeks and getting progressively worse. When she stands it's a sharp pain and she gets a headache and looses balance to the point that she may fall. I asked patient is there anyone that can take her to her ER due to her symptoms. Patient said her  was there and was willing to take her. Patient became hysterical and stated she knows something is wrong with her and no one can really tell her what. Says her whole body hurts but her chest hurts the worse. Instructed patient to take deep breaths, do not drink or eat anything and get the ER as soon as possible for evaluation. Patient states she is going now.

## 2024-12-20 NOTE — RESULT ENCOUNTER NOTE
Your blood count level was normal.  Kidney function shows kidney insufficiency.  Your sodium level was low which may be due to your bp medication hydrochlotohiazide  Cholesterol levels were all normal.  Screening for diabetes was negative.  Thyroid was not done, I added a thyroid level to check it.

## 2024-12-21 LAB
HCV IGG SERPL QL IA: NON REACTIVE
HIV 1+2 AB+HIV1 P24 AG SERPL QL IA: NON REACTIVE
TSH SERPL DL<=0.005 MIU/L-ACNC: 2.81 UIU/ML (ref 0.45–4.5)

## 2024-12-27 ENCOUNTER — CARE COORDINATION (OUTPATIENT)
Facility: CLINIC | Age: 69
End: 2024-12-27

## 2024-12-27 NOTE — CARE COORDINATION
Care Transitions Note    Initial Call - Call within 2 business days of discharge: Yes    Attempted to reach patient for transitions of care follow up. Unable to reach patient.    Outreach Attempts:   HIPAA compliant voicemail left for patient.   Received a return VMM from patient stating she is doing fair. Attempted to contact patient again. No answer.    Patient: Maia Anderson    Patient : 1955   MRN: [id number]    Reason for Admission: Chest Pain/Palpitations, Fall likely r/t BPPV/dehydration, Incidental Left Renal Mass Concerning for RCC  Admission Date: 24  Discharge Date:  24  RURS: No data recorded      Was this an external facility discharge? Yes. Discharge Date: 24. Facility Name: [facility]    Follow Up Appointment:   Patient has hospital follow up appointment scheduled within 14 days of discharge.  CTN sent scheduling request for PCP appointment to be moved within 7 days of discharge if possible.  Future Appointments         Provider Specialty Dept Phone    2025 9:00 AM Ángela Downs MD Family Medicine [phone]            Plan for follow-up on next business day.      Olivia Cash RN

## 2024-12-30 ENCOUNTER — CARE COORDINATION (OUTPATIENT)
Facility: CLINIC | Age: 69
End: 2024-12-30

## 2024-12-30 DIAGNOSIS — K21.9 GASTROESOPHAGEAL REFLUX DISEASE WITHOUT ESOPHAGITIS: ICD-10-CM

## 2024-12-30 DIAGNOSIS — E78.00 HYPERCHOLESTEROLEMIA: ICD-10-CM

## 2024-12-30 DIAGNOSIS — K90.829 SHORT BOWEL SYNDROME, UNSPECIFIED WHETHER COLON IN CONTINUITY: ICD-10-CM

## 2024-12-30 DIAGNOSIS — K21.00 GASTROESOPHAGEAL REFLUX DISEASE WITH ESOPHAGITIS, UNSPECIFIED WHETHER HEMORRHAGE: ICD-10-CM

## 2024-12-30 DIAGNOSIS — E07.9 THYROID DISEASE: ICD-10-CM

## 2024-12-30 DIAGNOSIS — I48.91 ATRIAL FIBRILLATION WITH RVR (HCC): Primary | ICD-10-CM

## 2024-12-30 DIAGNOSIS — F41.9 ANXIETY: ICD-10-CM

## 2024-12-30 DIAGNOSIS — R11.0 NAUSEA: ICD-10-CM

## 2024-12-30 DIAGNOSIS — G89.4 CHRONIC PAIN SYNDROME: ICD-10-CM

## 2024-12-30 DIAGNOSIS — I47.10 SUPRAVENTRICULAR TACHYCARDIA (HCC): ICD-10-CM

## 2024-12-30 DIAGNOSIS — Z98.84 S/P GASTRIC BYPASS: ICD-10-CM

## 2024-12-30 DIAGNOSIS — I10 ESSENTIAL HYPERTENSION: ICD-10-CM

## 2024-12-30 PROCEDURE — 1111F DSCHRG MED/CURRENT MED MERGE: CPT | Performed by: FAMILY MEDICINE

## 2024-12-30 RX ORDER — ASPIRIN 81 MG/1
81 TABLET ORAL DAILY
COMMUNITY
End: 2024-12-30

## 2024-12-30 RX ORDER — ASPIRIN 81 MG/1
81 TABLET, CHEWABLE ORAL DAILY
COMMUNITY
Start: 2024-12-27

## 2024-12-30 RX ORDER — OMEPRAZOLE 40 MG/1
40 CAPSULE, DELAYED RELEASE ORAL DAILY
COMMUNITY

## 2024-12-30 RX ORDER — LANOLIN ALCOHOL/MO/W.PET/CERES
1000 CREAM (GRAM) TOPICAL DAILY
COMMUNITY

## 2024-12-30 RX ORDER — ATORVASTATIN CALCIUM 40 MG/1
1 TABLET, FILM COATED ORAL
COMMUNITY
Start: 2024-12-26

## 2024-12-30 NOTE — CARE COORDINATION
Care Transitions Note    Initial Call - Call within 2 business days of discharge: Yes    Patient Current Location:  [place]    Care Transition Nurse contacted the patient by telephone to perform post hospital discharge assessment, verified name and  as identifiers. Provided introduction to self, and explanation of the Care Transition Nurse role.     Patient: Maia Anderson    Patient : 1955   MRN: [id number]    Reason for Admission: Chest Pain/Palpitations, Fall likely r/t BPPV/dehydration, Incidental Left Renal Mass Concerning for RCC  Admission Date: 24  Discharge Date:  24  RURS: No data recorded        Was this an external facility discharge? Yes. Discharge Date: 24. Facility Name: [facility]    Additional needs identified to be addressed with provider   No needs identified             Method of communication with provider: none.    Patients top risk factors for readmission: medical condition-BPPV/orthostatic hypotension     Interventions to address risk factors:   Review of patient management of conditions/medications: Monitoring BP-moderate to high at times, last night 147/70s and will check again this evening. Scheduled next available appt with urology.  Home Health: [facility] called her and said they will call her back to next Monday to schedule a visit that week.     Care Summary Note: Patient reports BP was up and down, which all stemmed from not taking her thyroid medication. She has ups and downs with how she feels. Using her walker as a precaution, walking outside today and working on increasing strength.     Care Transition Nurse reviewed discharge instructions and medical action plan with patient. The patient was given an opportunity to ask questions;  patient will address medication-related questions with PCP . The patient verbalized understanding.  and demonstrated understanding using teach back method.   Were discharge instructions available to

## 2025-01-06 ENCOUNTER — CARE COORDINATION (OUTPATIENT)
Facility: CLINIC | Age: 70
End: 2025-01-06

## 2025-01-06 NOTE — CARE COORDINATION
Care Transitions Note    Follow Up Call     Patient Current Location:  Home: [id number]  [place] [id number]    [facility] Care Coordinator contacted the patient by telephone. Verified name and  as identifiers.    Additional needs identified to be addressed with provider   No needs identified                 Method of communication with provider: none.    Care Summary Note:   Spoke with patient.  Patient states she is doing okay. States she is weak.  Patient states she has little appetite but confirms she is getting in liquids.  Patient confirms appointment with pcp on 24.    Plan of care updates since last contact:  Home Health: SOC today- pending   Referrals: RPM -insurance verification pending        Advance Care Planning:   Does patient have an Advance Directive: reviewed during previous call, see note. .    Medication Review:  No changes since last call.     Remote Patient Monitoring:  Offered patient enrollment in the Remote Patient Monitoring (RPM) program for in-home monitoring: waiting on insurance verification.    Assessments:  No changes since last call    Follow Up Appointment:   Reviewed upcoming appointment(s).  Future Appointments         Provider Specialty Dept Phone    2025 9:00 AM Ángela Downs MD Family Medicine [phone]    2025 1:00 PM Juvencio Esparza MD Urology [phone]            [facility] Care Coordinator provided contact information.  Plan for follow-up call in 6-10 days based on severity of symptoms and risk factors.  Plan for next call: self management-b/p readings  follow-up appointment-did patient attend pcp appointment  referrals-f/u on RPM      Tameka Dominguez LPN

## 2025-01-07 ENCOUNTER — CARE COORDINATION (OUTPATIENT)
Facility: CLINIC | Age: 70
End: 2025-01-07

## 2025-01-07 NOTE — CARE COORDINATION
Care Transitions Note    Follow Up Call     Attempted to reach patient for RPM follow up.  Unable to reach patient.      Outreach Attempts:   HIPAA compliant voicemail left for patient.     Care Summary Note: Received notification from Care Coordination  that she confirmed with patient's insurance that there is a $0 copay, $0 deductible, and $0 co-insurance for RPM CPT codes. Notified patient via [facility] that RPM is covered and encouraged her to call back for enrollment if she is still interested.    Follow Up Appointment:   Future Appointments         Provider Specialty Dept Phone    1/8/2025 9:00 AM Ángela Downs MD Family Medicine [phone]    1/15/2025 3:00 PM Juvencio Esparza MD Urology [phone]            Plan for follow-up call in 6-10 days based on severity of symptoms and risk factors. Plan for next call: symptom management-assess for red flags  self management-review recent BP readings  follow-up appointment-confirm attendance of PCP appt  referrals-notify patient of RPM coverage by insurance and offer enrollment    Olivia Cash RN

## 2025-01-08 ENCOUNTER — OFFICE VISIT (OUTPATIENT)
Facility: CLINIC | Age: 70
End: 2025-01-08
Payer: MEDICARE

## 2025-01-08 VITALS
HEART RATE: 76 BPM | SYSTOLIC BLOOD PRESSURE: 119 MMHG | HEIGHT: 65 IN | BODY MASS INDEX: 22.66 KG/M2 | WEIGHT: 136 LBS | RESPIRATION RATE: 18 BRPM | TEMPERATURE: 97.3 F | OXYGEN SATURATION: 98 % | DIASTOLIC BLOOD PRESSURE: 74 MMHG

## 2025-01-08 DIAGNOSIS — G47.09 OTHER INSOMNIA: ICD-10-CM

## 2025-01-08 DIAGNOSIS — K21.9 GASTROESOPHAGEAL REFLUX DISEASE WITHOUT ESOPHAGITIS: Primary | ICD-10-CM

## 2025-01-08 DIAGNOSIS — R11.0 CHRONIC NAUSEA: ICD-10-CM

## 2025-01-08 PROCEDURE — G8428 CUR MEDS NOT DOCUMENT: HCPCS | Performed by: FAMILY MEDICINE

## 2025-01-08 PROCEDURE — G8420 CALC BMI NORM PARAMETERS: HCPCS | Performed by: FAMILY MEDICINE

## 2025-01-08 PROCEDURE — 1123F ACP DISCUSS/DSCN MKR DOCD: CPT | Performed by: FAMILY MEDICINE

## 2025-01-08 PROCEDURE — 99214 OFFICE O/P EST MOD 30 MIN: CPT | Performed by: FAMILY MEDICINE

## 2025-01-08 PROCEDURE — 3017F COLORECTAL CA SCREEN DOC REV: CPT | Performed by: FAMILY MEDICINE

## 2025-01-08 PROCEDURE — 3078F DIAST BP <80 MM HG: CPT | Performed by: FAMILY MEDICINE

## 2025-01-08 PROCEDURE — G8399 PT W/DXA RESULTS DOCUMENT: HCPCS | Performed by: FAMILY MEDICINE

## 2025-01-08 PROCEDURE — 1036F TOBACCO NON-USER: CPT | Performed by: FAMILY MEDICINE

## 2025-01-08 PROCEDURE — 3074F SYST BP LT 130 MM HG: CPT | Performed by: FAMILY MEDICINE

## 2025-01-08 PROCEDURE — 1090F PRES/ABSN URINE INCON ASSESS: CPT | Performed by: FAMILY MEDICINE

## 2025-01-08 RX ORDER — HYDROXYZINE HYDROCHLORIDE 25 MG/1
25 TABLET, FILM COATED ORAL NIGHTLY PRN
Qty: 30 TABLET | Refills: 0 | Status: SHIPPED | OUTPATIENT
Start: 2025-01-08 | End: 2025-02-05

## 2025-01-08 RX ORDER — ONDANSETRON 4 MG/1
4 TABLET, FILM COATED ORAL EVERY 8 HOURS PRN
Qty: 20 TABLET | Refills: 0 | Status: SHIPPED | OUTPATIENT
Start: 2025-01-08 | End: 2025-02-05 | Stop reason: SDUPTHER

## 2025-01-08 RX ORDER — OMEPRAZOLE 40 MG/1
40 CAPSULE, DELAYED RELEASE ORAL DAILY
Qty: 90 CAPSULE | Refills: 1 | Status: SHIPPED | OUTPATIENT
Start: 2025-01-08

## 2025-01-08 SDOH — ECONOMIC STABILITY: FOOD INSECURITY: WITHIN THE PAST 12 MONTHS, THE FOOD YOU BOUGHT JUST DIDN'T LAST AND YOU DIDN'T HAVE MONEY TO GET MORE.: NEVER TRUE

## 2025-01-08 SDOH — ECONOMIC STABILITY: FOOD INSECURITY: WITHIN THE PAST 12 MONTHS, YOU WORRIED THAT YOUR FOOD WOULD RUN OUT BEFORE YOU GOT MONEY TO BUY MORE.: NEVER TRUE

## 2025-01-08 ASSESSMENT — PATIENT HEALTH QUESTIONNAIRE - PHQ9
SUM OF ALL RESPONSES TO PHQ9 QUESTIONS 1 & 2: 0
SUM OF ALL RESPONSES TO PHQ QUESTIONS 1-9: 0
2. FEELING DOWN, DEPRESSED OR HOPELESS: NOT AT ALL
1. LITTLE INTEREST OR PLEASURE IN DOING THINGS: NOT AT ALL

## 2025-01-08 NOTE — PROGRESS NOTES
Maia Anderson is a 69 y.o.  female and presents with    Chief Complaint   Patient presents with    Follow-Up from Hospital           Subjective:        Maia Anderson presents to the clinic for a hospital follow up. She went to the hospital 12/20/2024 for chest pain dizziness, and falling and hitting her head. She's been having chest pain for a month prior. She has only gotten slightly better since being discharged. She still has chest pain that is aggravated by movement, breathing, exertion, and it occurs at rest too. It improves with rest and shallow breathing. She describes it as a pressure. She scales it a 4-7/10. She has taken tylenol which helps some.She has difficulty swallowing, shortness of breath, coughing, dizziness, nausea, vomiting, lower abdominal pain and weakness. She denies fever and sore throat.       Additional Concerns:      Patient Active Problem List   Diagnosis    Status post cervical spinal arthrodesis    S/P cervical discectomy    Age-related nuclear cataract, bilateral    Gastroesophageal reflux disease with esophagitis    S/P gastric bypass    Anxiety    Other chest pain    Weakness generalized    Generalized abdominal pain    Cervicogenic headache    Atrial fibrillation with RVR (HCC)    Esophageal dysfunction    Postlaminectomy syndrome, cervical region    Encounter for postoperative care    Nausea    Hypercholesterolemia    Thyroid disease    Chronic pain syndrome    Cervical dystonia    Neck pain    Nipple discharge    Status post cervical spinal fusion    Supraventricular tachycardia (HCC)    Musculoskeletal pain    DDD (degenerative disc disease), cervical    Malaise    Post-resection malabsorption    Essential hypertension    Frequent headaches    Cervical disc disease with myelopathy    Other headache syndrome    Non-intractable vomiting with nausea     Current Outpatient Medications   Medication Sig Dispense Refill    vitamin B-12 (CYANOCOBALAMIN) 1000 MCG

## 2025-01-08 NOTE — PROGRESS NOTES
\"Have you been to the ER, urgent care clinic since your last visit?  Hospitalized since your last visit?\"    YES - When: approximately 3  weeks ago.  Where and Why: [facility] 18 days ago for fall with head injury.    “Have you seen or consulted any other health care providers outside our system since your last visit?”    NO

## 2025-01-09 ENCOUNTER — TELEPHONE (OUTPATIENT)
Facility: CLINIC | Age: 70
End: 2025-01-09

## 2025-01-09 NOTE — TELEPHONE ENCOUNTER
Pt has started home health and Tamika at [facility] called. Tamika stated that the patient PT, OT, and requested Telehealth services. Tamika is asking if Dr. Downs can sign off of the others fax it back to [phone] and office number [phone]   None

## 2025-01-13 ENCOUNTER — CARE COORDINATION (OUTPATIENT)
Facility: CLINIC | Age: 70
End: 2025-01-13

## 2025-01-13 NOTE — CARE COORDINATION
Care Transitions Note    Follow Up Call     Attempted to reach patient for transitions of care follow up.  Unable to reach patient.      Outreach Attempts:   HIPAA compliant voicemail left for patient.     Care Summary Note: Patient attended PCP appointment on 1/8/25.    Follow Up Appointment:   Future Appointments         Provider Specialty Dept Phone    1/15/2025 3:00 PM Juvencio Esparza MD Urology [phone]    2/5/2025 10:30 AM Ángela Downs MD Family Medicine [phone]            Plan for follow-up call in 6-10 days based on severity of symptoms and risk factors.   Plan for next call: symptom management-assess for red flags  self management-review recent BP readings  referrals-notify patient of RPM coverage by insurance and offer enrollment    Olivia Cash RN

## 2025-01-24 ENCOUNTER — CARE COORDINATION (OUTPATIENT)
Facility: CLINIC | Age: 70
End: 2025-01-24

## 2025-01-24 NOTE — CARE COORDINATION
Care Transitions Note    Final Call     Patient Current Location:  [place]    Care Transition Nurse contacted the patient by telephone. Verified name and  as identifiers.    Additional needs identified to be addressed with provider   No needs identified                 Method of communication with provider: none.    Care Summary Note: Patient attended PCP appointment on 25 and urology consult for left renal mass on 1/15/25. Referred to IR for biopsy of left renal mass and L3 osseous lesion; [facility] left patient a VMM to schedule.    Patient reports feeling up and down, with some good and some bad days r/t back pain/arthritis and nausea. Having difficulty keeping food down, so eats small snacks throughout the day. She has discussed this with her providers.     Plan of care updates since last contact:  Review of patient management of conditions/medications: Confirmed she is scheduled for left renal mass/L3 osseous lesion biopsy on 25. Confirmed she picked up Prilosec, Zofran, and Atarax as prescribed at PCP appt. Reviewed RPM and patient has already enrolled in [employer].       Advance Care Planning:   Does patient have an Advance Directive: reviewed during previous call, see note. .    Medication Review:  Full medication reconciliation completed during previous call. and Medications changed since last call, reviewed today.     Remote Patient Monitoring:  Offered patient enrollment in the Remote Patient Monitoring (RPM) program for in-home monitoring: Yes, but did not enroll at this time: already enrolled in another RPM program.    Assessments:  Care Transitions Subsequent and Final Call    Subsequent and Final Calls  Do you have any ongoing symptoms?: No  Have your medications changed?: Yes  Patient Reports: Hydroxyzine prescribed.  Do you have any questions related to your medications?: No  Do you currently have any active services?: Yes  Are you currently active with any services?: Home Health,

## 2025-01-27 ENCOUNTER — CARE COORDINATION (OUTPATIENT)
Facility: CLINIC | Age: 70
End: 2025-01-27

## 2025-01-27 NOTE — CARE COORDINATION
Care Transitions Note    Final Note      Patient graduated from the Care Transitions program on 1/27/25.  Patient/family verbalizes confidence in the ability to self-manage at this time..      Handoff:   Patient was not referred to the ACM team due to no additional needs identified.         Upcoming Appointments:    Future Appointments         Provider Specialty Dept Phone    2/5/2025 10:30 AM Ángela Downs MD Family Medicine [phone]    2/19/2025 11:45 AM Juvencio Esparza MD Urology [phone]            Olivia Cash RN

## 2025-02-05 ENCOUNTER — OFFICE VISIT (OUTPATIENT)
Facility: CLINIC | Age: 70
End: 2025-02-05

## 2025-02-05 VITALS
OXYGEN SATURATION: 96 % | SYSTOLIC BLOOD PRESSURE: 129 MMHG | DIASTOLIC BLOOD PRESSURE: 77 MMHG | TEMPERATURE: 97.2 F | BODY MASS INDEX: 24.16 KG/M2 | HEART RATE: 70 BPM | WEIGHT: 145 LBS | RESPIRATION RATE: 18 BRPM | HEIGHT: 65 IN

## 2025-02-05 DIAGNOSIS — Z00.00 INITIAL MEDICARE ANNUAL WELLNESS VISIT: Primary | ICD-10-CM

## 2025-02-05 DIAGNOSIS — M62.838 MUSCLE SPASM: ICD-10-CM

## 2025-02-05 DIAGNOSIS — I48.91 ATRIAL FIBRILLATION WITH RVR (HCC): ICD-10-CM

## 2025-02-05 DIAGNOSIS — R11.0 CHRONIC NAUSEA: ICD-10-CM

## 2025-02-05 RX ORDER — ONDANSETRON 4 MG/1
4 TABLET, FILM COATED ORAL EVERY 8 HOURS PRN
Qty: 20 TABLET | Refills: 0 | Status: SHIPPED | OUTPATIENT
Start: 2025-02-05

## 2025-02-05 ASSESSMENT — PATIENT HEALTH QUESTIONNAIRE - PHQ9
SUM OF ALL RESPONSES TO PHQ9 QUESTIONS 1 & 2: 0
SUM OF ALL RESPONSES TO PHQ QUESTIONS 1-9: 0
2. FEELING DOWN, DEPRESSED OR HOPELESS: NOT AT ALL
SUM OF ALL RESPONSES TO PHQ QUESTIONS 1-9: 0
1. LITTLE INTEREST OR PLEASURE IN DOING THINGS: NOT AT ALL
SUM OF ALL RESPONSES TO PHQ QUESTIONS 1-9: 0
SUM OF ALL RESPONSES TO PHQ QUESTIONS 1-9: 0

## 2025-02-05 ASSESSMENT — LIFESTYLE VARIABLES
HOW OFTEN DO YOU HAVE A DRINK CONTAINING ALCOHOL: NEVER
HOW MANY STANDARD DRINKS CONTAINING ALCOHOL DO YOU HAVE ON A TYPICAL DAY: PATIENT DOES NOT DRINK

## 2025-02-05 NOTE — PROGRESS NOTES
Medicare Annual Wellness Visit    Maia Anderson is here for Medicare AWV    Assessment & Plan   Patient to get shingles (due), pneumonia vaccine and flu (due) vaccine.  Recommended to follow-up with dentist and optometrist this year (will schedule)  Recommended to get mammogram (due 2026), DEXA scan UTD, colonoscopy up-to-date (due 2027)  Discussed advanced directives and given handout for her to return on follow-up.  And designate family member to be a decision maker. Ryan Anderson ()  Discussed healthy diet and exercise.  Screen for pain, depression and risk for falls.    Back pain: continue tizanidone prm.         Subjective   Patient to get shingles (due), pneumonia vaccine and flu (due) vaccine.  Recommended to follow-up with dentist and optometrist this year (will schedule)  Recommended to get mammogram (due 2026), DEXA scan UTD, colonoscopy up-to-date (due 2027)  Discussed advanced directives and given handout for her to return on follow-up.  And designate family member to be a decision maker. Ryan Anderson ()  Discussed healthy diet and exercise.  Screen for pain, depression and risk for falls.    Patient's complete Health Risk Assessment and screening values have been reviewed and are found in Flowsheets. The following problems were reviewed today and where indicated follow up appointments were made and/or referrals ordered.    Positive Risk Factor Screenings with Interventions:             General HRA Questions:  Select all that apply: (!) New or Increased Pain    Interventions - Pain:  Patient advised to follow up in the office for further evaluation and treatment      Inactivity:  On average, how many days per week do you engage in moderate to strenuous exercise (like a brisk walk)?: 0 days (!) Abnormal  On average, how many minutes do you engage in exercise at this level?: 20 min    Interventions:  Patient advised to follow up in the office for further evaluation and treatment

## 2025-03-12 ENCOUNTER — OFFICE VISIT (OUTPATIENT)
Facility: CLINIC | Age: 70
End: 2025-03-12

## 2025-03-12 VITALS
HEIGHT: 65 IN | TEMPERATURE: 97.9 F | HEART RATE: 59 BPM | WEIGHT: 148.2 LBS | OXYGEN SATURATION: 99 % | DIASTOLIC BLOOD PRESSURE: 77 MMHG | SYSTOLIC BLOOD PRESSURE: 154 MMHG | RESPIRATION RATE: 20 BRPM | BODY MASS INDEX: 24.69 KG/M2

## 2025-03-12 DIAGNOSIS — Z98.84 S/P GASTRIC BYPASS: ICD-10-CM

## 2025-03-12 DIAGNOSIS — R11.0 CHRONIC NAUSEA: ICD-10-CM

## 2025-03-12 DIAGNOSIS — R60.9 EDEMA, UNSPECIFIED TYPE: ICD-10-CM

## 2025-03-12 DIAGNOSIS — M25.511 ACUTE PAIN OF RIGHT SHOULDER: Primary | ICD-10-CM

## 2025-03-12 DIAGNOSIS — R06.00 DYSPNEA, UNSPECIFIED TYPE: ICD-10-CM

## 2025-03-12 RX ORDER — FUROSEMIDE 20 MG/1
20 TABLET ORAL 2 TIMES DAILY
Qty: 14 TABLET | Refills: 0 | Status: SHIPPED | OUTPATIENT
Start: 2025-03-12 | End: 2025-03-19

## 2025-03-12 RX ORDER — ONDANSETRON 4 MG/1
4 TABLET, FILM COATED ORAL EVERY 8 HOURS PRN
Qty: 20 TABLET | Refills: 0 | Status: SHIPPED | OUTPATIENT
Start: 2025-03-12

## 2025-03-12 SDOH — ECONOMIC STABILITY: FOOD INSECURITY: WITHIN THE PAST 12 MONTHS, YOU WORRIED THAT YOUR FOOD WOULD RUN OUT BEFORE YOU GOT MONEY TO BUY MORE.: NEVER TRUE

## 2025-03-12 SDOH — ECONOMIC STABILITY: FOOD INSECURITY: WITHIN THE PAST 12 MONTHS, THE FOOD YOU BOUGHT JUST DIDN'T LAST AND YOU DIDN'T HAVE MONEY TO GET MORE.: NEVER TRUE

## 2025-03-12 ASSESSMENT — PATIENT HEALTH QUESTIONNAIRE - PHQ9
2. FEELING DOWN, DEPRESSED OR HOPELESS: NOT AT ALL
SUM OF ALL RESPONSES TO PHQ QUESTIONS 1-9: 0
SUM OF ALL RESPONSES TO PHQ QUESTIONS 1-9: 0
1. LITTLE INTEREST OR PLEASURE IN DOING THINGS: NOT AT ALL
SUM OF ALL RESPONSES TO PHQ QUESTIONS 1-9: 0
SUM OF ALL RESPONSES TO PHQ QUESTIONS 1-9: 0

## 2025-03-12 ASSESSMENT — ANXIETY QUESTIONNAIRES
IF YOU CHECKED OFF ANY PROBLEMS ON THIS QUESTIONNAIRE, HOW DIFFICULT HAVE THESE PROBLEMS MADE IT FOR YOU TO DO YOUR WORK, TAKE CARE OF THINGS AT HOME, OR GET ALONG WITH OTHER PEOPLE: NOT DIFFICULT AT ALL
5. BEING SO RESTLESS THAT IT IS HARD TO SIT STILL: NOT AT ALL
GAD7 TOTAL SCORE: 0
2. NOT BEING ABLE TO STOP OR CONTROL WORRYING: NOT AT ALL
1. FEELING NERVOUS, ANXIOUS, OR ON EDGE: NOT AT ALL
4. TROUBLE RELAXING: NOT AT ALL
7. FEELING AFRAID AS IF SOMETHING AWFUL MIGHT HAPPEN: NOT AT ALL
6. BECOMING EASILY ANNOYED OR IRRITABLE: NOT AT ALL
3. WORRYING TOO MUCH ABOUT DIFFERENT THINGS: NOT AT ALL

## 2025-03-12 NOTE — PROGRESS NOTES
Maia Anderson is a 70 y.o. female who presents to the office today for the following:  Chief Complaint   Patient presents with    X-ray      Right shoulder pain       Allergies   Allergen Reactions    Hydrocodone-Acetaminophen Hives and Itching    Penicillins Hives     Other reaction(s): Not Reported This Time    Codeine Nausea And Vomiting    Tramadol Itching and Palpitations         Current Outpatient Medications:     tiZANidine (ZANAFLEX) 4 MG tablet, Take 1 tablet by mouth 3 times daily as needed (muscle spasm), Disp: 30 tablet, Rfl: 2    ondansetron (ZOFRAN) 4 MG tablet, Take 1 tablet by mouth every 8 hours as needed for Nausea, Disp: 20 tablet, Rfl: 0    omeprazole (PRILOSEC) 40 MG delayed release capsule, Take 1 capsule by mouth daily, Disp: 90 capsule, Rfl: 1    vitamin B-12 (CYANOCOBALAMIN) 1000 MCG tablet, Take 1 tablet by mouth daily, Disp: , Rfl:     atorvastatin (LIPITOR) 40 MG tablet, Take 1 tablet by mouth nightly, Disp: , Rfl:     aspirin 81 MG chewable tablet, Take 1 tablet by mouth daily, Disp: , Rfl:     hydroCHLOROthiazide (HYDRODIURIL) 25 MG tablet, Take 1 tablet by mouth daily, Disp: 90 tablet, Rfl: 1    levothyroxine (SYNTHROID) 50 MCG tablet, Take 1 tablet by mouth daily, Disp: 90 tablet, Rfl: 1    traZODone (DESYREL) 50 MG tablet, Take 1 tablet by mouth nightly as needed for Sleep (Patient not taking: Reported on 3/12/2025), Disp: 90 tablet, Rfl: 1    naloxone (NARCAN) 2 MG/2ML injection, Inject 1 mg into the muscle once as needed (Patient not taking: Reported on 3/12/2025), Disp: , Rfl:       Past Medical History:   Diagnosis Date    Anxiety disorder     Arthritis     Cervical dystonia 9/5/2014    Cervical pain     Cervicogenic headache 9/5/2014    Chronic pain     knee    COVID-19 12/2020    DDD (degenerative disc disease), cervical 9/5/2014    Essential hypertension     Fibrillation, atrial (HCC)     denies    Fibromyalgia     GERD (gastroesophageal reflux disease)

## 2025-03-17 ENCOUNTER — RESULTS FOLLOW-UP (OUTPATIENT)
Facility: CLINIC | Age: 70
End: 2025-03-17

## 2025-03-17 DIAGNOSIS — R60.9 EDEMA, UNSPECIFIED TYPE: ICD-10-CM

## 2025-03-17 RX ORDER — FUROSEMIDE 20 MG/1
20 TABLET ORAL 2 TIMES DAILY
Qty: 14 TABLET | Refills: 0 | OUTPATIENT
Start: 2025-03-17 | End: 2025-03-24

## 2025-04-07 ENCOUNTER — HOSPITAL ENCOUNTER (OUTPATIENT)
Age: 70
Discharge: HOME | End: 2025-04-10

## 2025-04-07 LAB — LABCORP DRAW FEE: NORMAL

## 2025-04-07 PROCEDURE — 99001 SPECIMEN HANDLING PT-LAB: CPT

## 2025-04-08 LAB
BNP SERPL-MCNC: 192.5 PG/ML (ref 0–100)
SPECIMEN STATUS REPORT: NORMAL

## 2025-04-09 ENCOUNTER — OFFICE VISIT (OUTPATIENT)
Facility: CLINIC | Age: 70
End: 2025-04-09
Payer: MEDICARE

## 2025-04-09 VITALS
HEIGHT: 65 IN | WEIGHT: 148 LBS | HEART RATE: 60 BPM | RESPIRATION RATE: 18 BRPM | OXYGEN SATURATION: 97 % | DIASTOLIC BLOOD PRESSURE: 83 MMHG | TEMPERATURE: 97.3 F | BODY MASS INDEX: 24.66 KG/M2 | SYSTOLIC BLOOD PRESSURE: 149 MMHG

## 2025-04-09 DIAGNOSIS — Z13.220 SCREENING, LIPID: ICD-10-CM

## 2025-04-09 DIAGNOSIS — R60.9 EDEMA, UNSPECIFIED TYPE: ICD-10-CM

## 2025-04-09 DIAGNOSIS — K21.9 GASTROESOPHAGEAL REFLUX DISEASE WITHOUT ESOPHAGITIS: ICD-10-CM

## 2025-04-09 DIAGNOSIS — M51.9 LUMBAR DISC LESION: ICD-10-CM

## 2025-04-09 DIAGNOSIS — Z13.1 SCREENING FOR DIABETES MELLITUS (DM): ICD-10-CM

## 2025-04-09 DIAGNOSIS — M54.9 SPINE PAIN: ICD-10-CM

## 2025-04-09 DIAGNOSIS — M62.838 MUSCLE SPASM: ICD-10-CM

## 2025-04-09 DIAGNOSIS — E03.9 HYPOTHYROIDISM, UNSPECIFIED TYPE: ICD-10-CM

## 2025-04-09 DIAGNOSIS — H40.89 OTHER SPECIFIED GLAUCOMA, UNSPECIFIED LATERALITY: ICD-10-CM

## 2025-04-09 DIAGNOSIS — Z13.29 THYROID DISORDER SCREENING: ICD-10-CM

## 2025-04-09 DIAGNOSIS — R11.0 CHRONIC NAUSEA: ICD-10-CM

## 2025-04-09 DIAGNOSIS — R53.83 FATIGUE, UNSPECIFIED TYPE: ICD-10-CM

## 2025-04-09 DIAGNOSIS — I48.91 ATRIAL FIBRILLATION, UNSPECIFIED TYPE (HCC): Primary | ICD-10-CM

## 2025-04-09 DIAGNOSIS — D17.71 ANGIOMYOLIPOMA OF LEFT KIDNEY: ICD-10-CM

## 2025-04-09 LAB
ALBUMIN SERPL-MCNC: 4.4 G/DL (ref 3.9–4.9)
ALP SERPL-CCNC: 108 IU/L (ref 44–121)
ALT SERPL-CCNC: 11 IU/L (ref 0–32)
AST SERPL-CCNC: 27 IU/L (ref 0–40)
BILIRUB SERPL-MCNC: <0.2 MG/DL (ref 0–1.2)
BUN SERPL-MCNC: 14 MG/DL (ref 8–27)
BUN/CREAT SERPL: 17 (ref 12–28)
CALCIUM SERPL-MCNC: 9.2 MG/DL (ref 8.7–10.3)
CHLORIDE SERPL-SCNC: 117 MMOL/L (ref 96–106)
CO2 SERPL-SCNC: ABNORMAL MMOL/L
CREAT SERPL-MCNC: 0.81 MG/DL (ref 0.57–1)
EGFRCR SERPLBLD CKD-EPI 2021: 78 ML/MIN/1.73
GLOBULIN SER CALC-MCNC: 2.7 G/DL (ref 1.5–4.5)
GLUCOSE SERPL-MCNC: 87 MG/DL (ref 70–99)
POTASSIUM SERPL-SCNC: 4.1 MMOL/L (ref 3.5–5.2)
PROT SERPL-MCNC: 7.1 G/DL (ref 6–8.5)
SODIUM SERPL-SCNC: ABNORMAL MMOL/L

## 2025-04-09 PROCEDURE — 3079F DIAST BP 80-89 MM HG: CPT | Performed by: FAMILY MEDICINE

## 2025-04-09 PROCEDURE — 1123F ACP DISCUSS/DSCN MKR DOCD: CPT | Performed by: FAMILY MEDICINE

## 2025-04-09 PROCEDURE — G8399 PT W/DXA RESULTS DOCUMENT: HCPCS | Performed by: FAMILY MEDICINE

## 2025-04-09 PROCEDURE — 3017F COLORECTAL CA SCREEN DOC REV: CPT | Performed by: FAMILY MEDICINE

## 2025-04-09 PROCEDURE — G8428 CUR MEDS NOT DOCUMENT: HCPCS | Performed by: FAMILY MEDICINE

## 2025-04-09 PROCEDURE — 99215 OFFICE O/P EST HI 40 MIN: CPT | Performed by: FAMILY MEDICINE

## 2025-04-09 PROCEDURE — 1090F PRES/ABSN URINE INCON ASSESS: CPT | Performed by: FAMILY MEDICINE

## 2025-04-09 PROCEDURE — 3077F SYST BP >= 140 MM HG: CPT | Performed by: FAMILY MEDICINE

## 2025-04-09 PROCEDURE — G8420 CALC BMI NORM PARAMETERS: HCPCS | Performed by: FAMILY MEDICINE

## 2025-04-09 PROCEDURE — 1036F TOBACCO NON-USER: CPT | Performed by: FAMILY MEDICINE

## 2025-04-09 RX ORDER — LEVOTHYROXINE SODIUM 50 UG/1
50 TABLET ORAL DAILY
Qty: 90 TABLET | Refills: 1 | Status: SHIPPED | OUTPATIENT
Start: 2025-04-09

## 2025-04-09 RX ORDER — ONDANSETRON 4 MG/1
4 TABLET, FILM COATED ORAL EVERY 8 HOURS PRN
Qty: 20 TABLET | Refills: 0 | Status: SHIPPED | OUTPATIENT
Start: 2025-04-09

## 2025-04-09 RX ORDER — FUROSEMIDE 20 MG/1
20 TABLET ORAL 2 TIMES DAILY
Qty: 14 TABLET | Refills: 0 | Status: CANCELLED | OUTPATIENT
Start: 2025-04-09 | End: 2025-04-16

## 2025-04-09 RX ORDER — OXYCODONE AND ACETAMINOPHEN 5; 325 MG/1; MG/1
1 TABLET ORAL EVERY 6 HOURS PRN
Qty: 28 TABLET | Refills: 0 | Status: SHIPPED | OUTPATIENT
Start: 2025-04-09 | End: 2025-04-16

## 2025-04-09 RX ORDER — OMEPRAZOLE 40 MG/1
40 CAPSULE, DELAYED RELEASE ORAL DAILY
Qty: 90 CAPSULE | Refills: 1 | Status: SHIPPED | OUTPATIENT
Start: 2025-04-09

## 2025-04-09 SDOH — ECONOMIC STABILITY: FOOD INSECURITY: WITHIN THE PAST 12 MONTHS, THE FOOD YOU BOUGHT JUST DIDN'T LAST AND YOU DIDN'T HAVE MONEY TO GET MORE.: NEVER TRUE

## 2025-04-09 SDOH — ECONOMIC STABILITY: FOOD INSECURITY: WITHIN THE PAST 12 MONTHS, YOU WORRIED THAT YOUR FOOD WOULD RUN OUT BEFORE YOU GOT MONEY TO BUY MORE.: NEVER TRUE

## 2025-04-09 ASSESSMENT — PATIENT HEALTH QUESTIONNAIRE - PHQ9
SUM OF ALL RESPONSES TO PHQ QUESTIONS 1-9: 0
2. FEELING DOWN, DEPRESSED OR HOPELESS: NOT AT ALL
1. LITTLE INTEREST OR PLEASURE IN DOING THINGS: NOT AT ALL
SUM OF ALL RESPONSES TO PHQ QUESTIONS 1-9: 0

## 2025-04-09 NOTE — PROGRESS NOTES
\"Have you been to the ER, urgent care clinic since your last visit?  Hospitalized since your last visit?\"    NO    “Have you seen or consulted any other health care providers outside our system since your last visit?”    NO            
Expenses: Not very hard   Food Insecurity: No Food Insecurity (2025)    Hunger Vital Sign     Worried About Running Out of Food in the Last Year: Never true     Ran Out of Food in the Last Year: Never true   Transportation Needs: No Transportation Needs (2025)    PRAPARE - Transportation     Lack of Transportation (Medical): No     Lack of Transportation (Non-Medical): No   Physical Activity: Inactive (2025)    Exercise Vital Sign     Days of Exercise per Week: 0 days     Minutes of Exercise per Session: 20 min   Stress: Not on file   Social Connections: Feeling Socially Integrated (3/4/2025)    Received from [facility]    OASIS : Social Isolation     Frequency of experiencing loneliness or isolation: Rarely   Intimate Partner Violence: Not on file   Housing Stability: Low Risk  (2025)    Housing Stability Vital Sign     Unable to Pay for Housing in the Last Year: No     Number of Times Moved in the Last Year: 0     Homeless in the Last Year: No     or  Social History     Tobacco Use   Smoking Status Former    Current packs/day: 0.00    Types: Cigarettes    Quit date: 1989    Years since quittin.2   Smokeless Tobacco Never       Family History   Problem Relation Age of Onset    Osteoarthritis Father     Hypertension Mother     Heart Failure Mother     Hypertension Father          HPI:  HPI  Has sharp pain in lower back to left kidney. When bending over has severe pain.  Hurts to breathe.   Tizanidine helps somewhat.   Has GERD and hx of bariatric surgery.  Still has diarrhea. Has EGD and colonscopy in .   Has angiomyolipoma in left kidney.  .    ROS:  Review of Systems   Constitutional:  Positive for fatigue.   Musculoskeletal:  Positive for arthralgias, back pain and myalgias.       Physical Exam:  There were no vitals taken for this visit.  Physical Exam  Vitals and nursing note reviewed.   Constitutional:       Appearance: Normal appearance.   Cardiovascular:      Rate and

## 2025-04-10 ASSESSMENT — ENCOUNTER SYMPTOMS: BACK PAIN: 1

## 2025-05-03 DIAGNOSIS — M62.838 MUSCLE SPASM: ICD-10-CM

## 2025-06-13 ENCOUNTER — TELEPHONE (OUTPATIENT)
Facility: CLINIC | Age: 70
End: 2025-06-13

## 2025-06-13 NOTE — TELEPHONE ENCOUNTER
Pt is requesting a prescription for muscle relaxer. She stated she is in pain      Please f/u when available

## 2025-06-24 ENCOUNTER — OFFICE VISIT (OUTPATIENT)
Facility: CLINIC | Age: 70
End: 2025-06-24

## 2025-06-24 VITALS
SYSTOLIC BLOOD PRESSURE: 118 MMHG | RESPIRATION RATE: 18 BRPM | OXYGEN SATURATION: 98 % | HEIGHT: 65 IN | HEART RATE: 61 BPM | BODY MASS INDEX: 21.66 KG/M2 | TEMPERATURE: 97.7 F | DIASTOLIC BLOOD PRESSURE: 82 MMHG | WEIGHT: 130 LBS

## 2025-06-24 DIAGNOSIS — R41.82 ALTERED MENTAL STATUS, UNSPECIFIED ALTERED MENTAL STATUS TYPE: Primary | ICD-10-CM

## 2025-06-24 PROCEDURE — 3079F DIAST BP 80-89 MM HG: CPT | Performed by: FAMILY MEDICINE

## 2025-06-24 PROCEDURE — 3074F SYST BP LT 130 MM HG: CPT | Performed by: FAMILY MEDICINE

## 2025-06-24 PROCEDURE — 99215 OFFICE O/P EST HI 40 MIN: CPT | Performed by: FAMILY MEDICINE

## 2025-06-24 PROCEDURE — 1123F ACP DISCUSS/DSCN MKR DOCD: CPT | Performed by: FAMILY MEDICINE

## 2025-06-24 SDOH — ECONOMIC STABILITY: FOOD INSECURITY: WITHIN THE PAST 12 MONTHS, THE FOOD YOU BOUGHT JUST DIDN'T LAST AND YOU DIDN'T HAVE MONEY TO GET MORE.: NEVER TRUE

## 2025-06-24 SDOH — ECONOMIC STABILITY: FOOD INSECURITY: WITHIN THE PAST 12 MONTHS, YOU WORRIED THAT YOUR FOOD WOULD RUN OUT BEFORE YOU GOT MONEY TO BUY MORE.: NEVER TRUE

## 2025-06-24 ASSESSMENT — PATIENT HEALTH QUESTIONNAIRE - PHQ9
SUM OF ALL RESPONSES TO PHQ QUESTIONS 1-9: 0
1. LITTLE INTEREST OR PLEASURE IN DOING THINGS: NOT AT ALL
2. FEELING DOWN, DEPRESSED OR HOPELESS: NOT AT ALL
SUM OF ALL RESPONSES TO PHQ QUESTIONS 1-9: 0

## 2025-06-24 NOTE — PATIENT INSTRUCTIONS
RECOMMEND GO TO ER TODAY FOR EVALUATION OF CHANGE IN MENTAL STATUS AND IMBALANCE.    4 weeks ago was in the yard and fell forward. Hit her head near the rocks. Got up again and fell again. The second fall happened 1 week ago. Woke up complete loss of her balance. Had no problems with walking. She has not been able to concentrate and very forgetful and slow with her thoughts. She keeps repeating this over and over. Every since fall has been taking tylenol 600 mg (3 tabs) for arthritis pain every 3-4 hours. Having diarrhea in the last 5 days. She has not been able to  her foot.

## 2025-06-24 NOTE — PROGRESS NOTES
Have you been to the ER, urgent care clinic since your last visit?  Hospitalized since your last visit?   NO    Have you seen or consulted any other health care providers outside our system since your last visit?   NO            
hours.  Having diarrhea in the last 5 days. She has not been able to  her foot.  ROS:  Review of Systems   Constitutional:  Positive for fatigue.         Physical Exam:  There were no vitals taken for this visit.  Physical Exam  Vitals reviewed.   Constitutional:       Appearance: Normal appearance.   Cardiovascular:      Rate and Rhythm: Normal rate and regular rhythm.   Pulmonary:      Effort: Pulmonary effort is normal.      Breath sounds: Normal breath sounds.   Neurological:      Mental Status: She is alert.        Assessment/Plan:     Change in mental status, Change in balance  Recommend go to ER for evaluation . Needs CT imaging.      Ángela Downs MD

## 2025-08-11 ENCOUNTER — OFFICE VISIT (OUTPATIENT)
Facility: CLINIC | Age: 70
End: 2025-08-11
Payer: MEDICARE

## 2025-08-11 VITALS
SYSTOLIC BLOOD PRESSURE: 96 MMHG | BODY MASS INDEX: 18.33 KG/M2 | HEART RATE: 66 BPM | TEMPERATURE: 97.3 F | DIASTOLIC BLOOD PRESSURE: 68 MMHG | OXYGEN SATURATION: 95 % | HEIGHT: 65 IN | WEIGHT: 110 LBS

## 2025-08-11 DIAGNOSIS — R63.0 ANOREXIA: ICD-10-CM

## 2025-08-11 DIAGNOSIS — R11.0 CHRONIC NAUSEA: ICD-10-CM

## 2025-08-11 DIAGNOSIS — R51.9 ACUTE NONINTRACTABLE HEADACHE, UNSPECIFIED HEADACHE TYPE: Primary | ICD-10-CM

## 2025-08-11 PROCEDURE — 1036F TOBACCO NON-USER: CPT | Performed by: FAMILY MEDICINE

## 2025-08-11 PROCEDURE — G8419 CALC BMI OUT NRM PARAM NOF/U: HCPCS | Performed by: FAMILY MEDICINE

## 2025-08-11 PROCEDURE — 3074F SYST BP LT 130 MM HG: CPT | Performed by: FAMILY MEDICINE

## 2025-08-11 PROCEDURE — 1123F ACP DISCUSS/DSCN MKR DOCD: CPT | Performed by: FAMILY MEDICINE

## 2025-08-11 PROCEDURE — G8427 DOCREV CUR MEDS BY ELIG CLIN: HCPCS | Performed by: FAMILY MEDICINE

## 2025-08-11 PROCEDURE — 1159F MED LIST DOCD IN RCRD: CPT | Performed by: FAMILY MEDICINE

## 2025-08-11 PROCEDURE — 99214 OFFICE O/P EST MOD 30 MIN: CPT | Performed by: FAMILY MEDICINE

## 2025-08-11 PROCEDURE — 3078F DIAST BP <80 MM HG: CPT | Performed by: FAMILY MEDICINE

## 2025-08-11 PROCEDURE — G8399 PT W/DXA RESULTS DOCUMENT: HCPCS | Performed by: FAMILY MEDICINE

## 2025-08-11 PROCEDURE — 1090F PRES/ABSN URINE INCON ASSESS: CPT | Performed by: FAMILY MEDICINE

## 2025-08-11 PROCEDURE — 3017F COLORECTAL CA SCREEN DOC REV: CPT | Performed by: FAMILY MEDICINE

## 2025-08-11 RX ORDER — ONDANSETRON 4 MG/1
4 TABLET, FILM COATED ORAL EVERY 8 HOURS PRN
Qty: 20 TABLET | Refills: 0 | Status: SHIPPED | OUTPATIENT
Start: 2025-08-11

## 2025-08-11 RX ORDER — OXYCODONE AND ACETAMINOPHEN 7.5; 325 MG/1; MG/1
1 TABLET ORAL EVERY 8 HOURS PRN
Qty: 21 TABLET | Refills: 0 | Status: SHIPPED | OUTPATIENT
Start: 2025-08-11 | End: 2025-08-18

## 2025-08-11 RX ORDER — MIRTAZAPINE 15 MG/1
15 TABLET, FILM COATED ORAL NIGHTLY
Qty: 30 TABLET | Refills: 0 | Status: SHIPPED | OUTPATIENT
Start: 2025-08-11 | End: 2025-08-26

## 2025-08-11 RX ORDER — LEVETIRACETAM 1000 MG/1
TABLET ORAL
COMMUNITY
End: 2025-08-26 | Stop reason: SDUPTHER

## 2025-08-11 RX ORDER — LACOSAMIDE 100 MG/1
TABLET ORAL
COMMUNITY
End: 2025-08-26 | Stop reason: SDUPTHER

## 2025-08-11 SDOH — ECONOMIC STABILITY: FOOD INSECURITY: WITHIN THE PAST 12 MONTHS, THE FOOD YOU BOUGHT JUST DIDN'T LAST AND YOU DIDN'T HAVE MONEY TO GET MORE.: NEVER TRUE

## 2025-08-11 SDOH — ECONOMIC STABILITY: FOOD INSECURITY: WITHIN THE PAST 12 MONTHS, YOU WORRIED THAT YOUR FOOD WOULD RUN OUT BEFORE YOU GOT MONEY TO BUY MORE.: NEVER TRUE

## 2025-08-11 ASSESSMENT — PATIENT HEALTH QUESTIONNAIRE - PHQ9
1. LITTLE INTEREST OR PLEASURE IN DOING THINGS: NOT AT ALL
SUM OF ALL RESPONSES TO PHQ QUESTIONS 1-9: 0
SUM OF ALL RESPONSES TO PHQ QUESTIONS 1-9: 0
2. FEELING DOWN, DEPRESSED OR HOPELESS: NOT AT ALL
SUM OF ALL RESPONSES TO PHQ QUESTIONS 1-9: 0
SUM OF ALL RESPONSES TO PHQ QUESTIONS 1-9: 0

## 2025-08-26 ENCOUNTER — TELEMEDICINE (OUTPATIENT)
Facility: CLINIC | Age: 70
End: 2025-08-26
Payer: MEDICARE

## 2025-08-26 DIAGNOSIS — R56.9 SEIZURE (HCC): ICD-10-CM

## 2025-08-26 DIAGNOSIS — R63.0 ANOREXIA: Primary | ICD-10-CM

## 2025-08-26 PROCEDURE — 1036F TOBACCO NON-USER: CPT | Performed by: FAMILY MEDICINE

## 2025-08-26 PROCEDURE — G8399 PT W/DXA RESULTS DOCUMENT: HCPCS | Performed by: FAMILY MEDICINE

## 2025-08-26 PROCEDURE — 1123F ACP DISCUSS/DSCN MKR DOCD: CPT | Performed by: FAMILY MEDICINE

## 2025-08-26 PROCEDURE — G8428 CUR MEDS NOT DOCUMENT: HCPCS | Performed by: FAMILY MEDICINE

## 2025-08-26 PROCEDURE — G8419 CALC BMI OUT NRM PARAM NOF/U: HCPCS | Performed by: FAMILY MEDICINE

## 2025-08-26 PROCEDURE — 99214 OFFICE O/P EST MOD 30 MIN: CPT | Performed by: FAMILY MEDICINE

## 2025-08-26 PROCEDURE — 3017F COLORECTAL CA SCREEN DOC REV: CPT | Performed by: FAMILY MEDICINE

## 2025-08-26 PROCEDURE — 1090F PRES/ABSN URINE INCON ASSESS: CPT | Performed by: FAMILY MEDICINE

## 2025-08-26 RX ORDER — LEVETIRACETAM 1000 MG/1
1000 TABLET ORAL 2 TIMES DAILY
Qty: 60 TABLET | Refills: 0 | Status: SHIPPED | OUTPATIENT
Start: 2025-08-26

## 2025-08-26 RX ORDER — MEGESTROL ACETATE 40 MG/ML
200 SUSPENSION ORAL DAILY
Qty: 240 ML | Refills: 0 | Status: SHIPPED | OUTPATIENT
Start: 2025-08-26

## 2025-08-26 RX ORDER — MEGESTROL ACETATE 40 MG/1
40 TABLET ORAL DAILY
Qty: 30 TABLET | Refills: 0 | Status: SHIPPED | OUTPATIENT
Start: 2025-08-26 | End: 2025-08-26

## 2025-08-26 RX ORDER — LACOSAMIDE 100 MG/1
100 TABLET ORAL 2 TIMES DAILY
Qty: 30 TABLET | Refills: 0 | Status: SHIPPED | OUTPATIENT
Start: 2025-08-26 | End: 2025-09-25

## (undated) DEVICE — INTENDED FOR TISSUE SEPARATION, AND OTHER PROCEDURES THAT REQUIRE A SHARP SURGICAL BLADE TO PUNCTURE OR CUT.: Brand: BARD-PARKER ® CARBON RIB-BACK BLADES

## (undated) DEVICE — SUTURE ABSORBABLE BRAIDED 2-0 CT-1 27 IN UD VICRYL J259H

## (undated) DEVICE — GOWN,SIRUS,POLYRNF,SETINSLV,L,20/CS: Brand: MEDLINE

## (undated) DEVICE — DERMABOND SKIN ADH 0.7ML -- DERMABOND ADVANCED 12/BX

## (undated) DEVICE — SUT SLK 2-0SH 30IN BLK --

## (undated) DEVICE — PREP SKN CHLRAPRP APL 26ML STR --

## (undated) DEVICE — DRAPE TWL SURG 16X26IN BLU ORB04] ALLCARE INC]

## (undated) DEVICE — GARMENT,MEDLINE,DVT,INT,CALF,FOAM,MED: Brand: MEDLINE

## (undated) DEVICE — SHEET, DRAPE, SPLIT, STERILE: Brand: MEDLINE

## (undated) DEVICE — SUTURE MCRYL SZ 4-0 L18IN ABSRB UD L19MM PS-2 3/8 CIR PRIM Y496G

## (undated) DEVICE — REM POLYHESIVE ADULT PATIENT RETURN ELECTRODE: Brand: VALLEYLAB

## (undated) DEVICE — PROBE SET W/ DRP

## (undated) DEVICE — GAUZE,SPONGE,4"X4",16PLY,STRL,LF,10/TRAY: Brand: MEDLINE

## (undated) DEVICE — PACK PROCEDURE SURG MAJ W/ BASIN LF

## (undated) DEVICE — KIT CLN UP BON SECOURS MARYV

## (undated) DEVICE — SUTURE VCRL SZ 3-0 L27IN ABSRB UD L26MM SH 1/2 CIR J416H

## (undated) DEVICE — BLANKET WRM W40.2XL55.9IN IORT LO BODY + MISTRAL AIR